# Patient Record
Sex: FEMALE | Race: WHITE | Employment: FULL TIME | URBAN - NONMETROPOLITAN AREA
[De-identification: names, ages, dates, MRNs, and addresses within clinical notes are randomized per-mention and may not be internally consistent; named-entity substitution may affect disease eponyms.]

---
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
## 2021-01-08 ENCOUNTER — OFFICE VISIT (OUTPATIENT)
Dept: SURGERY | Age: 21
End: 2021-01-08

## 2021-01-08 VITALS
HEART RATE: 67 BPM | TEMPERATURE: 97.2 F | DIASTOLIC BLOOD PRESSURE: 79 MMHG | SYSTOLIC BLOOD PRESSURE: 122 MMHG | RESPIRATION RATE: 14 BRPM | HEIGHT: 64 IN | BODY MASS INDEX: 39.27 KG/M2 | WEIGHT: 230 LBS | OXYGEN SATURATION: 98 %

## 2021-01-08 DIAGNOSIS — T81.31XD SURGICAL WOUND DEHISCENCE, SUBSEQUENT ENCOUNTER: ICD-10-CM

## 2021-01-08 DIAGNOSIS — Z98.890 S/P SURGICAL REMOVAL OF PILONIDAL CYST: Primary | ICD-10-CM

## 2021-01-08 PROCEDURE — 99024 POSTOP FOLLOW-UP VISIT: CPT | Performed by: NURSE PRACTITIONER

## 2021-01-08 ASSESSMENT — ENCOUNTER SYMPTOMS
CHEST TIGHTNESS: 0
ABDOMINAL PAIN: 0
DIARRHEA: 0
SINUS PRESSURE: 0
NAUSEA: 0
VOICE CHANGE: 0
VOMITING: 0
ABDOMINAL DISTENTION: 0
SORE THROAT: 0
FACIAL SWELLING: 0
STRIDOR: 0
APNEA: 0
PHOTOPHOBIA: 0
COLOR CHANGE: 0
RECTAL PAIN: 0
ANAL BLEEDING: 0
BLOOD IN STOOL: 0
CHOKING: 0
EYE DISCHARGE: 0
EYE REDNESS: 0
CONSTIPATION: 0
RHINORRHEA: 0
BACK PAIN: 0
SHORTNESS OF BREATH: 0
TROUBLE SWALLOWING: 0
COUGH: 0
EYE ITCHING: 0
WHEEZING: 0
EYE PAIN: 0

## 2021-01-08 NOTE — PROGRESS NOTES
[address] [place] [address] Dr Rangel E [facility] [address]  Dept: [phone]  Dept Fax: [phone]  Loc: [phone]    Visit Date: 1/8/2021    Luz Zaidi is a 24 y.o. female who presents today for:  Chief Complaint   Patient presents with    Post-Op Check     s/p- PILONIDAL CYSTECTOMY-11/24/2020 Last seen in the office on 12/18/2020       HPI:     RYAN Rasmussen is a 19-year old female patient who presents today for follow-up status post pilonidal cystectomy on 11/24/20 with Dr. Yadi Keller. Patient did have some wound dehiscence postoperatively once all sutures were removed. Area is still open area but slowly making improvements. Wound is beefy red and viable. No purulent drainage. Minimal bleeding. There are no signs of infection. Area with minimal pain or discomfort. Tolerating regular diet without any nausea or vomiting.  Denies any issues with constipation or diarrhea. No fevers or chills.  Urinating without difficulties.  Denies any shortness of breath or chest pain. Patient just came from [facility]. States she is 60+ days late and they are working her up for PCOS. She also states she has had pitting edema for the last couple of month. Today are exam she does not have any significant edema, possibly trace to lower extremities. She states it is worse in the evenings. She does sit for 8 hours at school Monday-Friday and does not get up and move much. She does not care for her PCP and would like to know if we have any recommendations within [facility].      Past Medical History:   Diagnosis Date    Anxiety     Multiple allergies     Pilonidal cyst       Past Surgical History:   Procedure Laterality Date    CYST INCISION AND DRAINAGE  10/10/2020    pilonidal cyst-- [facility]    MYRINGOTOMY AND TYMPANOSTOMY TUBE PLACEMENT      20mos old- bilateral    PILONIDAL CYST EXCISION N/A 11/24/2020 Right Ear: Hearing and external ear normal.      Left Ear: Hearing and external ear normal.      Nose: Nose normal.      Mouth/Throat:      Mouth: Mucous membranes are not pale, not dry and not cyanotic. Eyes:      General: Lids are normal.   Neck:      Musculoskeletal: Normal range of motion and neck supple. Trachea: Trachea and phonation normal.   Cardiovascular:      Rate and Rhythm: Normal rate and regular rhythm. Pulses: Normal pulses. Heart sounds: S1 normal and S2 normal.   Pulmonary:      Effort: Pulmonary effort is normal. No tachypnea, bradypnea, accessory muscle usage or respiratory distress. Breath sounds: Normal breath sounds. No decreased breath sounds, wheezing or rales. Chest:      Chest wall: No tenderness. Abdominal:      General: Bowel sounds are normal. There is no distension. Palpations: Abdomen is soft. There is no mass. Tenderness: There is no abdominal tenderness. Musculoskeletal: Normal range of motion. General: No tenderness. Right lower leg: Edema (trace) present. Left lower leg: Edema (trace) present. Skin:     General: Skin is warm and dry. Findings: No abrasion, bruising, burn, ecchymosis, erythema, laceration, lesion or rash. Neurological:      Mental Status: She is alert and oriented to person, place, and time. Motor: No tremor, atrophy or abnormal muscle tone. Coordination: Coordination normal.      Gait: Gait normal.      Deep Tendon Reflexes: Reflexes are normal and symmetric. Psychiatric:         Speech: Speech normal.         Behavior: Behavior normal.         Thought Content:  Thought content normal.        Patient Active Problem List   Diagnosis    Infective otitis externa    Cervical adenopathy    Nasal obstruction    Hypertrophy of nasal turbinates    Deviated nasal septum    Enlarged tonsils and adenoids    Tonsillitis, chronic    Status post tonsillectomy and adenoidectomy  Chronic rhinitis    Streptococcal pharyngitis    Chronic pharyngitis    Acute pharyngitis    Otalgia of right ear    Tension headache    Migraine    GERD (gastroesophageal reflux disease)    Pharyngitis    Sore throat, chronic    Vomiting    Pilonidal cyst     Assessment:     1. Status post pilonidal cystectomy   2. Morbid obesity  3. Irregular periods  4. Lower extremity swelling     Plan:     1. Wound without any signs of infection. Continue wound care as directed. Silver nitrate every other day for the next 2 weeks. 2. Tylenol as needed for discomfort   3. Activity as tolerated  4. Follow up with GYN. Being worked up for PCOS. 5. Follow up in 2 weeks. Signs and symptoms reviewed with patient that would be concerning and need her to return to office for re-evaluation. Patient states she will call if she has questions or concerns. 6. Referral to Dr. Esperanza Zeng for establishing care and concerns for LE edema.  Appointment next Friday.      Electronically signed by LASHAWN Webb CNP on 1/8/2021 at 1:10 PM

## 2021-01-15 ENCOUNTER — OFFICE VISIT (OUTPATIENT)
Dept: FAMILY MEDICINE CLINIC | Age: 21
End: 2021-01-15
Payer: MEDICARE

## 2021-01-15 VITALS
TEMPERATURE: 97.6 F | WEIGHT: 237.2 LBS | HEART RATE: 98 BPM | DIASTOLIC BLOOD PRESSURE: 82 MMHG | RESPIRATION RATE: 17 BRPM | OXYGEN SATURATION: 99 % | HEIGHT: 64 IN | SYSTOLIC BLOOD PRESSURE: 134 MMHG | BODY MASS INDEX: 40.49 KG/M2

## 2021-01-15 DIAGNOSIS — R60.9 DEPENDENT EDEMA: ICD-10-CM

## 2021-01-15 DIAGNOSIS — E28.2 PCOS (POLYCYSTIC OVARIAN SYNDROME): Primary | ICD-10-CM

## 2021-01-15 DIAGNOSIS — E88.81 INSULIN RESISTANCE: ICD-10-CM

## 2021-01-15 DIAGNOSIS — Z76.89 ENCOUNTER TO ESTABLISH CARE WITH NEW DOCTOR: ICD-10-CM

## 2021-01-15 PROCEDURE — G8417 CALC BMI ABV UP PARAM F/U: HCPCS | Performed by: STUDENT IN AN ORGANIZED HEALTH CARE EDUCATION/TRAINING PROGRAM

## 2021-01-15 PROCEDURE — G8427 DOCREV CUR MEDS BY ELIG CLIN: HCPCS | Performed by: STUDENT IN AN ORGANIZED HEALTH CARE EDUCATION/TRAINING PROGRAM

## 2021-01-15 PROCEDURE — 1036F TOBACCO NON-USER: CPT | Performed by: STUDENT IN AN ORGANIZED HEALTH CARE EDUCATION/TRAINING PROGRAM

## 2021-01-15 PROCEDURE — 99213 OFFICE O/P EST LOW 20 MIN: CPT | Performed by: STUDENT IN AN ORGANIZED HEALTH CARE EDUCATION/TRAINING PROGRAM

## 2021-01-15 PROCEDURE — G8482 FLU IMMUNIZE ORDER/ADMIN: HCPCS | Performed by: STUDENT IN AN ORGANIZED HEALTH CARE EDUCATION/TRAINING PROGRAM

## 2021-01-15 RX ORDER — MEDROXYPROGESTERONE ACETATE 10 MG/1
10 TABLET ORAL DAILY
COMMUNITY
End: 2021-01-22 | Stop reason: ALTCHOICE

## 2021-01-15 ASSESSMENT — ENCOUNTER SYMPTOMS
VOMITING: 0
RECTAL PAIN: 1
COLOR CHANGE: 0
COUGH: 0
BACK PAIN: 0
SHORTNESS OF BREATH: 0
NAUSEA: 0

## 2021-01-15 ASSESSMENT — PATIENT HEALTH QUESTIONNAIRE - PHQ9
2. FEELING DOWN, DEPRESSED OR HOPELESS: 0
SUM OF ALL RESPONSES TO PHQ QUESTIONS 1-9: 0
SUM OF ALL RESPONSES TO PHQ9 QUESTIONS 1 & 2: 0
SUM OF ALL RESPONSES TO PHQ QUESTIONS 1-9: 0
1. LITTLE INTEREST OR PLEASURE IN DOING THINGS: 0
SUM OF ALL RESPONSES TO PHQ QUESTIONS 1-9: 0

## 2021-01-15 NOTE — PROGRESS NOTES
S: 24 y.o. 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 O: VS:   Vitals:    01/15/21 0904   BP: 134/82   Site: Right Upper Arm   Position: Sitting   Cuff Size: Large Adult   Pulse: 98   Resp: 17   Temp: 97.6 °F (36.4 °C)   TempSrc: Skin   SpO2: 99%   Weight: 237 lb 3.2 oz (107.6 kg)   Height: 5' 4\" (1.626 m)     Body mass index is 40.72 kg/m². Lab Results   Component Value Date    WBC 10.0 01/13/2020    HGB 13.6 01/13/2020    HCT 41.7 01/13/2020     01/13/2020    AST 22 01/13/2020     01/13/2020    K 4.0 01/13/2020     01/13/2020    CREATININE 0.6 01/13/2020    BUN 8 01/13/2020    CO2 24 01/13/2020    TSH 1.780 01/13/2020    CALCIUM 9.3 01/13/2020       No results found. Diagnosis Orders   1. Encounter to establish care with new doctor     2. PCOS (polycystic ovarian syndrome)     3. Dependent edema     4. Insulin resistance         Plan  Comp socks    Keep legs elevated    Cont metformin    Trying to get pregnant this year - on prenatals    Will see you in        Return in about 3 months (around 4/15/2021) for PCOS.     Orders Placed:

## 2021-01-15 NOTE — PROGRESS NOTES
Health Maintenance Due   Topic Date Due    Hepatitis C screen  2001    HIV screen  04/25/2016    Chlamydia screen  04/25/2017

## 2021-01-15 NOTE — PROGRESS NOTES
[address] [facility] [address] [facility] [place] [address]  Dept: [phone]  Dept Fax: [phone]  Loc: [phone]    Cara Torres is a 24 y.o. female who presents today for:  Chief Complaint   Patient presents with    New Patient    Other     pitting edema bilateral legs after surgery pilonidal cyst removed (11/22/2020)       HPI:   New patient, here to establish care. She was referred to us by General Surgery. She has been following with them for pilonidal cyst surgery. She is an LPN school full-time. Following with Ob/Gyn - possible PCOS, started on Metformin. She has been on Provera x week. Menstrual cycle changed, has not had a period in last 60 days. Denies any pain with intercourse. Would like to have a baby this year. On prenatal vitamins. Weight loss has been difficult for her, especially with her school schedule. Reports she has some edema in her legs - approx a week after surgery. She does sit for 8 hours a day at school never noticed piror to to surgery. She reports some tightness. No erythema. No SOB, cough. No other issues or concerns today. He did have blood work done for OB/GYN last week.   Past Medical History:   Diagnosis Date    Anxiety     Multiple allergies     PCOS (polycystic ovarian syndrome)     Pilonidal cyst       Past Surgical History:   Procedure Laterality Date    CYST INCISION AND DRAINAGE  10/10/2020    pilonidal cyst-- [facility]    MYRINGOTOMY AND TYMPANOSTOMY TUBE PLACEMENT      20mos old- bilateral    PILONIDAL CYST EXCISION N/A 11/24/2020    PILONIDAL CYSTECTOMY performed by Brock Ordonez MD at [address] [place] [address] SEPTOPLASTY  10/23/2017    SEPTOPLASTY N/A 10/23/2017    SEPTOPLASTY, POSSIBLE SUBMUCOUS RESECT TURBINATES PARTIAL LEFT performed by Grace Campbell MD at [facility]  11/20/12    Dr eSrena Royal     Family History Problem Relation Age of Onset    Asthma Mother     Diabetes Maternal Grandfather     Heart Disease Maternal Grandfather     High Blood Pressure Maternal Grandfather     Kidney Disease Maternal Grandfather     Cancer Maternal Grandfather     Arthritis Maternal Grandfather     Stroke Maternal Grandfather     Heart Disease Paternal Grandmother     High Blood Pressure Paternal Grandmother     High Cholesterol Paternal Grandmother     Early Death Paternal Grandmother      Social History     Tobacco Use    Smoking status: Never Smoker    Smokeless tobacco: Never Used   Substance Use Topics    Alcohol use: No      Current Outpatient Medications   Medication Sig Dispense Refill    METFORMIN HCL PO Take by mouth      medroxyPROGESTERone (PROVERA) 10 MG tablet Take 10 mg by mouth daily      ondansetron (ZOFRAN ODT) 4 MG disintegrating tablet Take 1 tablet by mouth every 8 hours as needed for Nausea 20 tablet 0    ibuprofen (ADVIL;MOTRIN) 600 MG tablet Take 1 tablet by mouth 3 times daily as needed for Pain or Fever (Take with food 3 times daily for pain or fever) 20 tablet 0     No current facility-administered medications for this visit. Allergies   Allergen Reactions    Dog Epithelium     Dust Mite Extract     Molds & Smuts     Seasonal     Tree Extract        Health Maintenance   Topic Date Due    Hepatitis C screen  2001    HIV screen  04/25/2016    Chlamydia screen  04/25/2017    DTaP/Tdap/Td vaccine (7 - Td) 08/07/2023    Hepatitis B vaccine  Completed    Hib vaccine  Completed    HPV vaccine  Completed    Varicella vaccine  Completed    Meningococcal (ACWY) vaccine  Completed    Flu vaccine  Completed    Hepatitis A vaccine  Aged Out    Pneumococcal 0-64 years Vaccine  Aged Out       Subjective:      Review of Systems   Constitutional: Negative for fatigue and fever. Respiratory: Negative for cough and shortness of breath. Cardiovascular: Negative for chest pain.

## 2021-01-18 ENCOUNTER — TELEPHONE (OUTPATIENT)
Dept: FAMILY MEDICINE CLINIC | Age: 21
End: 2021-01-18

## 2021-01-18 NOTE — RESULT ENCOUNTER NOTE
Chaparro Jasso,     I reviewed your blood work that the Ob/Gyn office sent over - no concerns for diabetes or thyroid issues at this time! Continue with Metformin for PCOS and we will see you back in a few months.      Dr. Sage Pineda

## 2021-01-18 NOTE — TELEPHONE ENCOUNTER
----- Message from Ricci Baker MD sent at 1/18/2021 11:38 AM EST -----  Sylvie Benitez,     I reviewed your blood work that the Ob/Gyn office sent over - no concerns for diabetes or thyroid issues at this time! Continue with Metformin for PCOS and we will see you back in a few months.      Dr. Polanco Sor

## 2021-01-22 ENCOUNTER — OFFICE VISIT (OUTPATIENT)
Dept: SURGERY | Age: 21
End: 2021-01-22

## 2021-01-22 VITALS
WEIGHT: 234 LBS | DIASTOLIC BLOOD PRESSURE: 74 MMHG | TEMPERATURE: 98.2 F | HEIGHT: 64 IN | RESPIRATION RATE: 14 BRPM | BODY MASS INDEX: 39.95 KG/M2 | OXYGEN SATURATION: 99 % | HEART RATE: 119 BPM | SYSTOLIC BLOOD PRESSURE: 118 MMHG

## 2021-01-22 DIAGNOSIS — Z98.890 S/P SURGICAL REMOVAL OF PILONIDAL CYST: Primary | ICD-10-CM

## 2021-01-22 PROCEDURE — 99024 POSTOP FOLLOW-UP VISIT: CPT | Performed by: NURSE PRACTITIONER

## 2021-01-22 ASSESSMENT — ENCOUNTER SYMPTOMS
ABDOMINAL DISTENTION: 0
NAUSEA: 0
SORE THROAT: 0
DIARRHEA: 0
FACIAL SWELLING: 0
BLOOD IN STOOL: 0
EYE REDNESS: 0
ANAL BLEEDING: 0
APNEA: 0
CHOKING: 0
CONSTIPATION: 0
WHEEZING: 0
COUGH: 0
SINUS PRESSURE: 0
RECTAL PAIN: 0
SHORTNESS OF BREATH: 0
RHINORRHEA: 0
EYE ITCHING: 0
PHOTOPHOBIA: 0
EYE DISCHARGE: 0
VOMITING: 0
TROUBLE SWALLOWING: 0
EYE PAIN: 0
STRIDOR: 0
CHEST TIGHTNESS: 0
ABDOMINAL PAIN: 0
VOICE CHANGE: 0
BACK PAIN: 0
COLOR CHANGE: 0

## 2021-01-22 NOTE — PROGRESS NOTES
[facility] [address] [facility] [address] [facility] [address]  Dept: [phone]  Dept Fax: [phone]  Loc: [phone]    Visit Date: 1/22/2021    Antonette Bates is a 24 y.o. female who presents today for:  Chief Complaint   Patient presents with    Post-Op Check      s/p- PILONIDAL CYSTECTOMY-11/24/2020 Last seen in the office on 1/8/2021-saw Dr Bebe Sofia 1/15/2021       HPI:     RYAN Rasmussen is a 19-year old female patient who presents today for follow-up status post pilonidal cystectomy on 11/24/20 with Dr. Daniel Johnson. Patient did have some wound dehiscence postoperatively once all sutures were removed. Area is still open area but slowly making improvements. Wound is beefy red and viable. No purulent drainage. Minimal bleeding. There are no signs of infection. Area with minimal pain or discomfort. She is using silver nitrate every other day. Tolerating regular diet without any nausea or vomiting.  Denies any issues with constipation or diarrhea. No fevers or chills.  Urinating without difficulties.  Denies any shortness of breath or chest pain. Being treated for PCOS with metformin. Saw her PCP last week for pitting edema. No further workup at this time. Encouraged to wear compression stockings and keep feet elevated in evenings.     Past Medical History:   Diagnosis Date    Anxiety     Multiple allergies     PCOS (polycystic ovarian syndrome)     Pilonidal cyst       Past Surgical History:   Procedure Laterality Date    CYST INCISION AND DRAINAGE  10/10/2020    [facility]    MYRINGOTOMY AND TYMPANOSTOMY TUBE PLACEMENT      20mos old- bilateral    PILONIDAL CYST EXCISION N/A 11/24/2020    PILONIDAL CYSTECTOMY performed by Tawnya Caldwell MD at [address] [place] [address]  10/23/2017    SEPTOPLASTY N/A 10/23/2017 SEPTOPLASTY, POSSIBLE SUBMUCOUS RESECT TURBINATES PARTIAL LEFT performed by Johanne Vidales [facility] at [facility]  11/20/12    Dr Claudette Linn       Family History   Problem Relation Age of Onset    Asthma Mother     Diabetes Maternal Grandfather     Heart Disease Maternal Grandfather     High Blood Pressure Maternal Grandfather     Kidney Disease Maternal Grandfather     Cancer Maternal Grandfather     Arthritis Maternal Grandfather     Stroke Maternal Grandfather     Heart Disease Paternal Grandmother     High Blood Pressure Paternal Grandmother     High Cholesterol Paternal Grandmother     Early Death Paternal Grandmother        Social History     Tobacco Use    Smoking status: Never Smoker    Smokeless tobacco: Never Used   Substance Use Topics    Alcohol use: No      Current Outpatient Medications   Medication Sig Dispense Refill    METFORMIN HCL PO Take 500 mg by mouth       ondansetron (ZOFRAN ODT) 4 MG disintegrating tablet Take 1 tablet by mouth every 8 hours as needed for Nausea 20 tablet 0    ibuprofen (ADVIL;MOTRIN) 600 MG tablet Take 1 tablet by mouth 3 times daily as needed for Pain or Fever (Take with food 3 times daily for pain or fever) 20 tablet 0     No current facility-administered medications for this visit. Allergies   Allergen Reactions    Dog Epithelium     Dust Mite Extract     Molds & Smuts     Seasonal     Tree Extract        Subjective:     Review of Systems   Constitutional: Negative for activity change, appetite change, chills, diaphoresis, fatigue, fever and unexpected weight change. HENT: Negative for congestion, dental problem, drooling, ear discharge, ear pain, facial swelling, hearing loss, mouth sores, nosebleeds, postnasal drip, rhinorrhea, sinus pressure, sneezing, sore throat, tinnitus, trouble swallowing and voice change. Eyes: Negative for photophobia, pain, discharge, redness, itching and visual disturbance. Respiratory: Negative for apnea, cough, choking, chest tightness, shortness of breath, wheezing and stridor. Cardiovascular: Negative for chest pain, palpitations and leg swelling. Gastrointestinal: Negative for abdominal distention, abdominal pain, anal bleeding, blood in stool, constipation, diarrhea, nausea, rectal pain and vomiting. Genitourinary: Negative for decreased urine volume, difficulty urinating, dyspareunia, dysuria, enuresis, flank pain, frequency, genital sores, hematuria, menstrual problem, pelvic pain, urgency, vaginal bleeding, vaginal discharge and vaginal pain. Musculoskeletal: Negative for arthralgias, back pain, gait problem, joint swelling, myalgias, neck pain and neck stiffness. Skin: Positive for wound. Negative for color change, pallor and rash. Neurological: Negative for dizziness, tremors, seizures, syncope, facial asymmetry, speech difficulty, weakness, light-headedness, numbness and headaches. Hematological: Negative for adenopathy. Does not bruise/bleed easily. Psychiatric/Behavioral: Negative for agitation, behavioral problems, confusion, decreased concentration, dysphoric mood, hallucinations, self-injury, sleep disturbance and suicidal ideas. The patient is not nervous/anxious and is not hyperactive. Objective:   /74 (Site: Left Upper Arm, Position: Sitting, Cuff Size: Medium Adult)   Pulse (!) 119   Temp 98.2 °F (36.8 °C) (Temporal)   Resp 14   Ht 5' 4\" (1.626 m)   Wt 234 lb (106.1 kg)   SpO2 99%   BMI 40.17 kg/m²     Physical Exam  Vitals signs reviewed. Constitutional:       General: She is not in acute distress. Appearance: Normal appearance. She is well-developed. She is not ill-appearing or toxic-appearing. HENT:      Head: Normocephalic and atraumatic.       Right Ear: Hearing and external ear normal. Left Ear: Hearing and external ear normal.      Nose: Nose normal.      Mouth/Throat:      Mouth: Mucous membranes are not pale, not dry and not cyanotic. Eyes:      General: Lids are normal.   Neck:      Musculoskeletal: Normal range of motion and neck supple. Trachea: Trachea and phonation normal.   Cardiovascular:      Rate and Rhythm: Normal rate and regular rhythm. Pulses: Normal pulses. Heart sounds: S1 normal and S2 normal.   Pulmonary:      Effort: Pulmonary effort is normal. No tachypnea, bradypnea, accessory muscle usage or respiratory distress. Breath sounds: Normal breath sounds. No decreased breath sounds, wheezing or rales. Chest:      Chest wall: No tenderness. Abdominal:      General: Bowel sounds are normal. There is no distension. Palpations: Abdomen is soft. There is no mass. Tenderness: There is no abdominal tenderness. Musculoskeletal: Normal range of motion. General: No tenderness. Right lower leg: No edema. Left lower leg: No edema. Skin:     General: Skin is warm and dry. Findings: No abrasion, bruising, burn, ecchymosis, erythema, laceration, lesion or rash. Neurological:      Mental Status: She is alert and oriented to person, place, and time. Motor: No tremor, atrophy or abnormal muscle tone. Coordination: Coordination normal.      Gait: Gait normal.      Deep Tendon Reflexes: Reflexes are normal and symmetric. Psychiatric:         Speech: Speech normal.         Behavior: Behavior normal.         Thought Content:  Thought content normal.        Patient Active Problem List   Diagnosis    Infective otitis externa    Cervical adenopathy    Nasal obstruction    Hypertrophy of nasal turbinates    Deviated nasal septum    Enlarged tonsils and adenoids    Tonsillitis, chronic    Status post tonsillectomy and adenoidectomy    Chronic rhinitis    Streptococcal pharyngitis    Chronic pharyngitis  Acute pharyngitis    Otalgia of right ear    Tension headache    Migraine    GERD (gastroesophageal reflux disease)    Pharyngitis    Sore throat, chronic    Vomiting    Pilonidal cyst     Assessment:     1. Status post pilonidal cystectomy   2. Open non-healing wound    Plan:     1. Wound without any signs of infection. Continue wound care as directed. Silver nitrate twice a week. 2. Tylenol as needed for discomfort   3. Activity as tolerated  4. Follow up in 3 weeks. Signs and symptoms reviewed with patient that would be concerning and need her to return to office for re-evaluation. Pebbles Torres states she will call if she has questions or concerns.     Electronically signed by LASHAWN Goldberg CNP on 1/22/2021 at 3:16 PM

## 2021-01-26 ENCOUNTER — VIRTUAL VISIT (OUTPATIENT)
Dept: FAMILY MEDICINE CLINIC | Age: 21
End: 2021-01-26
Payer: MEDICARE

## 2021-01-26 DIAGNOSIS — F32.A ANXIETY AND DEPRESSION: ICD-10-CM

## 2021-01-26 DIAGNOSIS — R50.9 FEVER, UNSPECIFIED FEVER CAUSE: Primary | ICD-10-CM

## 2021-01-26 DIAGNOSIS — F41.9 ANXIETY AND DEPRESSION: ICD-10-CM

## 2021-01-26 PROCEDURE — 99213 OFFICE O/P EST LOW 20 MIN: CPT | Performed by: STUDENT IN AN ORGANIZED HEALTH CARE EDUCATION/TRAINING PROGRAM

## 2021-01-26 PROCEDURE — G8427 DOCREV CUR MEDS BY ELIG CLIN: HCPCS | Performed by: STUDENT IN AN ORGANIZED HEALTH CARE EDUCATION/TRAINING PROGRAM

## 2021-01-26 ASSESSMENT — ENCOUNTER SYMPTOMS
VOMITING: 0
NAUSEA: 0
RHINORRHEA: 0
COUGH: 0
SHORTNESS OF BREATH: 0

## 2021-01-26 NOTE — PROGRESS NOTES
2021    TELEHEALTH EVALUATION -- Audio/Visual (During BXDFP-66 public health emergency)    HPI:    Neftali Jay (:  2000) has requested an audio/video evaluation for the following concern(s):    Patient reports she had a fever yesterday afternoon. States that overnight she felt like she was sweating. She woke up to check her temperature and it was 100.7 F. States she took Tylenol about an hour later and her fever broke. She has not had a fever since yesterday afternoon. She denies any other symptoms at this time. States she feels well overall. School requiring a letter to return to in-person classes tomorrow. Anxiety and depression: Feels most anxiety related to school and she will find it difficult to cope at times. Does not want to be started on medication. Would like to try counseling at this time. Denies any SI/HI. Review of Systems   Constitutional: Negative for appetite change, chills, fatigue and fever. HENT: Negative for congestion and rhinorrhea. Respiratory: Negative for cough and shortness of breath. Cardiovascular: Negative for chest pain. Gastrointestinal: Negative for nausea and vomiting. Musculoskeletal: Negative for arthralgias and myalgias. Neurological: Negative for dizziness and headaches. Prior to Visit Medications    Medication Sig Taking?  Authorizing Provider   METFORMIN HCL PO Take 500 mg by mouth   Historical Provider, MD   ondansetron (ZOFRAN ODT) 4 MG disintegrating tablet Take 1 tablet by mouth every 8 hours as needed for Nausea  Kimberley Reyes PA-C   ibuprofen (ADVIL;MOTRIN) 600 MG tablet Take 1 tablet by mouth 3 times daily as needed for Pain or Fever (Take with food 3 times daily for pain or fever)  Ran Ocasio MD       Social History     Tobacco Use    Smoking status: Never Smoker    Smokeless tobacco: Never Used   Substance Use Topics    Alcohol use: No    Drug use: No        PHYSICAL EXAMINATION: Constitutional: [x] Appears well-developed and well-nourished [] No apparent distress      [] Abnormal-   Mental status  [x] Alert and awake  [] Oriented to person/place/time []Able to follow commands      Eyes:  EOM    [x]  Normal  [] Abnormal-  Sclera  [x]  Normal  [] Abnormal -         Discharge [x]  None visible  [] Abnormal -    HENT:   [x] Normocephalic, atraumatic. [] Abnormal   [x] Mouth/Throat: Mucous membranes are moist.     External Ears [x] Normal  [] Abnormal-     Neck: [x] No visualized mass     Pulmonary/Chest: [x] Respiratory effort normal.  [] No visualized signs of difficulty breathing or respiratory distress        [] Abnormal-      Musculoskeletal:   [] Normal gait with no signs of ataxia         [x] Normal range of motion of neck        [] Abnormal-       Neurological:        [x] No Facial Asymmetry (Cranial nerve 7 motor function) (limited exam to video visit)          [] No gaze palsy        [] Abnormal-         Skin:        [x] No significant exanthematous lesions or discoloration noted on facial skin         [] Abnormal-            Psychiatric:       [x] Normal Affect [] No Hallucinations        [] Abnormal-     Other pertinent observable physical exam findings-     ASSESSMENT/PLAN:  1. Fever, unspecified fever cause  - Patient is no longer febrile. She is not exhibiting any other symptoms concerning for COVID-19 at this time. She is cleared to return to school as long as she remains fever free without medication. Advised to call us if anything changes. 2. Anxiety and depression  - Will refer to Counseling at this time. - [address] [facility] [address], [facility]      Return in about 3 months (around 4/26/2021). Hakeem Chavez is a 24 y.o. female being evaluated by a Virtual Visit (video visit) encounter to address concerns as mentioned above. A caregiver was present when appropriate. Due to this being a TeleHealth encounter (During YYGTX-12 public health emergency), evaluation of the following organ systems was limited: Vitals/Constitutional/EENT/Resp/CV/GI//MS/Neuro/Skin/Heme-Lymph-Imm. Pursuant to the emergency declaration under the [address] [place] [address] and the [employer] Act, this Virtual Visit was conducted with patient's (and/or legal guardian's) consent, to reduce the patient's risk of exposure to COVID-19 and provide necessary medical care. The patient (and/or legal guardian) has also been advised to contact this office for worsening conditions or problems, and seek emergency medical treatment and/or call 911 if deemed necessary. Patient identification was verified at the start of the visit: Yes    Total time spent on this encounter: Not billed by time    Services were provided through a video synchronous discussion virtually to substitute for in-person clinic visit. Patient and provider were located at their individual homes. --Jaswant Núñez MD on 1/26/2021 at 8:50 AM    An electronic signature was used to authenticate this note.

## 2021-01-26 NOTE — PROGRESS NOTES
 Status post tonsillectomy and adenoidectomy    Chronic rhinitis    Streptococcal pharyngitis    Chronic pharyngitis    Acute pharyngitis    Otalgia of right ear    Tension headache    Migraine    GERD (gastroesophageal reflux disease)    Pharyngitis    Sore throat, chronic    Vomiting    Pilonidal cyst       Current Outpatient Medications   Medication Sig Dispense Refill    METFORMIN HCL PO Take 500 mg by mouth       ondansetron (ZOFRAN ODT) 4 MG disintegrating tablet Take 1 tablet by mouth every 8 hours as needed for Nausea 20 tablet 0    ibuprofen (ADVIL;MOTRIN) 600 MG tablet Take 1 tablet by mouth 3 times daily as needed for Pain or Fever (Take with food 3 times daily for pain or fever) 20 tablet 0     No current facility-administered medications for this visit. Review of Systems per Dr. Deborah Brennan    OBJECTIVE     There were no vitals taken for this visit. BP Readings from Last 3 Encounters:   01/22/21 118/74   01/15/21 134/82   01/08/21 122/79       Wt Readings from Last 3 Encounters:   01/22/21 234 lb (106.1 kg) (>99 %, Z= 2.36)*   01/15/21 237 lb 3.2 oz (107.6 kg) (>99 %, Z= 2.39)*   01/08/21 230 lb (104.3 kg) (99 %, Z= 2.32)*     * Growth percentiles are based on CDC (Girls, 2-20 Years) data. There is no height or weight on file to calculate BMI.     Physical Exam per Dr. Deborah Brennan      Immunization History   Administered Date(s) Administered    DTaP vaccine 2001, 2001, 2001, 08/16/2002, 07/17/2006    HPV 9-valent Brookea Mur[place]) 08/07/2013, 08/23/2018    Hepatitis B Ped/Adol (Engerix-B, Recombivax HB) 2001, 2001, 2001, 08/16/2002, 07/17/2006    Hib (HbOC) 2001, 2001, 2001, 06/14/2002    Influenza A (Y0A1-24) Vaccine Nasal 12/15/2009    Influenza, MDCK Quadv, IM, PF (Flucelvax 4 yrs and older) 10/30/2020    MMR 06/14/2002, 07/17/2006    Meningococcal B, OMV (Bexsero) 08/23/2018    Meningococcal MCV4O (Menveo) 08/23/2018  Meningococcal MCV4P (Menactra) 08/07/2013    Polio IPV (IPOL) 2001, 2001, 08/16/2002, 07/17/2006    Tdap (Boostrix, Adacel) 08/07/2013    Varicella (Varivax) 09/18/2020, 12/23/2020       Health Maintenance   Topic Date Due    Hepatitis C screen  2001    HIV screen  04/25/2016    Chlamydia screen  04/25/2017    DTaP/Tdap/Td vaccine (7 - Td) 08/07/2023    Hepatitis B vaccine  Completed    Hib vaccine  Completed    HPV vaccine  Completed    Varicella vaccine  Completed    Meningococcal (ACWY) vaccine  Completed    Flu vaccine  Completed    Hepatitis A vaccine  Aged Out    Pneumococcal 0-64 years Vaccine  Aged Out       Future Appointments   Date Time Provider Xiao Johnson   2/12/2021  9:00 AM Joel Liang, APRN - [address] [place] [address]   4/16/2021 10:00 AM Leann Human, DO [facility] - [place]       ASSESSMENT       Diagnosis Orders   1. Fever, unspecified fever cause     2. Anxiety and depression  [facility] [place], [place] [address], [facility], [facility]      After discussion with , we agreed on plan as follows: Will hold on COVID testing at this time as Tmax 100.7 and pt otherwise asymptomatic. Refer to Erlinda Man for anxiety counseling. Continue current meds otherwise. Follow up as scheduled 4/16/2021        Attending Physician Statement  I have discussed the case, including pertinent history and exam findings with the resident. I agree with the documented assessment and plan as documented by the resident.   GE modifier added  to this encounter     Electronically signed by Trace Gonzales MD on 1/26/2021 at 9:46 AM

## 2021-01-26 NOTE — LETTER
[address] [address],[address] [facility] [address]  Phone: [phone]  Fax: [phone]    Jovita Costa MD        January 26, 2021     Patient: Marta Hunt   YOB: 2000   Date of Visit: 1/26/2021       Dear Ms. Janessa Beltre:    Faith Espinoza was seen in my clinic on 1/26/2021. She may return to school on 1/27/2021. She is not exhibiting symptoms concerning for COVID-19 or any other contagious infection. She will be fever free for 24 hours without the use of medication. .    If you have any questions or concerns, please don't hesitate to call.     Sincerely,       Jovita Costa MD

## 2021-02-12 ENCOUNTER — OFFICE VISIT (OUTPATIENT)
Dept: SURGERY | Age: 21
End: 2021-02-12

## 2021-02-12 VITALS
WEIGHT: 232 LBS | HEART RATE: 88 BPM | OXYGEN SATURATION: 98 % | BODY MASS INDEX: 39.61 KG/M2 | TEMPERATURE: 97.2 F | DIASTOLIC BLOOD PRESSURE: 74 MMHG | HEIGHT: 64 IN | RESPIRATION RATE: 14 BRPM | SYSTOLIC BLOOD PRESSURE: 122 MMHG

## 2021-02-12 DIAGNOSIS — Z98.890 S/P SURGICAL REMOVAL OF PILONIDAL CYST: Primary | ICD-10-CM

## 2021-02-12 DIAGNOSIS — T81.31XD SURGICAL WOUND DEHISCENCE, SUBSEQUENT ENCOUNTER: ICD-10-CM

## 2021-02-12 PROCEDURE — 99024 POSTOP FOLLOW-UP VISIT: CPT | Performed by: NURSE PRACTITIONER

## 2021-02-12 ASSESSMENT — ENCOUNTER SYMPTOMS
COUGH: 0
SINUS PRESSURE: 0
TROUBLE SWALLOWING: 0
ABDOMINAL DISTENTION: 0
BLOOD IN STOOL: 0
CHOKING: 0
CHEST TIGHTNESS: 0
COLOR CHANGE: 0
DIARRHEA: 0
VOMITING: 0
NAUSEA: 0
APNEA: 0
WHEEZING: 0
FACIAL SWELLING: 0
EYE PAIN: 0
BACK PAIN: 0
SORE THROAT: 0
CONSTIPATION: 0
EYE REDNESS: 0
RECTAL PAIN: 0
EYE DISCHARGE: 0
PHOTOPHOBIA: 0
VOICE CHANGE: 0
ANAL BLEEDING: 0
STRIDOR: 0
SHORTNESS OF BREATH: 0
RHINORRHEA: 0
EYE ITCHING: 0
ABDOMINAL PAIN: 0

## 2021-02-12 NOTE — PROGRESS NOTES
[address] [facility] [address]  Dept: [phone]  Dept Fax: [phone]  Loc: [phone]    Visit Date: 2/12/2021    Neftali Jay is a 24 y.o. female who presents today for:  Chief Complaint   Patient presents with    Post-Op Check     s/p- PILONIDAL CYSTECTOMY-11/24/2020 Last seen in the office on 1/22/2021       HPI:     RYAN Rasmussen is a 19-year old female patient who presents today for follow-up status post pilonidal cystectomy on 11/24/20 with Dr. Coleman Man healing progress. She has been silver nitrating the area 2-3 times a week as tolerated. States she thought the area was closed for a few days and the last week it opened back up. Drainage is bloody but small amount. Wound is beefy red and viable. No purulent drainage. There are no signs of infection. Area without pain or discomfort. Tolerating regular diet without any nausea or vomiting.  Denies any issues with constipation or diarrhea. No fevers or chills.  Urinating without difficulties.  Denies any shortness of breath or chest pain. 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      Past Medical History:   Diagnosis Date    Anxiety     Multiple allergies     PCOS (polycystic ovarian syndrome)     Pilonidal cyst       Past Surgical History:   Procedure Laterality Date    CYST INCISION AND DRAINAGE  10/10/2020    pilonidal cyst-- H    MYRINGOTOMY AND TYMPANOSTOMY TUBE PLACEMENT      20mos old- bilateral    PILONIDAL CYST EXCISION N/A 11/24/2020    PILONIDAL CYSTECTOMY performed by Orlando Daigle MD at [facility] SEPTOPLASTY  10/23/2017    SEPTOPLASTY N/A 10/23/2017    SEPTOPLASTY, POSSIBLE SUBMUCOUS RESECT TURBINATES PARTIAL LEFT performed by Francois Brower MD at [facility]  11/20/12    Dr Estrellita Cedeño       Family History Problem Relation Age of Onset    Asthma Mother     Diabetes Maternal Grandfather     Heart Disease Maternal Grandfather     High Blood Pressure Maternal Grandfather     Kidney Disease Maternal Grandfather     Cancer Maternal Grandfather     Arthritis Maternal Grandfather     Stroke Maternal Grandfather     Heart Disease Paternal Grandmother     High Blood Pressure Paternal Grandmother     High Cholesterol Paternal Grandmother     Early Death Paternal Grandmother        Social History     Tobacco Use    Smoking status: Never Smoker    Smokeless tobacco: Never Used   Substance Use Topics    Alcohol use: No      Current Outpatient Medications   Medication Sig Dispense Refill    METFORMIN HCL PO Take 500 mg by mouth       ondansetron (ZOFRAN ODT) 4 MG disintegrating tablet Take 1 tablet by mouth every 8 hours as needed for Nausea 20 tablet 0    ibuprofen (ADVIL;MOTRIN) 600 MG tablet Take 1 tablet by mouth 3 times daily as needed for Pain or Fever (Take with food 3 times daily for pain or fever) 20 tablet 0     No current facility-administered medications for this visit. Allergies   Allergen Reactions    Dog Epithelium     Dust Mite Extract     Molds & Smuts     Seasonal     Tree Extract        Subjective:     Review of Systems   Constitutional: Negative for activity change, appetite change, chills, diaphoresis, fatigue, fever and unexpected weight change. HENT: Negative for congestion, dental problem, drooling, ear discharge, ear pain, facial swelling, hearing loss, mouth sores, nosebleeds, postnasal drip, rhinorrhea, sinus pressure, sneezing, sore throat, tinnitus, trouble swallowing and voice change. Eyes: Negative for photophobia, pain, discharge, redness, itching and visual disturbance. Respiratory: Negative for apnea, cough, choking, chest tightness, shortness of breath, wheezing and stridor. Cardiovascular: Negative for chest pain, palpitations and leg swelling. Gastrointestinal: Negative for abdominal distention, abdominal pain, anal bleeding, blood in stool, constipation, diarrhea, nausea, rectal pain and vomiting. Genitourinary: Negative for decreased urine volume, difficulty urinating, dyspareunia, dysuria, enuresis, flank pain, frequency, genital sores, hematuria, menstrual problem, pelvic pain, urgency, vaginal bleeding, vaginal discharge and vaginal pain. Musculoskeletal: Negative for arthralgias, back pain, gait problem, joint swelling, myalgias, neck pain and neck stiffness. Skin: Positive for wound. Negative for color change, pallor and rash. Neurological: Negative for dizziness, tremors, seizures, syncope, facial asymmetry, speech difficulty, weakness, light-headedness, numbness and headaches. Hematological: Negative for adenopathy. Does not bruise/bleed easily. Psychiatric/Behavioral: Negative for agitation, behavioral problems, confusion, decreased concentration, dysphoric mood, hallucinations, self-injury, sleep disturbance and suicidal ideas. The patient is not nervous/anxious and is not hyperactive. Objective:   /74 (Site: Left Upper Arm, Position: Sitting, Cuff Size: Medium Adult)   Pulse 88   Temp 97.2 °F (36.2 °C) (Temporal)   Resp 14   Ht 5' 4\" (1.626 m)   Wt 232 lb (105.2 kg)   SpO2 98%   BMI 39.82 kg/m²     Physical Exam  Vitals signs reviewed. Constitutional:       General: She is not in acute distress. Appearance: Normal appearance. She is well-developed. She is not ill-appearing or toxic-appearing. HENT:      Head: Normocephalic and atraumatic. Right Ear: Hearing and external ear normal.      Left Ear: Hearing and external ear normal.      Nose: Nose normal.      Mouth/Throat:      Mouth: Mucous membranes are not pale, not dry and not cyanotic. Eyes:      General: Lids are normal.   Neck:      Musculoskeletal: Normal range of motion and neck supple.       Trachea: Trachea and phonation normal. Cardiovascular:      Rate and Rhythm: Normal rate and regular rhythm. Pulses: Normal pulses. Heart sounds: S1 normal and S2 normal.   Pulmonary:      Effort: Pulmonary effort is normal. No tachypnea, bradypnea, accessory muscle usage or respiratory distress. Breath sounds: Normal breath sounds. No decreased breath sounds, wheezing or rales. Chest:      Chest wall: No tenderness. Abdominal:      General: Bowel sounds are normal. There is no distension. Palpations: Abdomen is soft. There is no mass. Tenderness: There is no abdominal tenderness. Musculoskeletal: Normal range of motion. General: No tenderness. Right lower leg: No edema. Left lower leg: No edema. Skin:     General: Skin is warm and dry. Findings: No abrasion, bruising, burn, ecchymosis, erythema, laceration, lesion or rash. Neurological:      Mental Status: She is alert and oriented to person, place, and time. Motor: No tremor, atrophy or abnormal muscle tone. Coordination: Coordination normal.      Gait: Gait normal.      Deep Tendon Reflexes: Reflexes are normal and symmetric. Psychiatric:         Speech: Speech normal.         Behavior: Behavior normal.         Thought Content: Thought content normal.            Patient Active Problem List   Diagnosis    Infective otitis externa    Cervical adenopathy    Nasal obstruction    Hypertrophy of nasal turbinates    Deviated nasal septum    Enlarged tonsils and adenoids    Tonsillitis, chronic    Status post tonsillectomy and adenoidectomy    Chronic rhinitis    Streptococcal pharyngitis    Chronic pharyngitis    Acute pharyngitis    Otalgia of right ear    Tension headache    Migraine    GERD (gastroesophageal reflux disease)    Pharyngitis    Sore throat, chronic    Vomiting    Pilonidal cyst     Assessment:     1. Status post pilonidal cystectomy   2.  Open non-healing wound    Plan: 1. Wound without any signs of infection. Continue wound care as directed. Silver nitrate 2-3 times a week. 2. Tylenol as needed for discomfort   3. Activity as tolerated  4. Follow up in 3 weeks with Dr. Vladimir Ly. Signs and symptoms reviewed with patient that would be concerning and need her to return to office for re-evaluation. Nicko  states she will call if she has questions or concerns.     Electronically signed by ALSHAWN Jimenez CNP on 2/13/2021 at 1:08 PM

## 2021-02-26 ENCOUNTER — OFFICE VISIT (OUTPATIENT)
Dept: BEHAVIORAL/MENTAL HEALTH CLINIC | Age: 21
End: 2021-02-26
Payer: MEDICARE

## 2021-02-26 DIAGNOSIS — F33.2 SEVERE EPISODE OF RECURRENT MAJOR DEPRESSIVE DISORDER, WITHOUT PSYCHOTIC FEATURES (HCC): ICD-10-CM

## 2021-02-26 DIAGNOSIS — F41.1 GENERALIZED ANXIETY DISORDER: Primary | ICD-10-CM

## 2021-02-26 PROCEDURE — 1036F TOBACCO NON-USER: CPT | Performed by: SOCIAL WORKER

## 2021-02-26 PROCEDURE — 90791 PSYCH DIAGNOSTIC EVALUATION: CPT | Performed by: SOCIAL WORKER

## 2021-02-26 ASSESSMENT — ANXIETY QUESTIONNAIRES
1. FEELING NERVOUS, ANXIOUS, OR ON EDGE: 3-NEARLY EVERY DAY
4. TROUBLE RELAXING: 3-NEARLY EVERY DAY
GAD7 TOTAL SCORE: 20
3. WORRYING TOO MUCH ABOUT DIFFERENT THINGS: 3-NEARLY EVERY DAY
5. BEING SO RESTLESS THAT IT IS HARD TO SIT STILL: 3-NEARLY EVERY DAY
6. BECOMING EASILY ANNOYED OR IRRITABLE: 2-OVER HALF THE DAYS

## 2021-02-26 ASSESSMENT — PATIENT HEALTH QUESTIONNAIRE - PHQ9
3. TROUBLE FALLING OR STAYING ASLEEP: 2
5. POOR APPETITE OR OVEREATING: 3
9. THOUGHTS THAT YOU WOULD BE BETTER OFF DEAD, OR OF HURTING YOURSELF: 0
SUM OF ALL RESPONSES TO PHQ9 QUESTIONS 1 & 2: 3
6. FEELING BAD ABOUT YOURSELF - OR THAT YOU ARE A FAILURE OR HAVE LET YOURSELF OR YOUR FAMILY DOWN: 3
1. LITTLE INTEREST OR PLEASURE IN DOING THINGS: 2
SUM OF ALL RESPONSES TO PHQ QUESTIONS 1-9: 20
SUM OF ALL RESPONSES TO PHQ QUESTIONS 1-9: 20
8. MOVING OR SPEAKING SO SLOWLY THAT OTHER PEOPLE COULD HAVE NOTICED. OR THE OPPOSITE, BEING SO FIGETY OR RESTLESS THAT YOU HAVE BEEN MOVING AROUND A LOT MORE THAN USUAL: 3

## 2021-02-26 NOTE — PROGRESS NOTES
Behavioral Health Consultation  VANIA Rdz  2/26/2021  11:05 AM EST  This note will not be viewable in Gather Appt for the following reason(s). This is a Psychotherapy Note. Time spent with Patient:40 minutes  This is patient's first  [facility] appointment. Reason for Consult:    Chief Complaint   Patient presents with    Anxiety    Depression     Referring Provider: Sean Springer MD  [address]  [place],  [address]    Pt provided informed consent for the behavioral health program. Discussed with patient model of service to include the limits of confidentiality (i.e. abuse reporting, suicide intervention, etc.) and short-term intervention focused approach. Pt indicated understanding. Feedback given to PCP. S:  Pt identified the presenting problem as anxiety and depression and indicated this as her  primary needs to address through behavioral health services. The history of the problem was explored with pt in establishing her father's alcohol dependency and current anxieties about her relationships have significantly impacted her symptoms. The current situation was processed with pt in examining thoughts, feelings, and impairment on daily functioning. Pt indicated symptoms of poor sleep, difficulties with concentration, depressed mood,  feeling anxious/edgy, and ruminating on negative/worrisome thoughts. Pt was guided in exploring areas of behavioral change, development of effective coping strategies, and assessing the management of environmental factors influencing the problem. The PHQ-9 was administered and pt scored 20, indicating  Severe major depression. The ANGELICA 7 scored at 20 indicating Generalized anxiety to be in the severe range. Pt denied thoughts that she would be better off dead, or of hurting herself in some way. Pt was advised of indications of depressive symptoms and instructed to monitor if symptoms worsen or interfere with daily functioning, to consider following-up with either Provided handout on  depression and anxiety, Trained in relaxation strategies and Discussed self-care (sleep, nutrition, rewarding activities, social support, exercise)    Pt Behavioral Change Plan:   1. 1. Pt will read handouts regarding depression, anxiety,  relaxation techniques, and self care. 2. Pt will practice self calming skills 2-3x's daily for 3-5 minutes. 3. Pt will promote effective self care habits daily/weekly-rest, relaxation, stress management, supportive relationships, increased physical outlets, engagement in enjoyable activities.   4. Pt will follow up with VANIA Roldan

## 2021-02-26 NOTE — PATIENT INSTRUCTIONS
1. Depression: Facts, Myths & Tips for Feeling Better    Facts    Depression is very common  Nearly 20% of the [place] population experiences a significant depression during their lifetime. Depression is treatable  Because depression affects so many, and can have such a powerful and negative impact on life, there has been an enormous amount of research conducted on how to reduce symptoms and improve functioning. As a result, we now know there are behaviors YOU can engage in to make yourself feel better. Depression is not a weakness  People suffer from depression for a variety of reasons, biological, environmental and behavioral.  Research indicates that [employer] is NOT one of the reasons people become depressed. Depression is not something you are powerless against  Evidence suggests that you can directly impact the intensity and duration of depression by what you do and by altering the way you think about certain things. The Downward Spiral    Depression often begins as a drop in mood due to an environmental or biological trigger that makes people feel less like being active. Being less active, in turn, often causes people to experience an even lower mood and feel even less like being active, and so the cycle begins. How can I start feeling better? The first and best way to reverse the downward cycle is to get active! Your body produces its own anti-depressants every time you exercise or do something pleasurable. Regular exercise is one of the very best ways to improve your mood. In fact some studies have shown that a solid exercise program is as effective as psychotherapy or anti-depressant medication for some people. *See physical activity section of handout    Force yourself to do something you found pleasurable before depression. This may be different for everyone and it doesnt matter if its gardening, playing bridge, walking, reading a novel, or simply talking to a close friend. What matters is that YOU find the activity pleasurable! Even if you dont feel like doing something pleasurable for yourself, DO IT ANYWAY. We call this the fake it until you make it principle. Educate yourself! Often people feel powerless against medical conditions because they do not understand what is happening in their body. Just by reading this handout you know more than most people about depression. Knowledge is power when you can apply it, and make yourself feel better. *See recommended reading list at the bottom of this handout\"    Begin to notice unhealthy and unhelpful thoughts! In addition to how we behave, how we think influences our mood directly. Notice recurrent or alarming thoughts that have an impact on your mood. Ask yourself is this type of thinking helping me or hurting me?  if your answer is it's hurting me here are some things you can do: *see disputation of negative thoughts section of handout  - Challenge the negative thought. Is it truly accurate? Wheres the proof? Become your own  and collect the facts.  - Reframe the negative thought. How can I think differently about this problem, situation, or view of myself? Allow yourself to view a situation from more than one angle, how might my spouse, friend, or someone I admire view this same problem?  - Use the best friend scenario. How would you help your best friend if he or she was having these same thoughts? Would you criticize him or her as harshly as you criticize yourself? Remember, YOU know YOU better than anyone else. You likely know what kinds of activities, thoughts and reinforcement you respond to. Doing whats easiest and most doable is the key. Pick 1 or 2 things that are easy and get started feeling better TODAY!     * Use the following handout sections to guide you through behavioral and thinking exercises to help you manage your depressive symptoms, improve your functioning and to begin living your life well again. Recommended readings on managing depression    - Feeling Good by Dr. Maynor Bailon. Hernandez     - Mind over Allison[place]Matthias by Holly Baker and [facility] by Dr. Graciela Jimenez by Dr. Rashard Vela. Sapolsky      Disputation:  Challenging Upsetting Thinking    Examine your thoughts for key words:  1. must, need, got to, have to, should (unrealistic standards)  2. never, always, completely, totally, all everything, everyone (predictions /  labeling)  3. awful, terrible, horrible, unbearable, disaster, worst ever (labeling / predictions)  4. jerk, slob, creep, hypocrite, bully, stupid (labels)  Dispute or question the accuracy of the questionable thoughts. 1. Am I upsetting myself unnecessarily? How can I see this another way? 2. Is my thinking working for or against me? How could I view this in a less upsetting way? 3. What am I demanding must happen? What do I want or prefer, rather than need? 4. Am I making something too terrible? Is it really that awful? What would be so terrible about that? 5. Am I labeling a person? What is the action that I dont like? 6. Whats untrue about my thoughts? How can I stick to the facts? Whats the proof for what I am thinking or believing about this? 7. Am I using extreme, black-and-white language? What less extreme words might be more accurate? 8. Am I fortune telling or mind reading in a way that gets me upset or unhappy? What are the odds (percent chance -- e.g., there is a 5% chance. ..) that it will really turn out the way Im thinking or imagining? 9. What are my options in this situation? How would I like to respond? 10. Create more moderate, helpful, or realistic statements to replace the upsetting ones. 11. Have I had any experiences that show that this thought might not be totally true? 12. If my best friend or someone I loved had this thought, what would I tell them?   13. If my best friend or someone I loved knew I was thinking this thought, what would they say to me? What evidence would they point out to me that would suggest that my thought is not completely true? 14. Are there strengths in me or positives in the situation that I am ignoring? Am I underestimating my ability to cope with unfortunate circumstances? 15. When I am not feeling this way, do I think about this situation any differently? How?  16. Have I been in this type of situation before? What happened? What have I learned from prior experiences that could help me now? 17. Five years from now, if I look back on this situation, will I look at it any differently? Will I focus on any different part of my experience? 25. Am I blaming myself for something over which I do not have complete control? 2. The Stress Response and How It Can Affect You   The stress response, or fight or flight response is the emergency reaction system of the body. It is there to keep you safe in emergencies. The stress response includes physical and thought responses to your perception of various situations. When the stress response is turned on, your body may release substances like adrenaline and cortisol. Your organs are programmed to respond in certain ways to situations that are viewed as challenging or threatening. The stress response can work against you. You can turn it on when you dont really need it and, as a result, perceive something as an emergency when its really not. It can turn on when you are just thinking about past or future events. Harmless, chronic conditions can be intensified by the stress response activating too often, with too much intensity, or for too long. Stress responses can be different for different individuals. Below is a list of some common stress related responses people have.  (Cable the responses you have had in the last 2 weeks.) Physical Responses   Muscle aches   Insomnia   ? Heart rate   Headache   Weight gain   Nausea   Constipation   Dry mouth   Muscle twitching  Weight loss   Low energy   Weakness   Tight chest   Diarrhea   Dizziness   Trembling   Stomach cramps  Chills    Hot flashes   Sweating   Pounding heart  Choking feeling  Chest pain   Leg cramps   Numb hands/feet Dry throat   Appetite change  Face flushing   ? Blood pressure  Light-headedness  Feeling faint        Trouble swallowing   Rash ? Urination   Neck pain             Tingling hands/feet                                                                                             Emotional and Thought Responses   Restlessness   Agitation   Insecurity             Worthlessness   Anxiety   Stress    Depression             Hopelessness   Guilt    Defensiveness  Anger            Racing thoughts   Nightmares   Intense thinking  Sensitivity            Expecting the worst   Numbness   Lack of motivation  Mood swings             Forgetfulness   ? Concentration  Rigidity              Preoccupation  Intolerance                                                Behavioral Responses   Avoidance   Withdrawal   Neglect   ? Alcohol use    Smoking   ? Eating   Arguing        Poor appearance   ? Spending   Poor hygiene   ? Eating   Seeking reassurance   Nail biting   Skin picking   ? Talking         ? Body checking   Sexual problems  Foot tapping   Fidgeting Rapid walking    ? Exercise   Teeth clenching           Multitasking   Aggressive speaking       ? Fun activities  ? Sleeping      ? Relaxing activities     Seeking information     The parasympathetic nervous system in your body is designed to turn on your bodys relaxation response. Your behaviors and thinking can keep your bodys natural relaxation response from operating at its best.     Getting your body to relax on a daily basis for at least brief periods can help decrease unpleasant stress responses. Learning to relax your body, through specific breathing and relaxation exercises as well as by minimizing stressful thinking, can help your bodys natural relaxation system be more effective. 3.   Deep Breathing Exercises    [employer]  Exercise 1:  The Stimulating Breath (also called the Ronnie Breath)   Its aim is to raise energy level and increase alertness. - Inhale and exhale rapidly through your nose, keeping your mouth closed but relaxed. Your breaths in and out should be equal in duration, but as short as possible. This is a noisy breathing exercise. - Try for three in-and-out breath cycles per second. This produces a quick movement of the diaphragm, suggesting a ronnie. Breathe normally after each cycle. - Do not do for more than 15 seconds on your first try. Each time you practice the Stimulating Breath, you can increase your time by five seconds or so, until you reach a full minute. If done properly, you may feel invigorated, comparable to the heightened awareness you feel after a good workout. You should feel the effort at the back of the neck, the diaphragm, the chest and the abdomen. Try this breathing exercise the next time you need an energy boost and feel yourself reaching for a cup of coffee. Exercise 2:  The 4-7-8 (or Relaxing Breath) Exercise  This exercise is utterly simple, takes almost no time, requires no equipment and can be done anywhere. Although you can do the exercise in any position, sit with your back straight while learning the exercise. Place the tip of your tongue against the ridge of tissue just behind your upper front teeth, and keep it there through the entire exercise. You will be exhaling through your mouth around your tongue; try pursing your lips slightly if this seems awkward.  Exhale completely through your mouth, making a whoosh sound.  Close your mouth and inhale quietly through your nose to a mental count of four.    Hold your breath for a count of seven.  Exhale completely through your mouth, making a whoosh sound to a count of eight.  This is one breath. Now inhale again and repeat the cycle three more times for a total of four breaths. Note that you always inhale quietly through your nose and exhale audibly through your mouth. The tip of your tongue stays in position the whole time. Exhalation takes twice as long as inhalation. The absolute time you spend on each phase is not important; the ratio of 4:7:8 is important. If you have trouble holding your breath, speed the exercise up but keep to the ratio of 4:7:8 for the three phases. With practice you can slow it all down and get used to inhaling and exhaling more and more deeply. This exercise is a natural tranquilizer for the nervous system. Unlike tranquilizing drugs, which are often effective when you first take them but then lose their power over time, this exercise is subtle when you first try it but gains in power with repetition and practice. Do it at least twice a day. You cannot do it too frequently. Do NOT do more than 4 breaths at one time for the first month of practice. Later, if you wish, you can extend it to eight breaths. If you feel a little lightheaded when you first breathe this way, do not be concerned; it will pass. Once you develop this technique by practicing it every day, it will be a very useful tool that you will always have with you. Use it whenever anything upsetting happens - before you react. Use it whenever you are aware of internal tension. Use it to help you fall asleep. This exercise cannot be recommended too highly. Everyone can benefit from it. Exercise 3: Meditation exercise  Breath Counting  If you want to get a feel for this challenging work, try your hand at breath counting, a deceptively simple technique. Sit in a comfortable position with the spine straight and head inclined slightly forward.  Gently close your eyes and take a few deep breaths. Then let the breath come naturally without trying to influence it. Ideally it will be quiet and slow, but depth and rhythm may vary.  To begin the exercise, count \"one\" to yourself as you exhale.  The next time you exhale, count \"two,\" and so on up to [place]. \"   Then begin a new cycle, counting \"one\" on the next exhalation. Never count higher than \"five,\" and count only when you exhale. You will know your attention has wandered when you find yourself up to \"eight,\" \"12,\" even \"19. \"  Try to do 10 minutes of this form of meditation. 4. Pt was advised of indications of depressive symptoms and instructed to monitor if symptoms worsen or interfere with daily functioning, to consider following-up with either your primary care team or a behavioral health provider for possible counseling or medication management. If suicidal thoughts are experienced, call 911. An additional 24/7 resource is the [facility] at 5-520-732-GBTU ([phone]). 5. Pt will prioritize effective self care daily/weekly- sleep hygiene, increased physical outlet, establishing boundaries, relaxation, enjoyable activities.   6. Pt will follow up with VANIA Fuentes

## 2021-03-12 ENCOUNTER — OFFICE VISIT (OUTPATIENT)
Dept: BEHAVIORAL/MENTAL HEALTH CLINIC | Age: 21
End: 2021-03-12
Payer: MEDICARE

## 2021-03-12 DIAGNOSIS — F33.2 SEVERE EPISODE OF RECURRENT MAJOR DEPRESSIVE DISORDER, WITHOUT PSYCHOTIC FEATURES (HCC): ICD-10-CM

## 2021-03-12 DIAGNOSIS — F41.1 GENERALIZED ANXIETY DISORDER: Primary | ICD-10-CM

## 2021-03-12 PROCEDURE — 1036F TOBACCO NON-USER: CPT | Performed by: SOCIAL WORKER

## 2021-03-12 PROCEDURE — 90832 PSYTX W PT 30 MINUTES: CPT | Performed by: SOCIAL WORKER

## 2021-03-12 NOTE — PATIENT INSTRUCTIONS
1.   Constructive Worry Worksheet  Concerns Solutions     1. ________________________                        2.  ______________________                        3.  _______________________     1. __________________________        2. __________________________        3. __________________________        1.  _________________________        2. __________________________        3. __________________________        1. __________________________        2. __________________________        3.  __________________________           Constructive Worry Instructions  When we have challenges, we tend to use our problem-solving skills to make our lives better and to relieve ourselves of anxiety. It is not surprising that some of us may use our problem-solving skills at the wrong time and place, namely bedtime. We may think about a problem, trying to solve it, but unfortunately this will oftentimes keep us awake. Constructive worry is a method for managing the tendency to worry during that quiet time when sleep is supposed to be taking over. Do this exercise early in the evening (at least 2 hours before bed). It should take only about 15 minutes to complete. Here is how it is done:  1. Write down the problem(s) facing you that has the greatest chance of keeping you awake a bedtime, and list them in the Concerns column of the [address]. 2. Then, think of the next step that might help fix it. Write it down in the Solutions column. This need not be the final solution to the problem, since most problems have to be solved by taking steps anyhow, and you will be doing this again tomorrow night and the night after until you finally get to the best solution.  If you know how to fix the problem completely, then write that down.  If you decide that this is not really a big problem, and you will just deal with it when the time comes, then write that down.    If you decide that you simply do not know what to do about it, and need to ask someone to help you, write that down.  If you decide that it is a problem, but there seems to be no good solution at all, and that you will just have to live with it, write that down, with a note to yourself that maybe sometime soon you or someone you speak with will give you a clue that will lead you to a solution. 3. Repeat this for any other concerns you have. 4. Fold the Constructive Worry Worksheet in half and place it on the nightstand next to your bed and forget about it until bedtime. 5. At bedtime, if you begin to worry actually tell yourself that you have dealt with your problems already in the best way you know how, and when you were at your problem-solving best.  Remind yourself that you will be working on them again tomorrow evening and that nothing you can do while you are so tired can help you any more than what you have already done; more effort will only make the matters worst.    Deep Breathing Exercises    [employer]  Exercise 1:  The Stimulating Breath (also called the Ronnie Breath)   Its aim is to raise energy level and increase alertness. - Inhale and exhale rapidly through your nose, keeping your mouth closed but relaxed. Your breaths in and out should be equal in duration, but as short as possible. This is a noisy breathing exercise. - Try for three in-and-out breath cycles per second. This produces a quick movement of the diaphragm, suggesting a ronnie. Breathe normally after each cycle. - Do not do for more than 15 seconds on your first try. Each time you practice the Stimulating Breath, you can increase your time by five seconds or so, until you reach a full minute. If done properly, you may feel invigorated, comparable to the heightened awareness you feel after a good workout. You should feel the effort at the back of the neck, the diaphragm, the chest and the abdomen.  Try this breathing exercise the next time you need an energy boost and feel yourself reaching for a cup of coffee. Exercise 2:  The 4-7-8 (or Relaxing Breath) Exercise  This exercise is utterly simple, takes almost no time, requires no equipment and can be done anywhere. Although you can do the exercise in any position, sit with your back straight while learning the exercise. Place the tip of your tongue against the ridge of tissue just behind your upper front teeth, and keep it there through the entire exercise. You will be exhaling through your mouth around your tongue; try pursing your lips slightly if this seems awkward.  Exhale completely through your mouth, making a whoosh sound.  Close your mouth and inhale quietly through your nose to a mental count of four.  Hold your breath for a count of seven.  Exhale completely through your mouth, making a whoosh sound to a count of eight.  This is one breath. Now inhale again and repeat the cycle three more times for a total of four breaths. Note that you always inhale quietly through your nose and exhale audibly through your mouth. The tip of your tongue stays in position the whole time. Exhalation takes twice as long as inhalation. The absolute time you spend on each phase is not important; the ratio of 4:7:8 is important. If you have trouble holding your breath, speed the exercise up but keep to the ratio of 4:7:8 for the three phases. With practice you can slow it all down and get used to inhaling and exhaling more and more deeply. This exercise is a natural tranquilizer for the nervous system. Unlike tranquilizing drugs, which are often effective when you first take them but then lose their power over time, this exercise is subtle when you first try it but gains in power with repetition and practice. Do it at least twice a day. You cannot do it too frequently. Do NOT do more than 4 breaths at one time for the first month of practice. Later, if you wish, you can extend it to eight breaths.  If you feel a little lightheaded when you first breathe this way, do not be concerned; it will pass. Once you develop this technique by practicing it every day, it will be a very useful tool that you will always have with you. Use it whenever anything upsetting happens - before you react. Use it whenever you are aware of internal tension. Use it to help you fall asleep. This exercise cannot be recommended too highly. Everyone can benefit from it. Exercise 3: Meditation exercise  Breath Counting  If you want to get a feel for this challenging work, try your hand at breath counting, a deceptively simple technique. Sit in a comfortable position with the spine straight and head inclined slightly forward. Gently close your eyes and take a few deep breaths. Then let the breath come naturally without trying to influence it. Ideally it will be quiet and slow, but depth and rhythm may vary.  To begin the exercise, count \"one\" to yourself as you exhale.  The next time you exhale, count \"two,\" and so on up to [place]. \"   Then begin a new cycle, counting \"one\" on the next exhalation. Never count higher than \"five,\" and count only when you exhale. You will know your attention has wandered when you find yourself up to \"eight,\" \"12,\" even \"19. \"  Try to do 10 minutes of this form of meditation. 2. Pt was advised of indications of depressive symptoms and instructed to monitor if symptoms worsen or interfere with daily functioning, to consider following-up with either your primary care team or a behavioral health provider for possible counseling or medication management. If suicidal thoughts are experienced, call 911. An additional 24/7 resource is the [facility] at 3-528-715-OWUP ([phone]). 3. Pt will prioritize effective self care daily/weekly- sleep hygiene, increased physical outlet, establishing boundaries, relaxation, enjoyable activities.   4. Pt will follow up with VANIA Dennis

## 2021-03-12 NOTE — PROGRESS NOTES
Mood    euthymic   Affect    normal affect  Thought Content    intact  Thought Process    coherent  Associations    logical connections  Insight    Fair  Judgment    Intact  Orientation    oriented to person, place, time, and general circumstances  Memory    recent and remote memory intact  Attention/Concentration    intact  Morbid ideation No  Suicide Assessment    no suicidal ideation    History:    TOBACCO:   reports that she has never smoked. She has never used smokeless tobacco.  ETOH:   reports no history of alcohol use. Family History:   Family History   Problem Relation Age of Onset    Asthma Mother     Diabetes Maternal Grandfather     Heart Disease Maternal Grandfather     High Blood Pressure Maternal Grandfather     Kidney Disease Maternal Grandfather     Cancer Maternal Grandfather     Arthritis Maternal Grandfather     Stroke Maternal Grandfather     Heart Disease Paternal Grandmother     High Blood Pressure Paternal Grandmother     High Cholesterol Paternal Grandmother     Early Death Paternal Grandmother        A:      Diagnosis:    1. Generalized anxiety disorder    2. Severe episode of recurrent major depressive disorder, without psychotic features ([facility] [address]          Diagnosis Date    Anxiety     Multiple allergies     PCOS (polycystic ovarian syndrome)     Pilonidal cyst        Plan:Pt will attend a follow up appointment in two weeks to assess symptoms and evaluate effectiveness of coping skills. Pt interventions:Reviewed handout regarding Constructive worry    Pt Behavioral Change Plan:   1. Pt will read handouts regarding constructive worry. 2. Pt will practice self calming skills 2-3x's daily for 3-5 minutes. 3. Pt will promote effective self care habits daily/weekly-rest, relaxation, stress management, supportive relationships, increased physical outlets, engagement in enjoyable activities.   4. Pt will follow up with VANIA Hutson

## 2021-03-17 ENCOUNTER — OFFICE VISIT (OUTPATIENT)
Dept: SURGERY | Age: 21
End: 2021-03-17

## 2021-03-17 VITALS
RESPIRATION RATE: 18 BRPM | TEMPERATURE: 97.5 F | BODY MASS INDEX: 40.12 KG/M2 | DIASTOLIC BLOOD PRESSURE: 78 MMHG | HEART RATE: 98 BPM | HEIGHT: 64 IN | OXYGEN SATURATION: 98 % | WEIGHT: 235 LBS | SYSTOLIC BLOOD PRESSURE: 116 MMHG

## 2021-03-17 DIAGNOSIS — Z98.890 S/P SURGICAL REMOVAL OF PILONIDAL CYST: Primary | ICD-10-CM

## 2021-03-17 PROCEDURE — 99024 POSTOP FOLLOW-UP VISIT: CPT | Performed by: SURGERY

## 2021-03-18 ASSESSMENT — ENCOUNTER SYMPTOMS
CONSTIPATION: 0
SINUS PRESSURE: 0
SORE THROAT: 0
VOICE CHANGE: 0
EYE PAIN: 0
CHOKING: 0
COLOR CHANGE: 0
SHORTNESS OF BREATH: 0
WHEEZING: 0
APNEA: 0
EYE DISCHARGE: 0
PHOTOPHOBIA: 0
ANAL BLEEDING: 0
ALLERGIC/IMMUNOLOGIC NEGATIVE: 1
COUGH: 0
FACIAL SWELLING: 0
RECTAL PAIN: 0
CHEST TIGHTNESS: 0
RHINORRHEA: 0
EYE ITCHING: 0
BLOOD IN STOOL: 0
ABDOMINAL PAIN: 0
EYE REDNESS: 0
ABDOMINAL DISTENTION: 0
TROUBLE SWALLOWING: 0
STRIDOR: 0
DIARRHEA: 0
VOMITING: 0
NAUSEA: 0
BACK PAIN: 0

## 2021-03-18 NOTE — PROGRESS NOTES
Ash Alicea (:  2000)     ASSESSMENT:  1.  Status post pilonidal cystectomy (2020)  2. Delayed wound healing    PLAN:  1. Incision is now completely healed. Originally the middle section was open and slow to heal but it is now closed in its entirety. No drainage. No signs of infection. Nontender. No signs of recurrent disease. 2.  No restrictions from a surgical standpoint  3. Follow-up as needed  4. Signs and symptoms reviewed with patient that would be concerning and need her to return to office for re-evaluation. Patient states She will call if She has questions or concerns. SUBJECTIVE/OBJECTIVE:    Chief Complaint   Patient presents with    Post-Op Check     s/p- PILONIDAL CYSTECTOMY-2020 Last seen in the office on 2021     RYAN Coulter is a 28-year-old female who is status post pilonidal cystectomy in 2020. She was slow to heal in the middle of the incision. Required silver nitrate and further wound care. She states recently over the last week it has healed over. No drainage. No bleeding. No signs of infection. Normal bowel function. Tolerating regular diet. Back to her normal activities. No complaints. Review of Systems   Constitutional: Negative for activity change, appetite change, chills, diaphoresis, fatigue, fever and unexpected weight change. HENT: Negative for congestion, dental problem, drooling, ear discharge, ear pain, facial swelling, hearing loss, mouth sores, nosebleeds, postnasal drip, rhinorrhea, sinus pressure, sneezing, sore throat, tinnitus, trouble swallowing and voice change. Eyes: Negative for photophobia, pain, discharge, redness, itching and visual disturbance. Respiratory: Negative for apnea, cough, choking, chest tightness, shortness of breath, wheezing and stridor. Cardiovascular: Negative for chest pain, palpitations and leg swelling.    Gastrointestinal: Negative for abdominal distention, abdominal 20 tablet 1    METFORMIN HCL PO Take 1,000 mg by mouth 2 times daily       ibuprofen (ADVIL;MOTRIN) 600 MG tablet Take 1 tablet by mouth 3 times daily as needed for Pain or Fever (Take with food 3 times daily for pain or fever) 20 tablet 0     No current facility-administered medications for this visit.         Allergies   Allergen Reactions    Dog Epithelium     Dust Mite Extract     Molds & Smuts     Seasonal     Tree Extract        Family History   Problem Relation Age of Onset    Asthma Mother     Diabetes Maternal Grandfather     Heart Disease Maternal Grandfather     High Blood Pressure Maternal Grandfather     Kidney Disease Maternal Grandfather     Cancer Maternal Grandfather     Arthritis Maternal Grandfather     Stroke Maternal Grandfather     Heart Disease Paternal Grandmother     High Blood Pressure Paternal Grandmother     High Cholesterol Paternal Grandmother     Early Death Paternal Grandmother        Social History     Socioeconomic History    Marital status: Single     Spouse name: Not on file    Number of children: Not on file    Years of education: Not on file    Highest education level: Not on file   Occupational History    Not on file   Social Needs    Financial resource strain: Not on file    Food insecurity     Worry: Not on file     Inability: Not on file    Transportation needs     Medical: Not on file     Non-medical: Not on file   Tobacco Use    Smoking status: Never Smoker    Smokeless tobacco: Never Used   Substance and Sexual Activity    Alcohol use: No    Drug use: No    Sexual activity: Never   Lifestyle    Physical activity     Days per week: Not on file     Minutes per session: Not on file    Stress: Not on file   Relationships    Social connections     Talks on phone: Not on file     Gets together: Not on file     Attends Christianity service: Not on file     Active member of club or organization: Not on file     Attends meetings of clubs or organizations: Not on file     Relationship status: Not on file    Intimate partner violence     Fear of current or ex partner: Not on file     Emotionally abused: Not on file     Physically abused: Not on file     Forced sexual activity: Not on file   Other Topics Concern    Not on file   Social History Narrative    Not on file     Vitals:    03/17/21 1107   BP: 116/78   Site: Left Upper Arm   Position: Sitting   Cuff Size: Medium Adult   Pulse: 98   Resp: 18   Temp: 97.5 °F (36.4 °C)   TempSrc: Temporal   SpO2: 98%   Weight: 235 lb (106.6 kg)   Height: 5' 4\" (1.626 m)     Body mass index is 40.34 kg/m². Wt Readings from Last 3 Encounters:   03/17/21 235 lb (106.6 kg) (>99 %, Z= 2.38)*   02/12/21 232 lb (105.2 kg) (>99 %, Z= 2.34)*   01/22/21 234 lb (106.1 kg) (>99 %, Z= 2.36)*     * Growth percentiles are based on CDC (Girls, 2-20 Years) data. Physical Exam  Vitals signs reviewed. Constitutional:       General: She is not in acute distress. Appearance: She is well-developed. She is not diaphoretic. HENT:      Head: Normocephalic and atraumatic. Right Ear: External ear normal.      Left Ear: External ear normal.      Nose: Nose normal.   Eyes:      General: No scleral icterus. Right eye: No discharge. Left eye: No discharge. Conjunctiva/sclera: Conjunctivae normal.   Neck:      Musculoskeletal: Normal range of motion and neck supple. Cardiovascular:      Rate and Rhythm: Normal rate and regular rhythm. Heart sounds: Normal heart sounds. Pulmonary:      Effort: Pulmonary effort is normal. No respiratory distress. Breath sounds: Normal breath sounds. No wheezing or rales. Chest:      Chest wall: No tenderness. Abdominal:      General: Bowel sounds are normal. There is no distension. Palpations: Abdomen is soft. There is no mass. Tenderness: There is no abdominal tenderness. There is no guarding or rebound.    Musculoskeletal: Normal range of motion. General: No tenderness. Skin:     General: Skin is warm and dry. Coloration: Skin is not pale. Findings: No erythema or rash. Neurological:      Mental Status: She is alert and oriented to person, place, and time. Cranial Nerves: No cranial nerve deficit. Psychiatric:         Behavior: Behavior normal.         Thought Content: Thought content normal.         Judgment: Judgment normal.       Lab Results   Component Value Date     01/13/2020    K 4.0 01/13/2020     01/13/2020    CO2 24 01/13/2020     Lab Results   Component Value Date    CREATININE 0.6 01/13/2020     Lab Results   Component Value Date    WBC 10.0 01/13/2020    HGB 13.6 01/13/2020    HCT 41.7 01/13/2020    MCV 94.1 01/13/2020     01/13/2020     Imaging - none    Patient Active Problem List   Diagnosis    Infective otitis externa    Cervical adenopathy    Nasal obstruction    Hypertrophy of nasal turbinates    Deviated nasal septum    Enlarged tonsils and adenoids    Tonsillitis, chronic    Status post tonsillectomy and adenoidectomy    Chronic rhinitis    Streptococcal pharyngitis    Chronic pharyngitis    Acute pharyngitis    Otalgia of right ear    Tension headache    Migraine    GERD (gastroesophageal reflux disease)    Pharyngitis    Sore throat, chronic    Vomiting    Pilonidal cyst     An electronic signature was used to authenticate this note.     --Walker Bialey MD

## 2021-04-01 ENCOUNTER — PATIENT MESSAGE (OUTPATIENT)
Dept: FAMILY MEDICINE CLINIC | Age: 21
End: 2021-04-01

## 2021-04-01 NOTE — TELEPHONE ENCOUNTER
From: Yung Pardo  To:  Cesra Austin MD  Sent: 4/1/2021 6:23 AM EDT  Subject: Prescription Question    Can you send in a refill for my metformin

## 2021-04-09 ENCOUNTER — OFFICE VISIT (OUTPATIENT)
Dept: BEHAVIORAL/MENTAL HEALTH CLINIC | Age: 21
End: 2021-04-09
Payer: MEDICARE

## 2021-04-09 DIAGNOSIS — F33.2 SEVERE EPISODE OF RECURRENT MAJOR DEPRESSIVE DISORDER, WITHOUT PSYCHOTIC FEATURES (HCC): ICD-10-CM

## 2021-04-09 DIAGNOSIS — F41.1 GENERALIZED ANXIETY DISORDER: Primary | ICD-10-CM

## 2021-04-09 PROCEDURE — 1036F TOBACCO NON-USER: CPT | Performed by: SOCIAL WORKER

## 2021-04-09 PROCEDURE — 90834 PSYTX W PT 45 MINUTES: CPT | Performed by: SOCIAL WORKER

## 2021-04-09 NOTE — PATIENT INSTRUCTIONS
1.  Deep Breathing Exercises    BIJAN VIRTUAL HOPE BOX  Exercise 1:  The Stimulating Breath (also called the Ronnie Breath)   Its aim is to raise energy level and increase alertness. - Inhale and exhale rapidly through your nose, keeping your mouth closed but relaxed. Your breaths in and out should be equal in duration, but as short as possible. This is a noisy breathing exercise. - Try for three in-and-out breath cycles per second. This produces a quick movement of the diaphragm, suggesting a ronnie. Breathe normally after each cycle. - Do not do for more than 15 seconds on your first try. Each time you practice the Stimulating Breath, you can increase your time by five seconds or so, until you reach a full minute. If done properly, you may feel invigorated, comparable to the heightened awareness you feel after a good workout. You should feel the effort at the back of the neck, the diaphragm, the chest and the abdomen. Try this breathing exercise the next time you need an energy boost and feel yourself reaching for a cup of coffee. Exercise 2:  The 4-7-8 (or Relaxing Breath) Exercise  This exercise is utterly simple, takes almost no time, requires no equipment and can be done anywhere. Although you can do the exercise in any position, sit with your back straight while learning the exercise. Place the tip of your tongue against the ridge of tissue just behind your upper front teeth, and keep it there through the entire exercise. You will be exhaling through your mouth around your tongue; try pursing your lips slightly if this seems awkward.  Exhale completely through your mouth, making a whoosh sound.  Close your mouth and inhale quietly through your nose to a mental count of four.  Hold your breath for a count of seven.  Exhale completely through your mouth, making a whoosh sound to a count of eight.  This is one breath.  Now inhale again and repeat the cycle three more times for a total of four exhale.  The next time you exhale, count \"two,\" and so on up to Santana. \"   Then begin a new cycle, counting \"one\" on the next exhalation. Never count higher than \"five,\" and count only when you exhale. You will know your attention has wandered when you find yourself up to \"eight,\" \"12,\" even \"19. \"  Try to do 10 minutes of this form of meditation. 2. Pt was advised of indications of depressive symptoms and instructed to monitor if symptoms worsen or interfere with daily functioning, to consider following-up with either your primary care team or a behavioral health provider for possible counseling or medication management. If suicidal thoughts are experienced, call 911. An additional 24/7 resource is the [facility] at 9-674-658-VCNP ([phone]). 3. Pt will prioritize effective self care daily/weekly- sleep hygiene, increased physical outlet, establishing boundaries, relaxation, enjoyable activities.   4. Pt will follow up with VANIA Abdul

## 2021-05-07 DIAGNOSIS — E28.2 PCOS (POLYCYSTIC OVARIAN SYNDROME): Primary | ICD-10-CM

## 2021-05-07 NOTE — TELEPHONE ENCOUNTER
Patient's last appointment was : 1/26/2021  Patient's next appointment is : Visit date not found  Last refilled:  4/2/21

## 2021-05-07 NOTE — LETTER
[address] [place] [address],[address] [facility] [address]  Phone: [phone]  Fax: [address], [place]        May 12, 2021    [address] [place]      Dear Ludmila Crews: We have made several attempts to contact you by phone and have   been unsuccessful. Please call our office at your earliest convenience  At [phone] opt [phone]. Thank you.       Sincerely,        Nadege Issa MD

## 2021-08-11 ENCOUNTER — VIRTUAL VISIT (OUTPATIENT)
Dept: FAMILY MEDICINE CLINIC | Age: 21
End: 2021-08-11

## 2021-08-11 DIAGNOSIS — R09.81 NASAL CONGESTION: Primary | ICD-10-CM

## 2021-08-11 PROCEDURE — 99213 OFFICE O/P EST LOW 20 MIN: CPT | Performed by: STUDENT IN AN ORGANIZED HEALTH CARE EDUCATION/TRAINING PROGRAM

## 2021-08-11 ASSESSMENT — ENCOUNTER SYMPTOMS
VOMITING: 0
CONSTIPATION: 0
NAUSEA: 0
TROUBLE SWALLOWING: 0
BLOOD IN STOOL: 0
ABDOMINAL PAIN: 0
SHORTNESS OF BREATH: 0
EYE PAIN: 0
DIARRHEA: 0
COUGH: 0

## 2021-08-11 NOTE — PROGRESS NOTES
Neeru Chang is a 22 y.o. female being evaluated by a Virtual Visit (video visit) encounter to address concerns as mentioned above. A caregiver was present when appropriate. Due to this being a TeleHealth encounter (During EWXID-07 public health emergency), evaluation of the following organ systems was limited: Vitals/Constitutional/EENT/Resp/CV/GI//MS/Neuro/Skin/Heme-Lymph-Imm. Pursuant to the emergency declaration under the [address] [place] [address] and the [employer] Act, this Virtual Visit was conducted with patient's (and/or legal guardian's) consent, to reduce the patient's risk of exposure to COVID-19 and provide necessary medical care. The patient (and/or legal guardian) has also been advised to contact this office for worsening conditions or problems, and seek emergency medical treatment and/or call 911 if deemed necessary. Services were provided through a video synchronous discussion virtually to substitute for in-person clinic visit. Patient and provider were located at their individual homes. --Melquiades Mehta MD on 8/11/2021 at 10:39 PM    An electronic signature was used to authenticate this note. Jerry Marcos is a 22 y. o.female    Chief Complaint   Patient presents with    Congestion       Chief complaint, [place], and all pertinent details of the case reviewed with the resident. Please see resident's note for specific details discussed at today's visit.     Patient Active Problem List   Diagnosis    Infective otitis externa    Cervical adenopathy    Nasal obstruction    Hypertrophy of nasal turbinates    Deviated nasal septum    Enlarged tonsils and adenoids    Tonsillitis, chronic    Status post tonsillectomy and adenoidectomy    Chronic rhinitis    Streptococcal pharyngitis    Chronic pharyngitis    Acute pharyngitis    Otalgia of right ear  Tension headache    Migraine    GERD (gastroesophageal reflux disease)    Pharyngitis    Sore throat, chronic    Vomiting    Pilonidal cyst       Current Outpatient Medications   Medication Sig Dispense Refill    metFORMIN (GLUCOPHAGE) 1000 MG tablet Take 1 tablet by mouth 2 times daily (with meals) 180 tablet 0    ondansetron (ZOFRAN ODT) 4 MG disintegrating tablet Take 1 tablet by mouth every 8 hours as needed for Nausea 20 tablet 1    ibuprofen (ADVIL;MOTRIN) 600 MG tablet Take 1 tablet by mouth 3 times daily as needed for Pain or Fever (Take with food 3 times daily for pain or fever) 20 tablet 0     No current facility-administered medications for this visit. Review of Systems per Dr. Macho Katz     No flowsheet data found.      Physical Exam per Dr. Mynor Ibrahim    Immunization History   Administered Date(s) Administered    DTaP vaccine 2001, 2001, 2001, 08/16/2002, 07/17/2006    HPV 9-valent Deloris Pereira) 08/07/2013, 08/23/2018    Hepatitis B Ped/Adol (Engerix-B, Recombivax HB) 2001, 2001, 2001, 08/16/2002, 07/17/2006    Hib (HbOC) 2001, 2001, 2001, 06/14/2002    Influenza A (D1Q6-44) Vaccine Nasal 12/15/2009    Influenza, MDCK Quadv, IM, PF (Flucelvax 4 yrs and older) 10/30/2020    MMR 06/14/2002, 07/17/2006    Meningococcal B, OMV (Bexsero) 08/23/2018    Meningococcal MCV4O (Menveo) 08/23/2018    Meningococcal MCV4P (Menactra) 08/07/2013    Polio IPV (IPOL) 2001, 2001, 08/16/2002, 07/17/2006    Tdap (Boostrix, Adacel) 08/07/2013    Varicella (Varivax) 09/18/2020, 12/23/2020       Health Maintenance   Topic Date Due    Hepatitis C screen  Never done    COVID-19 Vaccine (1) Never done    HIV screen  Never done    Chlamydia screen  Never done    Flu vaccine (1) 09/01/2021    DTaP/Tdap/Td vaccine (7 - Td or Tdap) 08/07/2023    Hepatitis B vaccine  Completed    Hib vaccine  Completed    HPV vaccine Completed    Varicella vaccine  Completed    Meningococcal (ACWY) vaccine  Completed    Hepatitis A vaccine  Aged Out    Pneumococcal 0-64 years Vaccine  Aged Out           No future appointments. ASSESSMENT       Diagnosis Orders   1. Nasal congestion         PLAN      After discussion with Dr. Jane Rodgers, we agreed on plan as follows:    Okay to return to work as patient has no significant ill contacts, no known Covid contacts, and no symptoms consistent with Covid at this time. Encouraged over-the-counter supportive care only  Follow-up if no better. Attending Physician Statement  I have discussed the case, including pertinent history and exam findings with the resident. I agree with the documented assessment and plan as documented by the resident.   GE modifier added  to this encounter     Electronically signed by Konstantin Juan MD on 8/11/2021 at 10:39 PM

## 2021-08-11 NOTE — PROGRESS NOTES
2021    TELEHEALTH EVALUATION -- Audio/Visual (During RXTBZ-15 public health emergency)    HPI:    Debra Macario (:  2000) has requested an audio/video evaluation for the following concern(s):    URI  HPI:      Symptoms have been present for 3 day(s). Symptoms are better since they initially started. Fever? No  Runny nose or congestion? Yes   Cough? No  Sore throat? No  Headache, fatigue, joint pains, muscle aches? No  Shortness of breath/Wheezing? No  Nausea/Vomiting/Diarrhea? No  Double Sickening? No  Sick contacts? No    Patient has tried a humidifier with good improvement. No know COVID contacts. Works as LPN. Needs work note to return to work    Review of Systems   Constitutional: Negative for chills, fatigue and fever. HENT: Positive for congestion. Negative for ear pain, postnasal drip and trouble swallowing. Eyes: Negative for pain and visual disturbance. Respiratory: Negative for cough and shortness of breath. Cardiovascular: Negative for chest pain and palpitations. Gastrointestinal: Negative for abdominal pain, blood in stool, constipation, diarrhea, nausea and vomiting. Genitourinary: Negative for dysuria and urgency. Skin: Negative for rash and wound. Neurological: Negative for dizziness and headaches. Psychiatric/Behavioral: Negative for dysphoric mood. The patient is not nervous/anxious. Prior to Visit Medications    Medication Sig Taking?  Authorizing Provider   metFORMIN (GLUCOPHAGE) 1000 MG tablet Take 1 tablet by mouth 2 times daily (with meals)  Yasmani Del Cid MD   ondansetron (ZOFRAN ODT) 4 MG disintegrating tablet Take 1 tablet by mouth every 8 hours as needed for Nausea  Yasmani Del Cid MD   ibuprofen (ADVIL;MOTRIN) 600 MG tablet Take 1 tablet by mouth 3 times daily as needed for Pain or Fever (Take with food 3 times daily for pain or fever)  Leopoldo Avers, MD       Social History     Tobacco Use    Smoking status: Never Smoker    Smokeless tobacco: Never Used   Vaping Use    Vaping Use: Never used   Substance Use Topics    Alcohol use: No    Drug use: No            PHYSICAL EXAMINATION:      Constitutional: [x] Appears well-developed and well-nourished [x] No apparent distress      [] Abnormal-   Mental status  [x] Alert and awake  [x] Oriented to person/place/time [x]Able to follow commands      Eyes:  EOM    [x]  Normal  [] Abnormal-  Sclera  [x]  Normal  [] Abnormal -         Discharge [x]  None visible  [] Abnormal -    HENT:   [x] Normocephalic, atraumatic. [] Abnormal   [x] Mouth/Throat: Mucous membranes are moist.     External Ears [x] Normal  [] Abnormal-     Neck: [x] No visualized mass     Pulmonary/Chest: [x] Respiratory effort normal.  [x] No visualized signs of difficulty breathing or respiratory distress        [] Abnormal-      Musculoskeletal:   [x] Normal gait with no signs of ataxia         [x] Normal range of motion of neck        [] Abnormal-       Neurological:        [x] No Facial Asymmetry (Cranial nerve 7 motor function) (limited exam to video visit)          [x] No gaze palsy        [] Abnormal-         Skin:        [x] No significant exanthematous lesions or discoloration noted on facial skin         [] Abnormal-            Psychiatric:       [x] Normal Affect [x] No Hallucinations        [] Abnormal-         ASSESSMENT/PLAN:  1. Nasal congestion  Return to work note written. No testing necessary as patient only has congestion and no sick contacts. Follow up as needed or if symptoms worsenen. Return if symptoms worsen or fail to improve. Erica Luna, was evaluated through a synchronous (real-time) audio-video encounter. The patient (or guardian if applicable) is aware that this is a billable service. Verbal consent to proceed has been obtained within the past 12 months.  The visit was conducted pursuant to the emergency declaration under the [facility] [address] waiver authority and the [employer] Act. Patient identification was verified, and a caregiver was present when appropriate. The patient was located in a state where the provider was credentialed to provide care. Total time spent on this encounter: Not billed by time    --Anselmo Dale DO on 8/11/2021 at 4:56 PM    An electronic signature was used to authenticate this note.

## 2021-08-11 NOTE — LETTER
[address] [address],[address] [facility] [address]  Phone: [phone]  Fax: Raoul Metzger, DO        August 11, 2021     Patient: Mandeep Portillo   YOB: 2000   Date of Visit: 8/11/2021       To Whom it May Concern:    Jens Jones was seen in my clinic on 8/11/2021. She may return to work on 8/12/21. If you have any questions or concerns, please don't hesitate to call.     Sincerely,         Shanita Mcguire, DO

## 2021-09-02 ENCOUNTER — HOSPITAL ENCOUNTER (OUTPATIENT)
Age: 21
Discharge: HOME | End: 2021-09-02

## 2021-11-24 ENCOUNTER — APPOINTMENT (OUTPATIENT)
Dept: GENERAL RADIOLOGY | Age: 21
End: 2021-11-24
Payer: COMMERCIAL

## 2021-11-24 ENCOUNTER — HOSPITAL ENCOUNTER (EMERGENCY)
Age: 21
Discharge: HOME | End: 2021-11-24
Payer: COMMERCIAL

## 2021-11-24 VITALS
DIASTOLIC BLOOD PRESSURE: 86 MMHG | HEART RATE: 86 BPM | SYSTOLIC BLOOD PRESSURE: 134 MMHG | TEMPERATURE: 97.5 F | OXYGEN SATURATION: 97 % | RESPIRATION RATE: 16 BRPM

## 2021-11-24 DIAGNOSIS — S83.92XS SPRAIN OF LEFT KNEE, UNSPECIFIED LIGAMENT, SEQUELA: Primary | ICD-10-CM

## 2021-11-24 PROCEDURE — 99213 OFFICE O/P EST LOW 20 MIN: CPT

## 2021-11-24 PROCEDURE — 99213 OFFICE O/P EST LOW 20 MIN: CPT | Performed by: EMERGENCY MEDICINE

## 2021-11-24 PROCEDURE — 73564 X-RAY EXAM KNEE 4 OR MORE: CPT

## 2021-11-24 RX ORDER — NAPROXEN 500 MG/1
500 TABLET ORAL 2 TIMES DAILY WITH MEALS
Qty: 60 TABLET | Refills: 0 | Status: SHIPPED | OUTPATIENT
Start: 2021-11-24 | End: 2021-12-16 | Stop reason: ALTCHOICE

## 2021-11-24 RX ORDER — METFORMIN HYDROCHLORIDE 500 MG/1
500 TABLET, FILM COATED, EXTENDED RELEASE ORAL
COMMUNITY
End: 2021-12-16

## 2021-11-24 ASSESSMENT — PAIN DESCRIPTION - ORIENTATION: ORIENTATION: LEFT

## 2021-11-24 ASSESSMENT — PAIN SCALES - GENERAL: PAINLEVEL_OUTOF10: 7

## 2021-11-24 ASSESSMENT — ENCOUNTER SYMPTOMS
SHORTNESS OF BREATH: 0
COLOR CHANGE: 0
COUGH: 0

## 2021-11-24 ASSESSMENT — PAIN DESCRIPTION - LOCATION: LOCATION: KNEE

## 2021-11-24 NOTE — ED TRIAGE NOTES
Got up out of a chair 2 hours ago and twisted left knee wrong, has had problems with knee off and on since volleyball in highschool (2015)

## 2021-11-24 NOTE — ED PROVIDER NOTES
[facility]       CHIEF COMPLAINT       Chief Complaint   Patient presents with    Knee Problem       Nurses Notes reviewed and I agree except as noted in the HPI. HISTORY OF PRESENT ILLNESS   Josefa Hensley is a 22 y.o. female who presents for complaints of left knee pain. Patient states that she played volleyball in high school, 3 years ago. She states she had a knee injury. She had x-rays but never had any further studies performed. Since then she has been having intermittent left knee pain. Today she was at a sitting position and when she stood up she twisted her knee. She states she has medial knee pain since then. Patient rates her pain a 7 on a 10 scale and is here for evaluation. Patient did not fall. There is no swelling. No redness. HPI    REVIEW OF SYSTEMS     Review of Systems   Constitutional: Negative for fatigue and fever. Respiratory: Negative for cough and shortness of breath. Musculoskeletal: Positive for arthralgias (left knee). Skin: Negative for color change. Neurological: Negative for dizziness, light-headedness and headaches. Psychiatric/Behavioral: Negative for behavioral problems. PAST MEDICAL HISTORY         Diagnosis Date    Anxiety     Multiple allergies     PCOS (polycystic ovarian syndrome)     Pilonidal cyst        SURGICALHISTORY     Patient  has a past surgical history that includes Myringotomy Tympanostomy Tube Placement; Tonsillectomy and adenoidectomy (11/20/12); Septoplasty (10/23/2017); Septoplasty (N/A, 10/23/2017); cyst incision and drainage (10/10/2020); and Pilonidal cyst excision (N/A, 11/24/2020).     CURRENT MEDICATIONS       Discharge Medication List as of 11/24/2021  5:40 PM      CONTINUE these medications which have NOT CHANGED    Details   metFORMIN, MOD, (GLUMETZA) 500 MG extended release tablet Take 500 mg by mouth daily (with breakfast)Historical Med      ondansetron (ZOFRAN ODT) 4 MG disintegrating tablet Take 1 tablet by mouth every 8 hours as needed for Nausea, Disp-20 tablet, R-1Normal             ALLERGIES     Patient is is allergic to dog epithelium, dust mite extract, molds & smuts, seasonal, and tree extract. Patients   Immunization History   Administered Date(s) Administered    DTaP vaccine 2001, 2001, 2001, 08/16/2002, 07/17/2006    HPV 9-valent Bonnie Plaster) 08/07/2013, 08/23/2018    Hepatitis B Ped/Adol (Engerix-B, Recombivax HB) 2001, 2001, 2001, 08/16/2002, 07/17/2006    Hib (HbOC) 2001, 2001, 2001, 06/14/2002    Influenza A (U7K5-06) Vaccine Nasal 12/15/2009    Influenza, MDCK Quadv, IM, PF (Flucelvax 2 yrs and older) 10/30/2020    MMR 06/14/2002, 07/17/2006    Meningococcal B, OMV (Bexsero) 08/23/2018    Meningococcal MCV4O (Menveo) 08/23/2018    Meningococcal MCV4P (Menactra) 08/07/2013    Polio IPV (IPOL) 2001, 2001, 08/16/2002, 07/17/2006    Tdap (Boostrix, Adacel) 08/07/2013    Varicella (Varivax) 09/18/2020, 12/23/2020       FAMILY HISTORY     Patient's family history includes Arthritis in her maternal grandfather; Asthma in her mother; Cancer in her maternal grandfather; Diabetes in her maternal grandfather; Early Death in her paternal grandmother; Heart Disease in her maternal grandfather and paternal grandmother; High Blood Pressure in her maternal grandfather and paternal grandmother; High Cholesterol in her paternal grandmother; Kidney Disease in her maternal grandfather; Stroke in her maternal grandfather. SOCIAL HISTORY     Patient  reports that she has never smoked. She has never used smokeless tobacco. She reports that she does not drink alcohol and does not use drugs.     PHYSICAL EXAM     ED TRIAGE VITALS  BP: 134/86, Temp: 97.5 °F (36.4 °C), Pulse: 86, Resp: 16, SpO2: 97 %,Estimated body mass index is 40.34 kg/m² as calculated from the following:    Height as of 3/17/21: 5' 4\" (1.626 m). Weight as of 3/17/21: 235 lb (106.6 kg). ,Patient's last menstrual period was 10/29/2021. Physical Exam  Constitutional:       General: She is not in acute distress. Appearance: She is normal weight. She is not ill-appearing. HENT:      Head: Normocephalic. Mouth/Throat:      Mouth: Mucous membranes are moist.   Eyes:      Pupils: Pupils are equal, round, and reactive to light. Cardiovascular:      Rate and Rhythm: Normal rate. Pulmonary:      Effort: Pulmonary effort is normal.   Musculoskeletal:      Left knee: Tenderness present over the medial joint line. No lateral joint line tenderness. No LCL laxity, MCL laxity, ACL laxity or PCL laxity. Normal alignment. Skin:     General: Skin is warm and dry. Capillary Refill: Capillary refill takes less than 2 seconds. Neurological:      General: No focal deficit present. Mental Status: She is alert. Psychiatric:         Mood and Affect: Mood normal.         Behavior: Behavior normal.         DIAGNOSTIC RESULTS     Labs:No results found for this visit on 11/24/21. IMAGING:    XR KNEE LEFT (MIN 4 VIEWS)   Final Result   1. Unremarkable radiographs of the left knee            **This report has been created using voice recognition software. It may contain minor errors which are inherent in voice recognition technology. **      Final report electronically signed by Dr Salena Huang on 11/24/2021 5:28 PM            EKG:      URGENT CARE COURSE:     Vitals:    11/24/21 1657 11/24/21 1703   BP: (!) 131/95 134/86   Pulse: 86    Resp: 16    Temp: 97.5 °F (36.4 °C)    TempSrc: Infrared    SpO2: 97%        Medications - No data to display         PROCEDURES:  None    FINAL IMPRESSION      1. Sprain of left knee, unspecified ligament, sequela          DISPOSITION/ PLAN   Patient presents for was likely a sprain of the left knee. Patient be placed on naproxen. Ace wrap. Advised to RICE.   Follow-up with orthopedics next week if no improvement.         PATIENT REFERRED TO:  Maryam Barboza MD  [address] / [facility] [address]      DISCHARGE MEDICATIONS:  Discharge Medication List as of 11/24/2021  5:40 PM      START taking these medications    Details   naproxen (NAPROSYN) 500 MG tablet Take 1 tablet by mouth 2 times daily (with meals), Disp-60 tablet, R-0Normal             Discharge Medication List as of 11/24/2021  5:40 PM      STOP taking these medications       ibuprofen (ADVIL;MOTRIN) 600 MG tablet Comments:   Reason for Stopping:               Discharge Medication List as of 11/24/2021  5:40 PM          LASHAWN Rosado CNP    (Please note that portions of this note were completed with a voice recognition program. Efforts were made to edit the dictations but occasionally words are mis-transcribed.)          LASHAWN Rosado CNP  11/24/21 2971

## 2021-11-24 NOTE — PROGRESS NOTES
Educated patient on use of 6\" ace wrap to left knee. Discharge instructions reviewed. Patient verbalized understanding.

## 2021-12-07 ENCOUNTER — HOSPITAL ENCOUNTER (OUTPATIENT)
Dept: MRI IMAGING | Age: 21
Discharge: HOME | End: 2021-12-07
Payer: COMMERCIAL

## 2021-12-07 DIAGNOSIS — S83.8X2A SPRAIN OF PATELLA, LEFT, INITIAL ENCOUNTER: ICD-10-CM

## 2021-12-07 PROCEDURE — 73721 MRI JNT OF LWR EXTRE W/O DYE: CPT

## 2021-12-09 ENCOUNTER — HOSPITAL ENCOUNTER (OUTPATIENT)
Age: 21
Discharge: HOME | End: 2021-12-09
Payer: COMMERCIAL

## 2021-12-09 ENCOUNTER — CLINICAL DOCUMENTATION (OUTPATIENT)
Dept: PULMONOLOGY | Age: 21
End: 2021-12-09

## 2021-12-09 DIAGNOSIS — R53.83 FATIGUE, UNSPECIFIED TYPE: ICD-10-CM

## 2021-12-09 DIAGNOSIS — J02.9 SORE THROAT: ICD-10-CM

## 2021-12-09 DIAGNOSIS — J02.9 PHARYNGITIS, UNSPECIFIED ETIOLOGY: ICD-10-CM

## 2021-12-09 DIAGNOSIS — J02.9 SORE THROAT: Primary | ICD-10-CM

## 2021-12-09 LAB
GROUP A STREP CULTURE, REFLEX: NEGATIVE
REFLEX THROAT C + S: NORMAL

## 2021-12-09 PROCEDURE — 87880 STREP A ASSAY W/OPTIC: CPT

## 2021-12-09 PROCEDURE — 87070 CULTURE OTHR SPECIMN AEROBIC: CPT

## 2021-12-09 RX ORDER — AMOXICILLIN 500 MG/1
500 CAPSULE ORAL 2 TIMES DAILY
Qty: 20 CAPSULE | Refills: 0 | Status: SHIPPED | OUTPATIENT
Start: 2021-12-09 | End: 2021-12-19

## 2021-12-10 ENCOUNTER — HOSPITAL ENCOUNTER (OUTPATIENT)
Age: 21
Discharge: HOME | End: 2021-12-10
Payer: COMMERCIAL

## 2021-12-10 ENCOUNTER — PATIENT MESSAGE (OUTPATIENT)
Dept: FAMILY MEDICINE CLINIC | Age: 21
End: 2021-12-10

## 2021-12-10 DIAGNOSIS — R09.81 NASAL CONGESTION: ICD-10-CM

## 2021-12-10 DIAGNOSIS — R09.81 NASAL CONGESTION: Primary | ICD-10-CM

## 2021-12-10 PROCEDURE — 87636 SARSCOV2 & INF A&B AMP PRB: CPT

## 2021-12-10 NOTE — TELEPHONE ENCOUNTER
From: Carlos A Renee  To: Aureilano Collado MD  Sent: 12/10/2021 10:41 AM EST  Subject: COVID-19 and Flu Testing    Hi, I am currently home sick from work. I was wondering if I could have Elenita or anyone put in an order for a rapid covid test and a flu test? My symptoms are: nasal congestion, sore throat, fatigue, weakness, no fever, productive cough and headache. If you have any questions let me know. Thanks,  Edward Schaefer

## 2021-12-11 LAB
INFLUENZA A: NOT DETECTED
INFLUENZA B: NOT DETECTED
SARS-COV-2 RNA, RT PCR: NOT DETECTED
THROAT/NOSE CULTURE: NORMAL

## 2021-12-12 ENCOUNTER — PATIENT MESSAGE (OUTPATIENT)
Dept: FAMILY MEDICINE CLINIC | Age: 21
End: 2021-12-12

## 2021-12-13 NOTE — TELEPHONE ENCOUNTER
From: Samantha Bhardwaj  To: Nelly Maya MD  Sent: 12/12/2021 6:03 PM EST  Subject: PCOS weight help    Hi, I was diagnosed with PCOS in January this year and have been trying to lose weight through exercise, lower carb foods and metformin prescribed by my OBGYN. Nothing has helped so far and I was wondering if Ozempic would be a good option for me since I am pre-diabetic and do have PCOS. Feel free to message me back on [employer] or call my cell at [phone], if I do not answer feel free to leave a message with any questions or concerns you may have. Thanks, Edward Newsome

## 2021-12-16 ENCOUNTER — NURSE ONLY (OUTPATIENT)
Dept: LAB | Age: 21
End: 2021-12-16

## 2021-12-16 ENCOUNTER — OFFICE VISIT (OUTPATIENT)
Dept: FAMILY MEDICINE CLINIC | Age: 21
End: 2021-12-16
Payer: COMMERCIAL

## 2021-12-16 ENCOUNTER — TELEPHONE (OUTPATIENT)
Dept: FAMILY MEDICINE CLINIC | Age: 21
End: 2021-12-16

## 2021-12-16 VITALS
HEART RATE: 89 BPM | DIASTOLIC BLOOD PRESSURE: 76 MMHG | HEIGHT: 64 IN | RESPIRATION RATE: 16 BRPM | OXYGEN SATURATION: 98 % | BODY MASS INDEX: 41.62 KG/M2 | SYSTOLIC BLOOD PRESSURE: 126 MMHG | WEIGHT: 243.8 LBS | TEMPERATURE: 97 F

## 2021-12-16 DIAGNOSIS — E28.2 PCOS (POLYCYSTIC OVARIAN SYNDROME): Primary | ICD-10-CM

## 2021-12-16 DIAGNOSIS — E28.2 PCOS (POLYCYSTIC OVARIAN SYNDROME): ICD-10-CM

## 2021-12-16 DIAGNOSIS — J06.9 VIRAL URI: ICD-10-CM

## 2021-12-16 LAB
ALBUMIN SERPL-MCNC: 4.7 G/DL (ref 3.5–5.1)
ALP BLD-CCNC: 83 U/L (ref 38–126)
ALT SERPL-CCNC: 46 U/L (ref 11–66)
ANION GAP SERPL CALCULATED.3IONS-SCNC: 13 MEQ/L (ref 8–16)
AST SERPL-CCNC: 40 U/L (ref 5–40)
BILIRUB SERPL-MCNC: 0.4 MG/DL (ref 0.3–1.2)
BUN BLDV-MCNC: 10 MG/DL (ref 7–22)
CALCIUM SERPL-MCNC: 9.3 MG/DL (ref 8.5–10.5)
CHLORIDE BLD-SCNC: 105 MEQ/L (ref 98–111)
CO2: 23 MEQ/L (ref 23–33)
CREAT SERPL-MCNC: 0.7 MG/DL (ref 0.4–1.2)
ERYTHROCYTE [DISTWIDTH] IN BLOOD BY AUTOMATED COUNT: 11.4 % (ref 11.5–14.5)
ERYTHROCYTE [DISTWIDTH] IN BLOOD BY AUTOMATED COUNT: 39.4 FL (ref 35–45)
GFR SERPL CREATININE-BSD FRML MDRD: > 90 ML/MIN/1.73M2
GLUCOSE BLD-MCNC: 80 MG/DL (ref 70–108)
HBA1C MFR BLD: 4.8 % (ref 4.3–5.7)
HCT VFR BLD CALC: 43.8 % (ref 37–47)
HEMOGLOBIN: 14.3 GM/DL (ref 12–16)
MCH RBC QN AUTO: 31.2 PG (ref 26–33)
MCHC RBC AUTO-ENTMCNC: 32.6 GM/DL (ref 32.2–35.5)
MCV RBC AUTO: 95.4 FL (ref 81–99)
PLATELET # BLD: 264 THOU/MM3 (ref 130–400)
PMV BLD AUTO: 10.5 FL (ref 9.4–12.4)
POTASSIUM SERPL-SCNC: 4.4 MEQ/L (ref 3.5–5.2)
RBC # BLD: 4.59 MILL/MM3 (ref 4.2–5.4)
SODIUM BLD-SCNC: 141 MEQ/L (ref 135–145)
T4 FREE: 1.23 NG/DL (ref 0.93–1.76)
TOTAL PROTEIN: 7.3 G/DL (ref 6.1–8)
TSH SERPL DL<=0.05 MIU/L-ACNC: 1.23 UIU/ML (ref 0.4–4.2)
WBC # BLD: 7.6 THOU/MM3 (ref 4.8–10.8)

## 2021-12-16 PROCEDURE — G8417 CALC BMI ABV UP PARAM F/U: HCPCS | Performed by: STUDENT IN AN ORGANIZED HEALTH CARE EDUCATION/TRAINING PROGRAM

## 2021-12-16 PROCEDURE — 83036 HEMOGLOBIN GLYCOSYLATED A1C: CPT | Performed by: STUDENT IN AN ORGANIZED HEALTH CARE EDUCATION/TRAINING PROGRAM

## 2021-12-16 PROCEDURE — G8484 FLU IMMUNIZE NO ADMIN: HCPCS | Performed by: STUDENT IN AN ORGANIZED HEALTH CARE EDUCATION/TRAINING PROGRAM

## 2021-12-16 PROCEDURE — G8427 DOCREV CUR MEDS BY ELIG CLIN: HCPCS | Performed by: STUDENT IN AN ORGANIZED HEALTH CARE EDUCATION/TRAINING PROGRAM

## 2021-12-16 PROCEDURE — 1036F TOBACCO NON-USER: CPT | Performed by: STUDENT IN AN ORGANIZED HEALTH CARE EDUCATION/TRAINING PROGRAM

## 2021-12-16 PROCEDURE — 99213 OFFICE O/P EST LOW 20 MIN: CPT | Performed by: STUDENT IN AN ORGANIZED HEALTH CARE EDUCATION/TRAINING PROGRAM

## 2021-12-16 RX ORDER — METFORMIN HYDROCHLORIDE 500 MG/1
500 TABLET, EXTENDED RELEASE ORAL
COMMUNITY
Start: 2021-10-25 | End: 2022-03-15

## 2021-12-16 SDOH — ECONOMIC STABILITY: FOOD INSECURITY: WITHIN THE PAST 12 MONTHS, YOU WORRIED THAT YOUR FOOD WOULD RUN OUT BEFORE YOU GOT MONEY TO BUY MORE.: NEVER TRUE

## 2021-12-16 SDOH — ECONOMIC STABILITY: FOOD INSECURITY: WITHIN THE PAST 12 MONTHS, THE FOOD YOU BOUGHT JUST DIDN'T LAST AND YOU DIDN'T HAVE MONEY TO GET MORE.: NEVER TRUE

## 2021-12-16 ASSESSMENT — ENCOUNTER SYMPTOMS
COLOR CHANGE: 0
SHORTNESS OF BREATH: 0
ABDOMINAL PAIN: 0
VOMITING: 0
NAUSEA: 0
COUGH: 0
SINUS PRESSURE: 1

## 2021-12-16 ASSESSMENT — SOCIAL DETERMINANTS OF HEALTH (SDOH): HOW HARD IS IT FOR YOU TO PAY FOR THE VERY BASICS LIKE FOOD, HOUSING, MEDICAL CARE, AND HEATING?: NOT HARD AT ALL

## 2021-12-16 NOTE — PROGRESS NOTES
[address] [facility]. [address] [place] [address]  Dept: [phone]  Dept Fax: [phone]  Loc: [phone]    Kat Mackenzie is a 22 y.o. female who presents today for:  Chief Complaint   Patient presents with    Follow-up     would like to discuss starting a medication for PCOS        HPI:   Follow-up today:    PCOS: Follows with Ob/Gyn. Started on Metformin 2,000mg daily initially. U/S showed cysts. LMP 12/14 but they are irregular - every other month. Painful with cramping. Now on Metformin 500mg. Ob/Gyn won't start her on Clomiphene until she is trying for a month. Trying to lose weight with the Metformin x 2 months. Consistently exercising with weight training. Trying to do less carbs since September. Gained weight instead. Is not calorie restricting otherwise. Trying to get pregnant x 1 month. More consistent with intercourse and tracking with her ovulation calendar. No protection or birth control. Her partner will not get checked. Viral URI: Strep, COVID and flu negative that was ordered. Taking Amoxicillin. A lot of sinus pressure and drainage. No fevers. Trying to do Netti pot.      Past Medical History:   Diagnosis Date    Anxiety     Impingement syndrome involving patellar fat pad of left knee 2021    Multiple allergies     PCOS (polycystic ovarian syndrome)     Pilonidal cyst       Past Surgical History:   Procedure Laterality Date    CYST INCISION AND DRAINAGE  10/10/2020    pilonidal cyst-- H    MYRINGOTOMY AND TYMPANOSTOMY TUBE PLACEMENT      20mos old- bilateral    PILONIDAL CYST EXCISION N/A 11/24/2020    PILONIDAL CYSTECTOMY performed by Skye Stoner MD at [address] [place] [address]  10/23/2017    SEPTOPLASTY N/A 10/23/2017    SEPTOPLASTY, POSSIBLE SUBMUCOUS RESECT TURBINATES PARTIAL LEFT performed by Jazmin Cuello MD at [facility] 11/20/12    Dr Nina Cantrell     Family History   Problem Relation Age of Onset    Asthma Mother     Diabetes Maternal Grandfather     Heart Disease Maternal Grandfather     High Blood Pressure Maternal Grandfather     Kidney Disease Maternal Grandfather     Cancer Maternal Grandfather     Arthritis Maternal Grandfather     Stroke Maternal Grandfather     Heart Disease Paternal Grandmother     High Blood Pressure Paternal Grandmother     High Cholesterol Paternal Grandmother     Early Death Paternal Grandmother      Social History     Tobacco Use    Smoking status: Never Smoker    Smokeless tobacco: Never Used   Substance Use Topics    Alcohol use: No      Current Outpatient Medications   Medication Sig Dispense Refill    metFORMIN (GLUCOPHAGE-XR) 500 MG extended release tablet       amoxicillin (AMOXIL) 500 MG capsule Take 1 capsule by mouth 2 times daily for 10 days 20 capsule 0    ondansetron (ZOFRAN ODT) 4 MG disintegrating tablet Take 1 tablet by mouth every 8 hours as needed for Nausea 20 tablet 1     No current facility-administered medications for this visit. Allergies   Allergen Reactions    Dog Epithelium     Dust Mite Extract     Molds & Smuts     Other      Weeds Other reaction(s): Unknown    Seasonal     Tree Extract        Health Maintenance   Topic Date Due    Hepatitis C screen  Never done    HIV screen  Never done    Chlamydia screen  Never done    COVID-19 Vaccine (2 - Moderna 3-dose booster series) 01/01/2022    DTaP/Tdap/Td vaccine (7 - Td or Tdap) 08/07/2023    Hepatitis B vaccine  Completed    Hib vaccine  Completed    HPV vaccine  Completed    Varicella vaccine  Completed    Meningococcal (ACWY) vaccine  Completed    Flu vaccine  Completed    Hepatitis A vaccine  Aged Out    Pneumococcal 0-64 years Vaccine  Aged Out       Subjective:      Review of Systems   Constitutional: Negative for chills and fever. HENT: Positive for postnasal drip and sinus pressure. Respiratory: Negative for cough and shortness of breath. Gastrointestinal: Negative for abdominal pain, nausea and vomiting. Skin: Negative for color change and rash. Neurological: Negative for light-headedness and headaches. Psychiatric/Behavioral: Negative for decreased concentration. The patient is not nervous/anxious. Objective:     Vitals:    12/16/21 0924   BP: 126/76   Site: Right Upper Arm   Position: Sitting   Cuff Size: Large Adult   Pulse: 89   Resp: 16   Temp: 97 °F (36.1 °C)   TempSrc: Temporal   SpO2: 98%   Weight: 243 lb 12.8 oz (110.6 kg)   Height: 5' 4\" (1.626 m)       Body mass index is 41.85 kg/m². Wt Readings from Last 3 Encounters:   12/16/21 243 lb 12.8 oz (110.6 kg)   03/17/21 235 lb (106.6 kg) (>99 %, Z= 2.38)*   02/12/21 232 lb (105.2 kg) (>99 %, Z= 2.34)*     * Growth percentiles are based on CDC (Girls, 2-20 Years) data. BP Readings from Last 3 Encounters:   12/16/21 126/76   11/24/21 134/86   03/17/21 116/78       Physical Exam  Vitals reviewed. Constitutional:       Appearance: She is obese. HENT:      Head: Normocephalic and atraumatic. Right Ear: External ear normal.      Left Ear: External ear normal.      Mouth/Throat:      Pharynx: Posterior oropharyngeal erythema present. Eyes:      Conjunctiva/sclera: Conjunctivae normal.   Cardiovascular:      Pulses: Normal pulses. Heart sounds: Normal heart sounds. Pulmonary:      Effort: Pulmonary effort is normal.      Breath sounds: Normal breath sounds. Skin:     General: Skin is warm. Neurological:      Mental Status: She is alert and oriented to person, place, and time.    Psychiatric:         Mood and Affect: Mood normal.         Behavior: Behavior normal.         Lab Results   Component Value Date    WBC 10.0 01/13/2020    HGB 13.6 01/13/2020    HCT 41.7 01/13/2020     01/13/2020    AST 22 01/13/2020     01/13/2020    K 4.0 01/13/2020     01/13/2020    CREATININE 0.6 01/13/2020    BUN 8 01/13/2020    CO2 24 01/13/2020    TSH 1.780 01/13/2020    LABA1C 4.8 12/16/2021    CALCIUM 9.3 01/13/2020         Assessment / Plan:     Edward was seen today for follow-up. Diagnoses and all orders for this visit:    PCOS (polycystic ovarian syndrome):  - A1c 4.8% in office today, no evidence of prediabetes or diabetes. - Discussed medication regimen to help with weight loss. Given patient is not diabetic, insurance would not cover cost of Ozempic or other such medications. - Patient actively trying to get pregnant and cannot be placed on Phentermine or Topamax for these reasons. Patient would need to be on birth control.   - Discussed lifestyle modifications in length with diet and exercise. Will trial IF and calorie restriction. Increase cardio exercise. - Will recheck basic labs  -     POCT glycosylated hemoglobin (Hb A1C)  -     TSH; Future  -     T4, Free; Future  -     Comprehensive Metabolic Panel; Future  -     CBC; Future    Viral URI:  - Supportive care recommended. Prescribed Amoxicillin by another provider. Complete course - if no improvement, will let us know. Return in about 2 months (around 2/16/2022). Medications Prescribed:  No orders of the defined types were placed in this encounter. Future Appointments   Date Time Provider Xiao Johnson   2/24/2022  3:40 PM [facility], [facility]       Patient given educational materials - see patient instructions. Discussed use, benefit, and sideeffects of prescribed medications. All patient questions answered. Pt voiced understanding. Reviewed health maintenance. Instructed to continue current medications, diet and exercise. Patient agreed with treatment plan. Follow up as directed.      Electronically signed by Rolene Koyanagi, MD on 12/16/2021 at 11:05 AM

## 2021-12-16 NOTE — PATIENT INSTRUCTIONS
Thank you   1. Thank you for trusting us with your healthcare needs. You may receive a survey regarding today's visit. It would help us out if you would take a few moments to provide your feedback. We value your input. 2. Please bring in ALL medications BOTTLES, including inhalers, herbal supplements, over the counter, prescribed & non-prescribed medicine. The office would like actual medication bottles and a list.   3. Please note our OFFICE POLICIES:   a. Prior to getting your labs drawn, please check with your insurance company for benefits and eligibility of lab services. Often, insurance companies cover certain tests for preventative visits only. It is patient's responsibility to see what is covered. b. We are unable to change a diagnosis after the test has been performed. c. Lab orders will not be re-printed. Please hold onto your original lab orders and take them to your lab to be completed. d. If you no show your scheduled appointment three times, you will be dismissed from this practice. e. Marcial Rasp must be completed 24 hours prior to your schedule appointment. 4. If the list below has been completed, PLEASE FAX RECORDS TO OUR OFFICE @ [phone]. Once the records have been received we will update your records at our office:  Health Maintenance Due   Topic Date Due    Hepatitis C screen  Never done    HIV screen  Never done    Chlamydia screen  Never done           Patient Education        7 Tips for Eating Healthy When [facility] All the Time    Make quick meals on busy nights. Try recipes that are simple to make and easy to clean up, like pasta, soups, or casseroles. These dishes can often be made ahead of time and are easy to reheat when you're short on time. Buy foods that last.  Choose fresh foods, such as onions, garlic, and potatoes. You can also reach for frozen, canned, and dried foods. Foods like these last and can help you pull a healthy meal together quickly.      Uli Andino something new. Try checking out cookbooks from [employer]. Or search for new recipes online. You can also change the ingredients in an old favorite recipe. Or switch the seasonings to create something new. Try theme nights. Try \"Taco Tuesday. \" Do \"Meatless Monday\" for a vegetarian meal each week. And save \"Leftovers Night\" for days when you don't want to cook. Let your favorite dishes guide you. Then make them again every few weeks. Uli Andino with a friend. Maybe you know someone who's feeling the same way about healthy eating. Pick a recipe and schedule a night to make it \"together. \" You can also video call while you cook or eat. Share food with other people. If you like cooking and baking, you can share what you've made. Split up what you've made and keep only what you want to eat. You can wrap up the rest to share with a friend, neighbor, or family member. Aim for balance, variety, and moderation. On most days, eat from each food groupgrains, protein foods, vegetables, fruits, and dairy. And choose different foods from each group. For example, if you often eat apples, try reaching for a banana instead. All foods, if you eat them in moderation, can be part of healthy eating. Current as of: December 17, 2020               Content Version: 13.0  © 2006-2021 [employer]wise[employer] Incorporated. Care instructions adapted under license by [facility] ([facility]). If you have questions about a medical condition or this instruction, always ask your healthcare professional. [place] [address] any warranty or liability for your use of this information.

## 2021-12-16 NOTE — PROGRESS NOTES
S: 22 y.o. female with   Chief Complaint   Patient presents with    Follow-up     would like to discuss starting a medication for PCOS        HPI: please see resident note for HPI and ROS. BP Readings from Last 3 Encounters:   12/16/21 126/76   11/24/21 134/86   03/17/21 116/78     Wt Readings from Last 3 Encounters:   12/16/21 243 lb 12.8 oz (110.6 kg)   03/17/21 235 lb (106.6 kg) (>99 %, Z= 2.38)*   02/12/21 232 lb (105.2 kg) (>99 %, Z= 2.34)*     * Growth percentiles are based on CDC (Girls, 2-20 Years) data. O: VS:  height is 5' 4\" (1.626 m) and weight is 243 lb 12.8 oz (110.6 kg). Her temporal temperature is 97 °F (36.1 °C). Her blood pressure is 126/76 and her pulse is 89. Her respiration is 16 and oxygen saturation is 98%. Body mass index is 41.85 kg/m². Diagnosis Orders   1. PCOS (polycystic ovarian syndrome)         Plan:  Encouraged to speak with  about semen analysis. Diet and exercise recommended with a focus on diet. No medications are safe in pregnancy except wellbutrin and metformin. Health Maintenance Due   Topic Date Due    Hepatitis C screen  Never done    HIV screen  Never done    Chlamydia screen  Never done       Attending Physician Statement  I have discussed the case, including pertinent history and exam findings with the resident. I agree with the documented assessment and plan as documented by the resident.         Mary Brown,  12/16/2021 9:53 AM

## 2021-12-16 NOTE — PROGRESS NOTES
Health Maintenance Due   Topic Date Due    Hepatitis C screen  Never done Declined    HIV screen  Never done Declined    Chlamydia screen  Never done Declined

## 2021-12-16 NOTE — TELEPHONE ENCOUNTER
----- Message from Kamilah Greenwood MD sent at 12/13/2021  8:11 AM EST -----  COVID and Flu negative.

## 2021-12-18 ENCOUNTER — TELEPHONE (OUTPATIENT)
Dept: FAMILY MEDICINE CLINIC | Age: 21
End: 2021-12-18

## 2021-12-18 NOTE — TELEPHONE ENCOUNTER
----- Message from Aureliano Collado MD sent at 12/17/2021  8:02 AM EST -----  Hi Mikeia,     Your lab work all looks great!     Dr. Macon Baumgarten
Written by Kinga Pal MD on 12/17/2021  8:02 AM EST View Full Comments  Seen by patient Jo-Ann Arthur on 12/17/2021  8:03 AM
major surgery lasting 2-3 hrs/present cancer or chemotherapy

## 2022-01-25 ENCOUNTER — HOSPITAL ENCOUNTER (EMERGENCY)
Age: 22
Discharge: HOME | End: 2022-01-25
Payer: COMMERCIAL

## 2022-01-25 VITALS
BODY MASS INDEX: 41.02 KG/M2 | RESPIRATION RATE: 16 BRPM | SYSTOLIC BLOOD PRESSURE: 144 MMHG | TEMPERATURE: 97.8 F | OXYGEN SATURATION: 99 % | DIASTOLIC BLOOD PRESSURE: 90 MMHG | WEIGHT: 239 LBS | HEART RATE: 87 BPM

## 2022-01-25 DIAGNOSIS — M54.42 ACUTE BILATERAL LOW BACK PAIN WITH LEFT-SIDED SCIATICA: Primary | ICD-10-CM

## 2022-01-25 PROCEDURE — 99213 OFFICE O/P EST LOW 20 MIN: CPT

## 2022-01-25 PROCEDURE — 99213 OFFICE O/P EST LOW 20 MIN: CPT | Performed by: NURSE PRACTITIONER

## 2022-01-25 RX ORDER — CYCLOBENZAPRINE HCL 10 MG
10 TABLET ORAL 3 TIMES DAILY PRN
Qty: 15 TABLET | Refills: 0 | Status: SHIPPED | OUTPATIENT
Start: 2022-01-25 | End: 2022-02-04

## 2022-01-25 RX ORDER — PREDNISONE 10 MG/1
TABLET ORAL
Qty: 18 TABLET | Refills: 0 | Status: SHIPPED | OUTPATIENT
Start: 2022-01-25 | End: 2022-02-01

## 2022-01-25 ASSESSMENT — ENCOUNTER SYMPTOMS
ABDOMINAL DISTENTION: 0
ABDOMINAL PAIN: 0
BACK PAIN: 1
SHORTNESS OF BREATH: 0

## 2022-01-25 ASSESSMENT — PAIN SCALES - GENERAL: PAINLEVEL_OUTOF10: 7

## 2022-01-25 NOTE — Clinical Note
Geno Davis was seen and treated in our emergency department on 1/25/2022. She may return to work on 01/27/2022. If you have any questions or concerns, please don't hesitate to call.       Didier Razo, APRN - CNP

## 2022-01-25 NOTE — ED PROVIDER NOTES
[id number]       Chief Complaint   Patient presents with    Back Pain     constant - shoot downs back of left leg       Nurses Notes reviewed and I agree except as noted in the HPI. HISTORY OF PRESENT ILLNESS   Joce Donovan is a 22 y.o. female who presents for evaluation of low back pain. Onset of symptoms between 5 and 7 days ago, worsening. No known injury. Pain is aching, continuous. Rates it 7/10. Pain radiates down left leg. No weakness or numbness/tingling. Patient states that she had went to the chiropractic office yesterday, however, they only adjusted her neck and did a TENS unit on her neck. No treatment to her lower back/hip regions. No trouble controlling bowel or bladder. No improvement with current treatment. REVIEW OF SYSTEMS     Review of Systems   Constitutional: Negative for fever. Respiratory: Negative for shortness of breath. Cardiovascular: Negative for chest pain. Gastrointestinal: Negative for abdominal distention and abdominal pain. Genitourinary: Negative for difficulty urinating. Musculoskeletal: Positive for arthralgias and back pain. Negative for joint swelling, neck pain and neck stiffness. Skin: Negative for rash. Neurological: Negative for weakness and numbness. Hematological: Does not bruise/bleed easily. Psychiatric/Behavioral: Positive for sleep disturbance. PAST MEDICAL HISTORY         Diagnosis Date    Anxiety     Impingement syndrome involving patellar fat pad of left knee 2021    Multiple allergies     PCOS (polycystic ovarian syndrome)     Pilonidal cyst        SURGICAL HISTORY     Patient  has a past surgical history that includes Myringotomy Tympanostomy Tube Placement; Tonsillectomy and adenoidectomy (11/20/12); Septoplasty (10/23/2017);  Septoplasty (N/A, 10/23/2017); cyst incision and drainage (10/10/2020); and Pilonidal cyst excision (N/A, 11/24/2020). CURRENT MEDICATIONS       Discharge Medication List as of 1/25/2022  5:13 PM      CONTINUE these medications which have NOT CHANGED    Details   metFORMIN (GLUCOPHAGE-XR) 500 MG extended release tablet Historical Med      ondansetron (ZOFRAN ODT) 4 MG disintegrating tablet Take 1 tablet by mouth every 8 hours as needed for Nausea, Disp-20 tablet, R-1Normal             ALLERGIES     Patient is is allergic to dog epithelium, dust mite extract, molds & smuts, other, seasonal, and tree extract. FAMILY HISTORY     Patient'sfamily history includes Arthritis in her maternal grandfather; Asthma in her mother; Cancer in her maternal grandfather; Diabetes in her maternal grandfather; Early Death in her paternal grandmother; Heart Disease in her maternal grandfather and paternal grandmother; High Blood Pressure in her maternal grandfather and paternal grandmother; High Cholesterol in her paternal grandmother; Kidney Disease in her maternal grandfather; Stroke in her maternal grandfather. SOCIAL HISTORY     Patient  reports that she has never smoked. She has never used smokeless tobacco. She reports that she does not drink alcohol and does not use drugs. PHYSICAL EXAM     ED TRIAGE VITALS  BP: (!) 144/90, Temp: 97.8 °F (36.6 °C), Pulse: 87, Resp: 16, SpO2: 99 %  Physical Exam  Vitals and nursing note reviewed. Constitutional:       General: She is not in acute distress. Appearance: Normal appearance. HENT:      Head: Normocephalic and atraumatic. Right Ear: External ear normal.      Left Ear: External ear normal.      Nose: No congestion. Eyes:      General: No scleral icterus. Conjunctiva/sclera: Conjunctivae normal.   Pulmonary:      Effort: Pulmonary effort is normal. No respiratory distress. Musculoskeletal:      Cervical back: Normal range of motion. Lumbar back: Tenderness (bilateral paraspinal, L > R) present. No swelling, deformity or bony tenderness.  Decreased range of motion. Right hip: Normal.      Left hip: Tenderness (left SI joint pain) present. No deformity or bony tenderness. Normal range of motion. Skin:     General: Skin is warm and dry. Capillary Refill: Capillary refill takes less than 2 seconds. Coloration: Skin is not jaundiced. Findings: No rash. Neurological:      Mental Status: She is alert and oriented to person, place, and time. Sensory: Sensation is intact. Psychiatric:         Mood and Affect: Mood normal.         Behavior: Behavior normal. Behavior is cooperative. DIAGNOSTIC RESULTS   Labs: No results found for this visit on 01/25/22. IMAGING:  No orders to display     URGENT CARE COURSE:     Vitals:    01/25/22 1656   BP: (!) 144/90   Pulse: 87   Resp: 16   Temp: 97.8 °F (36.6 °C)   SpO2: 99%   Weight: 239 lb (108.4 kg)       Medications - No data to display  PROCEDURES:  None  FINALIMPRESSION      1. Acute bilateral low back pain with left-sided sciatica        DISPOSITION/PLAN   DISPOSITION Decision To Discharge 01/25/2022 05:11:04 PM  Nontoxic, no distress. No cauda equina. Exam consistent with low back pain with sciatica. Medications as prescribed. Continue TENS unit. Consider visit to chiropractic office for evaluation. If any distress go to ER. PATIENT REFERRED TO:  Kay Mccartney MD  [address] [place] [address]  [phone]      Follow-up with PCP as needed. Medications as prescribed. No driving up to 8 hours after Flexeril. Continue heat, and ice. Continue TENS unit. Gentle stretching 3 times daily. Possible chiropractic visit. If symptoms worsen go to ER.     DISCHARGE MEDICATIONS:  Discharge Medication List as of 1/25/2022  5:13 PM      START taking these medications    Details   cyclobenzaprine (FLEXERIL) 10 MG tablet Take 1 tablet by mouth 3 times daily as needed for Muscle spasms, Disp-15 tablet, R-0Normal      predniSONE (DELTASONE) 10 MG tablet Take 4 tablets by mouth daily for 2 days, THEN 3 tablets daily for 2 days, THEN 1 tablet daily for 2 days, THEN 2 tablets daily for 1 day., Disp-18 tablet, R-0Normal           Discharge Medication List as of 1/25/2022  5:13 PM          LASHAWN Ashley CNP, APRN - CNP  01/25/22 1804

## 2022-01-27 ENCOUNTER — OFFICE VISIT (OUTPATIENT)
Dept: FAMILY MEDICINE CLINIC | Age: 22
End: 2022-01-27
Payer: COMMERCIAL

## 2022-01-27 VITALS
OXYGEN SATURATION: 98 % | SYSTOLIC BLOOD PRESSURE: 132 MMHG | DIASTOLIC BLOOD PRESSURE: 80 MMHG | TEMPERATURE: 97.1 F | WEIGHT: 246.6 LBS | RESPIRATION RATE: 16 BRPM | HEART RATE: 96 BPM | HEIGHT: 64 IN | BODY MASS INDEX: 42.1 KG/M2

## 2022-01-27 DIAGNOSIS — M53.3 SACROILIAC JOINT PAIN: Primary | ICD-10-CM

## 2022-01-27 DIAGNOSIS — M99.05 SOMATIC DYSFUNCTION OF PELVIS REGION: ICD-10-CM

## 2022-01-27 PROCEDURE — G8484 FLU IMMUNIZE NO ADMIN: HCPCS | Performed by: STUDENT IN AN ORGANIZED HEALTH CARE EDUCATION/TRAINING PROGRAM

## 2022-01-27 PROCEDURE — 98925 OSTEOPATH MANJ 1-2 REGIONS: CPT | Performed by: STUDENT IN AN ORGANIZED HEALTH CARE EDUCATION/TRAINING PROGRAM

## 2022-01-27 PROCEDURE — 99213 OFFICE O/P EST LOW 20 MIN: CPT | Performed by: STUDENT IN AN ORGANIZED HEALTH CARE EDUCATION/TRAINING PROGRAM

## 2022-01-27 PROCEDURE — G8417 CALC BMI ABV UP PARAM F/U: HCPCS | Performed by: STUDENT IN AN ORGANIZED HEALTH CARE EDUCATION/TRAINING PROGRAM

## 2022-01-27 PROCEDURE — G8427 DOCREV CUR MEDS BY ELIG CLIN: HCPCS | Performed by: STUDENT IN AN ORGANIZED HEALTH CARE EDUCATION/TRAINING PROGRAM

## 2022-01-27 PROCEDURE — 1036F TOBACCO NON-USER: CPT | Performed by: STUDENT IN AN ORGANIZED HEALTH CARE EDUCATION/TRAINING PROGRAM

## 2022-01-27 ASSESSMENT — ENCOUNTER SYMPTOMS
CONSTIPATION: 0
ABDOMINAL PAIN: 0
COUGH: 0
SHORTNESS OF BREATH: 0
EYE PAIN: 0
TROUBLE SWALLOWING: 0
BLOOD IN STOOL: 0
DIARRHEA: 0
BACK PAIN: 1

## 2022-01-27 NOTE — PROGRESS NOTES
S: 22 y.o. female with   Chief Complaint   Patient presents with   [place] [facility] Follow-up     [facility] 01/25/2022 Follow-up Lower back pain Flexeril is not helping       HPI: please see resident note for HPI and ROS. BP Readings from Last 3 Encounters:   01/27/22 132/80   01/25/22 (!) 144/90   12/16/21 126/76     Wt Readings from Last 3 Encounters:   01/27/22 246 lb 9.6 oz (111.9 kg)   01/25/22 239 lb (108.4 kg)   12/16/21 243 lb 12.8 oz (110.6 kg)       O: VS:  height is 5' 4\" (1.626 m) and weight is 246 lb 9.6 oz (111.9 kg). Her temporal temperature is 97.1 °F (36.2 °C). Her blood pressure is 132/80 and her pulse is 96. Her respiration is 16 and oxygen saturation is 98%. Diagnosis Orders   1. Sacroiliac joint pain         Plan:  Cont steroids and flexeril. rtc if not imporving in 6 wks. omt today,. Health Maintenance Due   Topic Date Due    Hepatitis C screen  Never done    HIV screen  Never done    Chlamydia screen  Never done       Attending Physician Statement  I have discussed the case, including pertinent history and exam findings with the resident. I agree with the documented assessment and plan as documented by the resident.         Kay Arteaga DO 1/27/2022 11:55 AM

## 2022-01-27 NOTE — PATIENT INSTRUCTIONS
Thank you   1. Thank you for trusting us with your healthcare needs. You may receive a survey regarding today's visit. It would help us out if you would take a few moments to provide your feedback. We value your input. 2. Please bring in ALL medications BOTTLES, including inhalers, herbal supplements, over the counter, prescribed & non-prescribed medicine. The office would like actual medication bottles and a list.   3. Please note our OFFICE POLICIES:   a. Prior to getting your labs drawn, please check with your insurance company for benefits and eligibility of lab services. Often, insurance companies cover certain tests for preventative visits only. It is patient's responsibility to see what is covered. b. We are unable to change a diagnosis after the test has been performed. c. Lab orders will not be re-printed. Please hold onto your original lab orders and take them to your lab to be completed. d. If you no show your scheduled appointment three times, you will be dismissed from this practice. e. Deidre Miles must be completed 24 hours prior to your schedule appointment. 4. If the list below has been completed, PLEASE FAX RECORDS TO OUR OFFICE @ [phone].  Once the records have been received we will update your records at our office:  Health Maintenance Due   Topic Date Due    Hepatitis C screen  Never done    HIV screen  Never done    Chlamydia screen  Never done

## 2022-01-27 NOTE — PROGRESS NOTES
found for: Suelma Garcia    No results found for: LABMICR, [id number]    Review of Systems   Constitutional: Negative for chills, fatigue and fever. HENT: Negative for congestion, ear pain, postnasal drip and trouble swallowing. Eyes: Negative for pain and visual disturbance. Respiratory: Negative for cough and shortness of breath. Cardiovascular: Negative for chest pain and palpitations. Gastrointestinal: Negative for abdominal pain, blood in stool, constipation and diarrhea. Genitourinary: Negative for dysuria and hematuria. Musculoskeletal: Positive for back pain and gait problem. Negative for myalgias. Skin: Negative for rash and wound. Neurological: Negative for dizziness and headaches. Psychiatric/Behavioral: The patient is not nervous/anxious. Physical Exam  Vitals and nursing note reviewed. Constitutional:       General: She is not in acute distress. Appearance: She is well-developed. She is obese. She is not diaphoretic. HENT:      Head: Normocephalic and atraumatic. Right Ear: External ear normal.      Left Ear: External ear normal.      Nose: Nose normal.   Eyes:      General: No scleral icterus. Right eye: No discharge. Left eye: No discharge. Conjunctiva/sclera: Conjunctivae normal.   Cardiovascular:      Rate and Rhythm: Normal rate and regular rhythm. Heart sounds: Normal heart sounds. No murmur heard. Pulmonary:      Effort: Pulmonary effort is normal.      Breath sounds: Normal breath sounds. Abdominal:      Palpations: Abdomen is soft. Tenderness: There is no abdominal tenderness. Musculoskeletal:      Cervical back: Normal range of motion. Comments: Right anterior innominate and Left on right sacral dysfunction   Skin:     General: Skin is warm and dry. Findings: No erythema or rash. Neurological:      Mental Status: She is alert and oriented to person, place, and time.    Psychiatric: Behavior: Behavior normal.         Thought Content: Thought content normal.         Judgment: Judgment normal.       Procedure: OMT   Risks, benefits, and alternatives explained to the patient. Please see exam above. The patient was positioned in the supine position. ME to right innominate was applied. The patient tolerated the manipulation well. Education with stretches, rest, magnesium, and fluids given to patient. Immunization History   Administered Date(s) Administered    COVID-19, Moderna, Primary or Immunocompromised, PF, 100mcg/0.5mL 12/04/2021, 01/03/2022    DTaP vaccine 2001, 2001, 2001, 08/16/2002, 07/17/2006    HPV 9-valent Alvera Lusher) 08/07/2013, 08/23/2018    Hepatitis B Ped/Adol (Engerix-B, Recombivax HB) 2001, 2001, 2001, 08/16/2002, 07/17/2006    Hib (HbOC) 2001, 2001, 2001, 06/14/2002    Influenza A (D7U2-91) Vaccine Nasal 12/15/2009    Influenza Virus Vaccine 11/12/2021    Influenza, MDCK Quadv, IM, PF (Flucelvax 2 yrs and older) 10/30/2020    MMR 06/14/2002, 07/17/2006    Meningococcal B, OMV (Bexsero) 08/23/2018    Meningococcal MCV4O (Menveo) 08/23/2018    Meningococcal MCV4P (Menactra) 08/07/2013    Polio IPV (IPOL) 2001, 2001, 08/16/2002, 07/17/2006    Tdap (Boostrix, Adacel) 08/07/2013    Varicella (Varivax) 09/18/2020, 12/23/2020       Health Maintenance Due   Topic Date Due    Hepatitis C screen  Never done    HIV screen  Never done    Chlamydia screen  Never done          Food Insecurity: No Food Insecurity    Worried About Running Out of Food in the Last Year: Never true    Ran Out of Food in the Last Year: Never true       Assessment / Plan:      Diagnosis Orders   1. Sacroiliac joint pain  AK OSTEOPATHIC MANIP,1-2 BODY REGN   2. Somatic dysfunction of pelvis region  AK OSTEOPATHIC MANIP,1-2 BODY REGN     OMT today - well tolerated but minimal improvement in symptoms. Counseled that it can take 24-48 for full benefits. Encouraged fluids, magnesium, or epsom salt bath. Continue prednisone and flexeril from UC  Follow up in 3 weeks with PCP. If continued back pain, consider imaging at that point. Return if symptoms worsen or fail to improve. Medications Prescribed:  No orders of the defined types were placed in this encounter. Future Appointments   Date Time Provider Xiao Johnson   2/24/2022  3:40 PM Ray Camejo MD [facility]       Patient given educational materials - see patient instructions. Discussed use, benefit, and sideeffects of prescribed medications. All patient questions answered. Pt voiced understanding. Reviewed health maintenance. Instructed to continue current medications, diet and exercise. Patient agreed with treatment plan. Follow up as directed.      Electronically signed by Leatha Murillo DO on 1/27/2022 at 12:41 PM

## 2022-01-27 NOTE — PROGRESS NOTES
Health Maintenance Due   Topic Date Due    Hepatitis C screen  Never done Declined    HIV screen  Never done Declined    Chlamydia screen  Never done

## 2022-03-07 ENCOUNTER — INITIAL CONSULT (OUTPATIENT)
Dept: PULMONOLOGY | Age: 22
End: 2022-03-07
Payer: COMMERCIAL

## 2022-03-07 VITALS
TEMPERATURE: 98 F | SYSTOLIC BLOOD PRESSURE: 124 MMHG | DIASTOLIC BLOOD PRESSURE: 80 MMHG | HEART RATE: 84 BPM | WEIGHT: 239.6 LBS | BODY MASS INDEX: 40.9 KG/M2 | OXYGEN SATURATION: 98 % | HEIGHT: 64 IN

## 2022-03-07 DIAGNOSIS — G47.10 HYPERSOMNIA: Primary | ICD-10-CM

## 2022-03-07 DIAGNOSIS — R60.0 LEG EDEMA: ICD-10-CM

## 2022-03-07 DIAGNOSIS — R06.89 SLEEP RELATED CHOKING SENSATION: ICD-10-CM

## 2022-03-07 DIAGNOSIS — R40.0 EXCESSIVE SLEEPINESS WHILE DRIVING: ICD-10-CM

## 2022-03-07 PROCEDURE — G8417 CALC BMI ABV UP PARAM F/U: HCPCS | Performed by: INTERNAL MEDICINE

## 2022-03-07 PROCEDURE — G8484 FLU IMMUNIZE NO ADMIN: HCPCS | Performed by: INTERNAL MEDICINE

## 2022-03-07 PROCEDURE — 1036F TOBACCO NON-USER: CPT | Performed by: INTERNAL MEDICINE

## 2022-03-07 PROCEDURE — 99203 OFFICE O/P NEW LOW 30 MIN: CPT | Performed by: INTERNAL MEDICINE

## 2022-03-07 PROCEDURE — G8427 DOCREV CUR MEDS BY ELIG CLIN: HCPCS | Performed by: INTERNAL MEDICINE

## 2022-03-07 NOTE — PROGRESS NOTES
Yes    Weight:  Any change in weight over the past year? No   How about past 5 years? Yes  How much? 20    Other Compliants :Edward complains of decreased memory, decreased concentration as well. Past Medical History:   Diagnosis Date    Anxiety     Impingement syndrome involving patellar fat pad of left knee 2021    Multiple allergies     PCOS (polycystic ovarian syndrome)     Pilonidal cyst        Past Surgical History:   Procedure Laterality Date    CYST INCISION AND DRAINAGE  10/10/2020    pilonidal cyst-- [facility]    MYRINGOTOMY AND TYMPANOSTOMY TUBE PLACEMENT      20mos old- bilateral    PILONIDAL CYST EXCISION N/A 11/24/2020    PILONIDAL CYSTECTOMY performed by Asha Bright MD at [address] [place] [address]  10/23/2017    SEPTOPLASTY N/A 10/23/2017    SEPTOPLASTY, POSSIBLE SUBMUCOUS RESECT TURBINATES PARTIAL LEFT performed by Paris Salazar MD at [facility]  11/20/12    Dr Charlie Salmon       Social History     Tobacco Use    Smoking status: Never Smoker    Smokeless tobacco: Never Used   Vaping Use    Vaping Use: Never used   Substance Use Topics    Alcohol use: No    Drug use: No       Allergies   Allergen Reactions    Dog Epithelium     Dust Mite Extract     Molds & Smuts     Other      Weeds Other reaction(s): Unknown    Seasonal     Tree Extract        Current Outpatient Medications   Medication Sig Dispense Refill    metFORMIN (GLUCOPHAGE-XR) 500 MG extended release tablet Take 500 mg by mouth daily (with breakfast)       ondansetron (ZOFRAN ODT) 4 MG disintegrating tablet Take 1 tablet by mouth every 8 hours as needed for Nausea 20 tablet 1     No current facility-administered medications for this visit.        Family History   Problem Relation Age of Onset    Asthma Mother     Diabetes Maternal Grandfather     Heart Disease Maternal Grandfather     High Blood Pressure Maternal Grandfather     Kidney Disease Maternal Grandfather     Cancer Maternal Grandfather     Arthritis Maternal Grandfather     Stroke Maternal Grandfather     Heart Disease Paternal Grandmother     High Blood Pressure Paternal Grandmother     High Cholesterol Paternal Grandmother     Early Death Paternal Grandmother         Any family history of any sleep problems or any one in your family on CPAP? No    Social History     Tobacco Use    Smoking status: Never Smoker    Smokeless tobacco: Never Used   Vaping Use    Vaping Use: Never used   Substance Use Topics    Alcohol use: No    Drug use: No     Caffeine Intake: How much soda (pop), coffee, tea, power drinks do you ingest per day?0 per day. 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 Dayshift, no second job      Review of Systems:   General/Constitutional: No recent loss of weight or appetite changes. No fever or chills. HENT: Negative. Eyes: Negative. Upper respiratory tract: No nasal stuffiness or post nasal drip. Lower respiratory tract/ lungs: No cough or sputum production. No hemoptysis. Cardiovascular: No palpitations or chest pain. Gastrointestinal: No nausea or vomiting. Neurological: No focal neurologiacal weakness. Extremities: No edema. Musculoskeletal: No complaints. Genitourinary: No complaints. Hematological: Negative. Psychiatric/Behavioral: Negative. Skin: No itching. Physical Exam:    HEIGHTHeight: 5' 4\" (162.6 cm) WEIGHTWeight: 239 lb 9.6 oz (108.7 kg)    BMI:  There is no height or weight on file to calculate BMI.   Neck Size:  16     ESS: 14  SAQLI: 39  Vitals:   Vitals:    03/07/22 1101   BP: 124/80   Site: Left Upper Arm   Position: Sitting   Cuff Size: Medium Adult   Pulse: 84   Temp: 98 °F (36.7 °C)   TempSrc: Temporal   SpO2: 98%  Comment: on RA   Weight: 239 lb 9.6 oz (108.7 kg)   Height: 5' 4\" (1.626 m)          Mallampati Score: 1    Physical Exam :  Constitutional:BMI 42  HENT:   Head: Normocephalic and atraumatic. Mouth/Throat: Oropharynx is clear and moist. No oral thrush. Mallampati 2, redundant pharyngeal tissues. Eyes: Conjunctivae are normal. PERRLA. No scleral icterus. Neck: Neck supple. No JVD present. No tracheal deviation present. 16 inches  Cardiovascular: Normal rate, regular rhythm, normal heart sounds. No murmur heard. Pulmonary/Chest: Effort normal and breath sounds normal. No stridor. No respiratory distress. No wheezes. No rales. Abdominal: Soft. No distension. No tenderness. Musculoskeletal: Normal range of motion. Lymphadenopathy:  No cervical adenopathy. Neurological: Alert and oriented to person, place, and time. No focal deficits. Skin: Skin is warm and dry. Patient is not diaphoretic. Psychiatric: Normal behavior with normal mood and affect. Diagnostic Data:    Assessment      Diagnosis Orders   1. Hypersomnia  Home Sleep Study   2. Sleep related choking sensation  Home Sleep Study   3. Leg edema  Home Sleep Study   4. Excessive sleepiness while driving     5. BMI 40.0-44.9, adult [facility])         Plan   Orders Placed This Encounter   Procedures    Home Sleep Study     Standing Status:   Future     Standing Expiration Date:   3/7/2023     Order Specific Question:   Location For Sleep Study     Answer:   [facility]     Order Specific Question:   Select Sleep Lab Location     Answer:   [address]        Mask Desensitization and Pre study teaching? No  Weight Loss Information Given? Yes  Sleep Hygiene Discussed? Yes    -She was advised to call [employer] regarding supplies if needed.  -She call my office for earlier appointment if needed for worsening of sleep symptoms. -Reynaldo Tabares educated about my impression and plan. Patient verbalizes understanding.

## 2022-03-07 NOTE — PATIENT INSTRUCTIONS
Patient Education        Sleep Apnea: Care Instructions  Overview     Sleep apnea means that you frequently stop breathing for 10 seconds or longer during sleep. It can be mild to severe, based on the number of times an hour that you stop breathing. Blocked or narrowed airways in your nose, mouth, or throat can cause sleep apnea. Your airway can become blocked when your throat muscles and tongue relax during sleep. You can help treat sleep apnea at home by making lifestyle changes. You also can use a CPAP breathing machine that keeps tissues in the throat from blocking your airway. Or your doctor may suggest that you use a breathing device while you sleep. It helps keep your airway open. This could be a device that you put in your mouth. In some cases, surgery may be needed to remove enlarged tissues in the throat. Follow-up care is a key part of your treatment and safety. Be sure to make and go to all appointments, and call your doctor if you are having problems. It's also a good idea to know your test results and keep a list of the medicines you take. How can you care for yourself at home? · Lose weight, if needed. · Sleep on your side. It may help mild apnea. · Avoid alcohol and medicines such as sleeping pills, opioids, or sedatives before bed. · Don't smoke. If you need help quitting, talk to your doctor. · Prop up the head of your bed. · Treat breathing problems, such as a stuffy nose, that are caused by a cold or allergies. · Try a continuous positive airway pressure (CPAP) breathing machine if your doctor recommends it. · If CPAP doesn't work for you, ask your doctor if you can try other masks, settings, or breathing machines. · Try oral breathing devices or other nasal devices. · Talk to your doctor if your nose feels dry or bleeds, or if it gets runny or stuffy when you use a breathing machine. · Tell your doctor if you're sleepy during the day and it affects your daily life.  Don't drive or operate machinery when you're drowsy. When should you call for help? Watch closely for changes in your health, and be sure to contact your doctor if:    · You still have sleep apnea even though you have made lifestyle changes.     · You are thinking of trying a device such as CPAP.     · You are having problems using a CPAP or similar machine.     · You are still sleepy during the day, and it affects your daily life. Where can you learn more? Go to https://BigTwist.SonoPlot. org and sign in to your [employer] account. Enter F830 in the Bacterioscan box to learn more about \"Sleep Apnea: Care Instructions. \"     If you do not have an account, please click on the \"Sign Up Now\" link. Current as of: July 6, 2021               Content Version: 13.1  © 6125-8602 [employer]wise, Incorporated. Care instructions adapted under license by [facility] ([facility]). If you have questions about a medical condition or this instruction, always ask your healthcare professional. [place] [address] any warranty or liability for your use of this information.

## 2022-03-10 ENCOUNTER — HOSPITAL ENCOUNTER (OUTPATIENT)
Dept: SLEEP CENTER | Age: 22
Discharge: HOME | End: 2022-03-12
Payer: COMMERCIAL

## 2022-03-10 DIAGNOSIS — G47.10 HYPERSOMNIA: ICD-10-CM

## 2022-03-10 DIAGNOSIS — R06.89 SLEEP RELATED CHOKING SENSATION: ICD-10-CM

## 2022-03-10 DIAGNOSIS — R60.0 LEG EDEMA: ICD-10-CM

## 2022-03-10 PROCEDURE — 95806 SLEEP STUDY UNATT&RESP EFFT: CPT

## 2022-03-10 NOTE — PROGRESS NOTES
Edward presents today for a HST instruction and demonstration on unit # [id number] [phone] [id number]. Questions were asked and answers given. She was able to return demonstration and verbalized understanding. The sleep center control room phone number was provided incase questions arise during her study. Informed patient to call 911 in case of an emergency. She states she will return the unit tomorrow. Covid screen was negative, temperature WNL.

## 2022-03-13 NOTE — PROGRESS NOTES
[address] [place] [address]                               SLEEP STUDY REPORT    PATIENT NAME: Lilian Chairez                   :        2000  MED REC NO:   [id number]                           ROOM:  ACCOUNT NO:   [de-identified]                           ADMIT DATE: 03/10/2022  PROVIDER:     Santa Escobedo M.D.    Evelin Mulberry:  03/10/2022    HOME SLEEP STUDY REPORT    REFERRING PROVIDER:  The patient herself. HISTORY OF PRESENT ILLNESS:  The patient is a 50-year-old female with  complaints of nonrestorative, fragmented sleep; difficulty waking up;  daytime somnolence. Her weight is 239 pounds, height 64 inches, BMI 41. METHODS: The patient underwent Type III Portable Monitoring Sleep Study  including the simultaneous recording of oral-nasal airflow, rib and  abdominal respiratory effort, pulse rate, oxygen saturation, and body  position. Scoring criteria is consistent with the current published  AASM standards for scoring of apneas and hypopneas 1B. DETAILS OF THE STUDY:  The patient came to the sleep lab and picked up  her HST unit #8391. She was given instructions how to set it up. The  patient returned the unit the next morning. The patient reports getting  up to the rest room twice during the night. Total recording time 525  minutes. Lights off time 09:32 p.m., lights on time 06:18 a.m. RESPIRATORY SUMMARY:  1 central apnea, 2 obstructive hypopneas for a  total MARAL of 0.3. The total number of events was 1 during supine  position and 2 during nonsupine position. PULSE OXIMETRY SUMMARY:  Mean oxygen saturation 97.2%, lowest oxygen  saturation 91%. BODY POSITION SUMMARY:  205 minutes of supine position, 320 minutes of  nonsupine position, 2.7 minutes of upright time. ECG SUMMARY:  Normal sinus rhythm. IMPRESSION:  Negative type 3 polysomnogram for sleep-disordered  breathing.   This study was scored with AASM rules including 3% and 4%  desaturation to score hypopneas. With both methods, the MARAL came below  1 event per hour. RECOMMENDATIONS:  The patient will be followed up in the sleep clinic to  review results and decide how to proceed next. I would recommend an  attended polysomnogram followed by MSLT if the patient continues  complaining of severe daytime somnolence.         Valery Mora M.D.    D: 03/12/2022 15:50:38       T: 03/13/2022 16:03:21     [email or web address]  Job#: [id number]     Doc#: [id number]    CC:

## 2022-03-14 ENCOUNTER — TELEPHONE (OUTPATIENT)
Dept: PULMONOLOGY | Age: 22
End: 2022-03-14

## 2022-03-14 LAB — STATUS: NORMAL

## 2022-03-14 NOTE — TELEPHONE ENCOUNTER
Pt called in wanting to know if home sleep study results were in, I told her they are but she will have to wait it  will be discussed at her appt with clare gonzalez on Thursday 3/17/22

## 2022-03-15 ENCOUNTER — OFFICE VISIT (OUTPATIENT)
Dept: PULMONOLOGY | Age: 22
End: 2022-03-15
Payer: COMMERCIAL

## 2022-03-15 VITALS
HEIGHT: 64 IN | WEIGHT: 239 LBS | SYSTOLIC BLOOD PRESSURE: 122 MMHG | TEMPERATURE: 98.2 F | BODY MASS INDEX: 40.8 KG/M2 | HEART RATE: 91 BPM | OXYGEN SATURATION: 98 % | DIASTOLIC BLOOD PRESSURE: 78 MMHG

## 2022-03-15 DIAGNOSIS — R40.0 EXCESSIVE SLEEPINESS WHILE DRIVING: ICD-10-CM

## 2022-03-15 DIAGNOSIS — R06.89 SLEEP RELATED CHOKING SENSATION: ICD-10-CM

## 2022-03-15 DIAGNOSIS — G47.10 HYPERSOMNIA: Primary | ICD-10-CM

## 2022-03-15 PROCEDURE — 1036F TOBACCO NON-USER: CPT | Performed by: NURSE PRACTITIONER

## 2022-03-15 PROCEDURE — G8484 FLU IMMUNIZE NO ADMIN: HCPCS | Performed by: NURSE PRACTITIONER

## 2022-03-15 PROCEDURE — G8417 CALC BMI ABV UP PARAM F/U: HCPCS | Performed by: NURSE PRACTITIONER

## 2022-03-15 PROCEDURE — 99213 OFFICE O/P EST LOW 20 MIN: CPT | Performed by: NURSE PRACTITIONER

## 2022-03-15 PROCEDURE — G8427 DOCREV CUR MEDS BY ELIG CLIN: HCPCS | Performed by: NURSE PRACTITIONER

## 2022-03-15 RX ORDER — ZOLPIDEM TARTRATE 5 MG/1
5 TABLET ORAL ONCE
Qty: 1 TABLET | Refills: 0 | Status: SHIPPED | OUTPATIENT
Start: 2022-03-15 | End: 2022-03-15

## 2022-03-15 ASSESSMENT — ENCOUNTER SYMPTOMS
WHEEZING: 0
RESPIRATORY NEGATIVE: 1
GASTROINTESTINAL NEGATIVE: 1
COUGH: 0
EYES NEGATIVE: 1
ALLERGIC/IMMUNOLOGIC NEGATIVE: 1
SHORTNESS OF BREATH: 0

## 2022-03-15 NOTE — PROGRESS NOTES
[facility]    Reynaldo Tabares, 22 y.o.  [id number]      Pt of  [facility]   Nurses Notes   [email or web address] is here for a HST follow up   Study Results  Initial Study Date -  03/10/2022  AHI -  0.3    TotalEvents - 3  (Apneas  1  Hypopneas 2  Central  0)  (Total Sleep Time - 525.8 min)  Time with Sats below 88% - 0 min  Neck: 16 inches   Mallampati: 1  ESS: 14  SAQLI: 46    Interval History       Reynaldo Tabares is a 22 y.o. old female who comes in to review the results of her recent sleep study, to answer questions and to explore options for treatment. she continues to have symptoms of excessive daytime sleepiness  Past hx of anxiety   Feels disorganized at times during the day   Alert today   Short term memory issues   Issues falling asleep sometimes take up to 1 hour     Allergies  Allergies   Allergen Reactions    Dog Epithelium     Dust Mite Extract     Molds & Smuts     Other      Weeds Other reaction(s): Unknown    Seasonal     Tree Extract      Meds  Current Outpatient Medications   Medication Sig Dispense Refill    zolpidem (AMBIEN) 5 MG tablet Take 1 tablet by mouth once for 1 dose. 1 tablet 0    ondansetron (ZOFRAN ODT) 4 MG disintegrating tablet Take 1 tablet by mouth every 8 hours as needed for Nausea 20 tablet 1     No current facility-administered medications for this visit. ROS  Review of Systems   Constitutional: Negative. Negative for chills and fever. HENT: Negative. Negative for congestion. Eyes: Negative. Respiratory: Negative. Negative for cough, shortness of breath and wheezing. Cardiovascular: Negative. Negative for chest pain and leg swelling. Gastrointestinal: Negative. Endocrine: Negative. Genitourinary: Negative. Musculoskeletal: Negative. Allergic/Immunologic: Negative. Neurological: Negative. Hematological: Negative. Psychiatric/Behavioral: Positive for sleep disturbance.      Exam  Vitals -  /78 Pulse 91   Temp 98.2 °F (36.8 °C)   Ht 5' 4\" (1.626 m)   Wt 239 lb (108.4 kg)   SpO2 98% Comment: r/a  BMI 41.02 kg/m²    Body mass index is 41.02 kg/m². Oxygen level - Room Air  Physical Exam  Vitals and nursing note reviewed. Constitutional:       Appearance: She is well-developed. She is obese. HENT:      Head: Normocephalic and atraumatic. Eyes:      Conjunctiva/sclera: Conjunctivae normal.      Pupils: Pupils are equal, round, and reactive to light. Neck:      Vascular: No JVD. Cardiovascular:      Rate and Rhythm: Normal rate and regular rhythm. Heart sounds: Normal heart sounds. No murmur heard. No friction rub. No gallop. Pulmonary:      Effort: Pulmonary effort is normal. No respiratory distress. Breath sounds: Normal breath sounds. No wheezing or rales. Abdominal:      General: Bowel sounds are normal.      Palpations: Abdomen is soft. Musculoskeletal:         General: Normal range of motion. Cervical back: Normal range of motion and neck supple. Skin:     General: Skin is warm and dry. Capillary Refill: Capillary refill takes less than 2 seconds. Neurological:      Mental Status: She is alert and oriented to person, place, and time. Psychiatric:         Behavior: Behavior normal.         Thought Content: Thought content normal.         Judgment: Judgment normal.        Assessment   Diagnosis Orders   1. Hypersomnia  Baseline Diagnostic Sleep Study    zolpidem (AMBIEN) 5 MG tablet   2. Sleep related choking sensation  Baseline Diagnostic Sleep Study   3. Excessive sleepiness while driving  Baseline Diagnostic Sleep Study    Assessment of Daytime Sleepiness    Urine Drug Screen   4.  BMI 40.0-44.9, adult [facility])  Baseline Diagnostic Sleep Study      Recommendations    -HST reviewed with patient recommended in lab PSG due to excessive sleepiness  -PSG in lab with MSLT to follow  -Urine drug screen   -Weight loss encouraged   -Ambien 5mg PO one time dose for night of PSG study   -RTC 1 week after testing completed     Electronically signed by Darryl Favre, APRN - CNP on 3/15/2022 at 3:13 PM

## 2022-03-16 ENCOUNTER — TELEPHONE (OUTPATIENT)
Dept: SLEEP CENTER | Age: 22
End: 2022-03-16

## 2022-03-16 NOTE — TELEPHONE ENCOUNTER
Paula Claudio, I was reviewing Runge's chart and noticed you ordered Ambien the night of her PSG/MSLT study, a sleep aid is contraindicated for the MSLT portion of the study and may alter the results. Per AASM guidelines, a sleep history 2 weeks prior to testing is also recommended and should include 6-7 hours of sleep each night with good sleep hygiene. Please advise. Thank you!   Dar Cardenas.

## 2022-03-17 ENCOUNTER — TELEPHONE (OUTPATIENT)
Dept: SLEEP CENTER | Age: 22
End: 2022-03-17

## 2022-03-17 NOTE — TELEPHONE ENCOUNTER
No problem Brianda Sinha! If you have any questions, please do not hesitate to ask. I appreciate it.   Dipika Bustillos.

## 2022-04-10 ENCOUNTER — HOSPITAL ENCOUNTER (OUTPATIENT)
Dept: SLEEP CENTER | Age: 22
Discharge: HOME | End: 2022-04-12
Payer: COMMERCIAL

## 2022-04-10 DIAGNOSIS — R06.89 SLEEP RELATED CHOKING SENSATION: ICD-10-CM

## 2022-04-10 DIAGNOSIS — R40.0 EXCESSIVE SLEEPINESS WHILE DRIVING: ICD-10-CM

## 2022-04-10 DIAGNOSIS — G47.10 HYPERSOMNIA: ICD-10-CM

## 2022-04-10 PROCEDURE — 95810 POLYSOM 6/> YRS 4/> PARAM: CPT

## 2022-04-11 LAB — STATUS: NORMAL

## 2022-04-18 ENCOUNTER — OFFICE VISIT (OUTPATIENT)
Dept: PULMONOLOGY | Age: 22
End: 2022-04-18
Payer: COMMERCIAL

## 2022-04-18 VITALS
SYSTOLIC BLOOD PRESSURE: 124 MMHG | HEART RATE: 93 BPM | OXYGEN SATURATION: 98 % | DIASTOLIC BLOOD PRESSURE: 84 MMHG | HEIGHT: 64 IN | BODY MASS INDEX: 40.63 KG/M2 | WEIGHT: 238 LBS | TEMPERATURE: 98.1 F

## 2022-04-18 DIAGNOSIS — G47.09 INITIAL INSOMNIA: Primary | ICD-10-CM

## 2022-04-18 PROCEDURE — 1036F TOBACCO NON-USER: CPT | Performed by: INTERNAL MEDICINE

## 2022-04-18 PROCEDURE — 99213 OFFICE O/P EST LOW 20 MIN: CPT | Performed by: INTERNAL MEDICINE

## 2022-04-18 PROCEDURE — G8427 DOCREV CUR MEDS BY ELIG CLIN: HCPCS | Performed by: INTERNAL MEDICINE

## 2022-04-18 PROCEDURE — G8417 CALC BMI ABV UP PARAM F/U: HCPCS | Performed by: INTERNAL MEDICINE

## 2022-04-18 RX ORDER — ZOLPIDEM TARTRATE 5 MG/1
5 TABLET ORAL NIGHTLY PRN
Qty: 30 TABLET | Refills: 1 | Status: SHIPPED | OUTPATIENT
Start: 2022-04-18 | End: 2022-05-02

## 2022-04-18 NOTE — PROGRESS NOTES
Sleep Medicine    Thomas Roche, 22 y.o.  [id number]    Nurses Notes   This pt is here for PSG f/u   Study Results    Initial Study Date -  4/10/22  AHI -  0.4    Total Events - 2  (Apneas  0  Hypopneas 2  Central  0)  LM w/Arousals - 0  Sleep Efficiency - 68.9 % (Total Sleep Time - 340.6 min)  Time with Sats below 88% - 0.0 min  Oxygen level - Room Air  96.3  SAQLI: 29  ESS: 16  N: 16  MP: 1    INTERVAL HISTORY         Thomas Roche is a 22 y.o. old female who comes in to review the results of her recent sleep study, to answer questions and to explore options for treatment. Good quality study revealing fragmented sleep due to spontaneous arousals, 127 min of WASO, sleep latency 26 min. Goes to bed at 9:30 pm it takes [place] a long time to fall sleep., boyfriend wakes up with alarm clock at 2:30 snoozing several times and disrupting her sleep.     PMH  Past Medical History:   Diagnosis Date    Anxiety     Impingement syndrome involving patellar fat pad of left knee 2021    Multiple allergies     PCOS (polycystic ovarian syndrome)     Pilonidal cyst      Past Surgical History:   Procedure Laterality Date    CYST INCISION AND DRAINAGE  10/10/2020    pilonidal cyst-- [facility]    MYRINGOTOMY AND TYMPANOSTOMY TUBE [de-identified]      20mos old- bilateral    PILONIDAL CYST EXCISION N/A 11/24/2020    PILONIDAL CYSTECTOMY performed by Trisha Acosta MD at [address] [place] [address] SEPTOPLASTY  10/23/2017    SEPTOPLASTY N/A 10/23/2017    SEPTOPLASTY, POSSIBLE SUBMUCOUS RESECT TURBINATES PARTIAL LEFT performed by Shabana Oliver MD at [facility]  11/20/12    Dr Goldy Bull     Social History     Tobacco Use    Smoking status: Never Smoker    Smokeless tobacco: Never Used   Vaping Use    Vaping Use: Never used   Substance Use Topics    Alcohol use: No    Drug use: No     Family History   Problem Relation Age of Onset    Asthma Mother     Diabetes Maternal Grandfather     Heart Disease Maternal Grandfather     High Blood Pressure Maternal Grandfather     Kidney Disease Maternal Grandfather     Cancer Maternal Grandfather     Arthritis Maternal Grandfather     Stroke Maternal Grandfather     Heart Disease Paternal Grandmother     High Blood Pressure Paternal Grandmother     High Cholesterol Paternal Grandmother     Early Death Paternal Grandmother        ALLERGIES  Allergies   Allergen Reactions    Dog Epithelium     Dust Mite Extract     Molds & Smuts     Other      Weeds Other reaction(s): Unknown    Seasonal     Tree Extract        MEDS  Current Outpatient Medications   Medication Sig Dispense Refill    ondansetron (ZOFRAN ODT) 4 MG disintegrating tablet Take 1 tablet by mouth every 8 hours as needed for Nausea 20 tablet 1     No current facility-administered medications for this visit. EXAM  Vitals -  /84 (Site: Right Upper Arm, Position: Sitting, Cuff Size: Medium Adult)   Pulse 93   Temp 98.1 °F (36.7 °C) (Temporal)   Ht 5' 4\" (1.626 m)   Wt 238 lb (108 kg)   SpO2 98% Comment: ON RA  BMI 40.85 kg/m²    Body mass index is 40.85 kg/m². Constitutional - No distress. Patient is oriented to person, place, and time. Mouth/Throat - Oropharynx is clear and moist.   Neck - Neck supple. No JVD present. No tracheal deviation present. Psychiatric - Patient  has a normal mood and affect. ASSESSMENT    Initial insomnia, sleep environment not conductive to adequate sleep due to disruptions. No evidence of SDB by HST or PSG  Did not meet minimum amount of sleep (360 min) the night before the MSLT therefore MSLT not completed  [address] mother had narcolepsy    RECOMMENDATIONS    Sleep hygiene reviewed     Orders Placed This Encounter   Medications    zolpidem (AMBIEN) 5 MG tablet     Sig: Take 1 tablet by mouth nightly as needed for Sleep for up to 30 days.      Dispense:  30 tablet     Refill:  1      Will add short acting hypnotic to help with sleep onset, boyfriend will change sleeping habits  Precautions to take when using Zolpidem discussed      FOLLOW UP   RTC 3 mos

## 2022-04-18 NOTE — PROGRESS NOTES
Mikeia is undecided on HME provider.   Roya
wake after sleep onset 127 minutes, latency to  minutes. SLEEP STAGIN.6 minutes or 18.1% of total sleep time in N1 sleep,  198 minutes or 58.3% in N2 sleep, 48.5 minutes or 14.2% in N3 sleep, 32  minutes or 9.4% in REM sleep. RESPIRATORY SUMMARY:  2 obstructive hypopneas for an AHI of 0.4. The  RDI was 0.4. The REM AHI was 3.8, and the supine AHI was 1. BODY POSITION SUMMARY:  119.6 minutes supine sleep, 115.5 minutes prone  sleep, 105.5 minutes left lateral sleep. SLEEP DISTURBANCE SUMMARY:  There were 51 arousals for an arousal index  of 9. All of them were spontaneous in nature and not related to  respiratory events or abnormal limb movements. PERIODIC LIMB MOVEMENT SUMMARY:  There was none. PULSE OXIMETRY SUMMARY:  Mean oxygen saturation during wakefulness  96.8%, during sleep 96.3%, lowest oxygen saturation 91%. ECG SUMMARY:  Normal sinus rhythm. ASSESSMENT:  Negative study for sleep disordered breathing. Frequent  and spontaneous arousals with a total number of 51 for an arousal index  of 9. These arousals were spontaneous and not caused by respiratory  events or periodic limb movements. Multiple sleep latency test not  completed as the total sleep time was 340.6 minutes which is less than  the minimum required of 360 minutes of sleep the night before an MSLT  can be completed according to guidelines. Mildly increased sleep  latency and decreased sleep efficiency of 68.9%. Soft nonobstructive  snoring recorded. RECOMMENDATIONS:  The patient will be followed up in the sleep clinic to  review results.         Casi Muir M.D.    D: 2022 13:55:10       T: 2022 12:08:32     [email or web address]  Job#: [id number]     Doc#: [id number]    CC:

## 2022-04-29 ENCOUNTER — TELEPHONE (OUTPATIENT)
Dept: PULMONOLOGY | Age: 22
End: 2022-04-29

## 2022-04-29 NOTE — TELEPHONE ENCOUNTER
Patient is stating that Ambien is helping her stay asleep but she is still having a hard time falling asleep. How do you feel about Ambien CR for this Patient? Please advise.

## 2022-05-02 DIAGNOSIS — G47.09 OTHER INSOMNIA: Primary | ICD-10-CM

## 2022-05-02 RX ORDER — ZOLPIDEM TARTRATE 6.25 MG/1
6.25 TABLET, FILM COATED, EXTENDED RELEASE ORAL NIGHTLY PRN
Qty: 30 TABLET | Refills: 2 | Status: SHIPPED | OUTPATIENT
Start: 2022-05-02 | End: 2022-06-01

## 2022-05-02 NOTE — TELEPHONE ENCOUNTER
Can you put this order in please. Patient tried to  on Friday and they didint have an rx on file for her. Please advise.

## 2022-05-05 ENCOUNTER — TELEPHONE (OUTPATIENT)
Dept: PULMONOLOGY | Age: 22
End: 2022-05-05

## 2022-05-05 NOTE — TELEPHONE ENCOUNTER
Received denial for Zolpidem ER. Clinical Info sent back to [place], with documentation that patient has tried Zolpidem >14 days and failed.

## 2022-05-06 NOTE — TELEPHONE ENCOUNTER
PA Denied, phoned and notified patient. Patient reported that she can get medication for $19 with [employer] card.

## 2022-06-16 ENCOUNTER — OFFICE VISIT (OUTPATIENT)
Dept: PULMONOLOGY | Age: 22
End: 2022-06-16
Payer: COMMERCIAL

## 2022-06-16 VITALS
TEMPERATURE: 97 F | HEIGHT: 64 IN | BODY MASS INDEX: 40.63 KG/M2 | DIASTOLIC BLOOD PRESSURE: 68 MMHG | OXYGEN SATURATION: 98 % | SYSTOLIC BLOOD PRESSURE: 128 MMHG | WEIGHT: 238 LBS | HEART RATE: 91 BPM

## 2022-06-16 DIAGNOSIS — F41.1 GENERALIZED ANXIETY DISORDER: ICD-10-CM

## 2022-06-16 DIAGNOSIS — G47.10 HYPERSOMNIA: ICD-10-CM

## 2022-06-16 DIAGNOSIS — G47.09 OTHER INSOMNIA: Primary | ICD-10-CM

## 2022-06-16 DIAGNOSIS — R53.83 FATIGUE, UNSPECIFIED TYPE: ICD-10-CM

## 2022-06-16 DIAGNOSIS — R40.0 EXCESSIVE SLEEPINESS WHILE DRIVING: ICD-10-CM

## 2022-06-16 PROCEDURE — 99214 OFFICE O/P EST MOD 30 MIN: CPT | Performed by: NURSE PRACTITIONER

## 2022-06-16 PROCEDURE — G8427 DOCREV CUR MEDS BY ELIG CLIN: HCPCS | Performed by: NURSE PRACTITIONER

## 2022-06-16 PROCEDURE — 1036F TOBACCO NON-USER: CPT | Performed by: NURSE PRACTITIONER

## 2022-06-16 PROCEDURE — G8417 CALC BMI ABV UP PARAM F/U: HCPCS | Performed by: NURSE PRACTITIONER

## 2022-06-16 RX ORDER — BUSPIRONE HYDROCHLORIDE 5 MG/1
5 TABLET ORAL 3 TIMES DAILY PRN
Qty: 60 TABLET | Refills: 0 | Status: SHIPPED | OUTPATIENT
Start: 2022-06-16 | End: 2022-07-16

## 2022-06-16 RX ORDER — DOXEPIN HYDROCHLORIDE 25 MG/1
25 CAPSULE ORAL NIGHTLY
Qty: 30 CAPSULE | Refills: 2 | Status: SHIPPED | OUTPATIENT
Start: 2022-06-16 | End: 2022-09-29

## 2022-06-16 RX ORDER — ZOLPIDEM TARTRATE 5 MG/1
5 TABLET ORAL NIGHTLY PRN
COMMUNITY
End: 2022-06-16

## 2022-06-16 NOTE — PROGRESS NOTES
[facility]    Bud Gale, 25 y.o.  [id number]      Progress Note  Eleonora Friend comes in for follow up regarding her    Diagnosis Orders   1. Other insomnia  Digital Therapy (SOMRYST) MISC    doxepin (SINEQUAN) 25 MG capsule   2. Hypersomnia  Digital Therapy (SOMRYST) MISC   3. Excessive sleepiness while driving  Digital Therapy (SOMRYST) MISC   4. Generalized anxiety disorder     5. Fatigue, unspecified type     She is taking her medications with good compliance. She is also practicing good sleep hygiene. Goes to sleep exact same time ( 9:30 pm) every night. Dark room, no TV, uses humidier for comfort and light noise. Very restless while trying to go to sleep. Gets \" energy joints\" right when trying to go to sleep. Takes at least 1 hour to fall asleep   Waking up average 3x per night for unknown reasons,  Trouble falling back asleep 30 -1 hr.   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 Her alarm is set for 7 am.  She has no problem getting up to her alarm  Is excessively tired all day, has sleepiness while driving  Rarely drinks caffeine. When she does drink caffeine \" it does nothing\"   Underwent HST, showed AHI 0.0 . Then went for in lab PSG , poor sleep efficacy . Neg for DIANE  Has been taking Ambien 5 mg PO nightly- has not seen any benefit with this. ER tablets declined by her insurance     Reports being a very anxious person.  Has tried Prozac with no benefit in past   Has terrible anxiety on Sunday's thinking about the next week, sleeps worse on Sundays and gets \"the shakes\"     Past Medical History:   Diagnosis Date    Anxiety     Impingement syndrome involving patellar fat pad of left knee 2021    Multiple allergies     PCOS (polycystic ovarian syndrome)     Pilonidal cyst      Past Surgical History:   Procedure Laterality Date    CYST INCISION AND DRAINAGE  10/10/2020    [facility]    MYRINGOTOMY AND TYMPANOSTOMY TUBE PLACEMENT      20mos old- bilateral    PILONIDAL CYST EXCISION N/A 11/24/2020    PILONIDAL CYSTECTOMY performed by Laura Ball MD at [address] [place] [address]  10/23/2017    SEPTOPLASTY N/A 10/23/2017    SEPTOPLASTY, POSSIBLE SUBMUCOUS RESECT TURBINATES PARTIAL LEFT performed by Geovany Callaway MD at [facility]  11/20/12    Dr Ladi Dong     Social History     Tobacco Use    Smoking status: Never Smoker    Smokeless tobacco: Never Used   Vaping Use    Vaping Use: Never used   Substance Use Topics    Alcohol use: No    Drug use: No     Allergies   Allergen Reactions    Dog Epithelium     Dust Mite Extract     Molds & Smuts     Other      Weeds Other reaction(s): Unknown    Seasonal     Tree Extract      Current Outpatient Medications   Medication Sig Dispense Refill    Digital Therapy (SOMRYST) [facility] Please enroll patient in digital [facility] CBT for chronic insomnia 1 each 0    doxepin (SINEQUAN) 25 MG capsule Take 1 capsule by mouth nightly 30 capsule 2    busPIRone (BUSPAR) 5 MG tablet Take 1 tablet by mouth 3 times daily as needed (anxiety) 60 tablet 0    ondansetron (ZOFRAN ODT) 4 MG disintegrating tablet Take 1 tablet by mouth every 8 hours as needed for Nausea 20 tablet 1     No current facility-administered medications for this visit.      Family History   Problem Relation Age of Onset    Asthma Mother     Diabetes Maternal Grandfather     Heart Disease Maternal Grandfather     High Blood Pressure Maternal Grandfather     Kidney Disease Maternal Grandfather     Cancer Maternal Grandfather     Arthritis Maternal Grandfather     Stroke Maternal Grandfather     Heart Disease Paternal Grandmother     High Blood Pressure Paternal Grandmother     High Cholesterol Paternal Grandmother     Early Death Paternal Grandmother       Exam  /68 (Site: Right Upper Arm, Position: Sitting, Cuff Size: Large Adult)   Pulse 91 Temp 97 °F (36.1 °C)   Ht 5' 4\" (1.626 m)   Wt 238 lb (108 kg)   SpO2 98% Comment: on /ra  BMI 40.85 kg/m²   Body mass index is 40.85 kg/m². SAQLI 31   ESS - 13  Constitutional  No distress. Head Normocephalic and atraumatic. Mouth/Throat Oropharynx is clear and moist.   Cardiovascular Normal rate, regular rhythm, normal heart sounds. No murmur heard. Pulmonary/Chest  Effort normal and breath sounds normal. No stridor. No respiratory distress. No wheezes. No rales. Neurological  No tremors    Data  None    Assessment/Plan  The problem of recurrent insomnia is discussed. Avoidance of caffeine sources is strongly encouraged. Sleep hygiene issues are reviewed. The use of sedative hypnotics for temporary relief is appropriate; we discussed the addictive nature of these drugs, and a one-time only prescription for prn use of a hypnotic is given, to use no more than 3 times per week for 2-3 weeks. Stop taking Ambien due to ineffectiveness  Start Doxepin 25 mg PO nightly before bed , side effects discussed, given PI sheet about the drug with AVS  For Anxiety Management start Buspar 5 mg PRN TID , discussed non pharmacologic stress relief options such as use of essential oils, YOGA, meditation, healthy eating habits and exercise  Rx for [facility] Digital CBT , pt instructed on how to download the BIJAN on her phone, she did this while in office.  Will await text message from [employer] to get started     F/u in 6 weeks   -billing based on medical decision making-   Electronically signed by LASHAWN Bravo CNP on 6/16/2022 at 8:55 AM

## 2022-06-16 NOTE — PATIENT INSTRUCTIONS
Patient Education        doxepin (tablets)  Pronunciation: DOX e pin  Brand: Silenor  What is the most important information I should know about doxepin? You should not use this medicine if you have untreated narrow-angle glaucoma orsevere problems with urination. Do not use this medicine if you have used an MAO inhibitor in the past 14 days, such as isocarboxazid, linezolid, methylene blue injection, phenelzine,rasagiline, selegiline, or tranylcypromine. Some people using this medicine have engaged in activity such as driving, eating, or making phone calls and later having no memory of the activity. If this happens to you, stop taking doxepin and talk with your doctor. What is doxepin? Doxepin is a tricyclic antidepressant. Doxepin capsules and oral concentrate (liquid) are used to treat symptoms of depression and/or anxiety associated withalcoholism, manic depression, or other mental illness. Doxepin tablets (Silenor) are used to treat insomnia in people who have trouble staying asleep. This medication guide provides only information about doxepin tablets for insomnia. Doxepin tablets are not for use in treating depression. Doxepin may also be used for purposes not listed in this medication guide. What should I discuss with my healthcare provider before taking doxepin? You should not use this medicine if you are allergic to doxepin, amoxapine, orloxapine, or if you have:   untreated narrow-angle glaucoma; or   severe urination problems. Do not use doxepin if you have used an MAO inhibitor in the past 14 days. A dangerous drug interaction could occur. MAO inhibitors include isocarboxazid, linezolid, methylene blue injection, phenelzine, rasagiline,selegiline, tranylcypromine, and others. Tell your doctor if you have used an \"SSRI\" antidepressant in the past 5 weeks, such as citalopram, escitalopram, fluoxetine (Prozac), fluvoxamine,paroxetine, sertraline (Zoloft), trazodone, or vilazodone.   Tell your doctor if you have ever had:   sleep apnea (breathing stops during sleep);   depression, mental illness, or addiction to drugs or alcohol;   kidney or liver disease;   glaucoma; or   urination problems. Ask your doctor about taking this medicine if you are pregnant. Taking an antidepressant during late pregnancy may cause medical problems in the baby. However, you may have a relapse of depression if you stop taking your antidepressant. Tell your doctor right away if you become pregnant. Do not start or stop taking this medicine without your doctor's advice. This medicine may affect fertility (your ability to have children), whether you are a man or a woman. You should not breastfeed while using Silenor. [place] is not approved for use by anyone younger than 25years old. How should I take doxepin? Follow all directions on your prescription label and read all medication guidesor instruction sheets. Use the medicine exactly as directed. Take this medicine within 30 minutes before bedtime. Do not take within 3 hoursafter eating. It may take 7 to 10 days before your insomnia symptoms improve. Keep using as directed and call your doctor if your symptoms do not improve after 10 days oftreatment. Store at room temperature away from moisture, heat, and light. Keep the bottletightly closed when not in use. What happens if I miss a dose? Take the medicine as soon as you can, but skip the missed dose if it is almost time for your next dose. Do not take two doses at one time. Do not take Silenor if you do not have time for a full night's sleep before being active again. What happens if I overdose? Seek emergency medical attention or call the Poison Help line at 1-765.390.6260. An overdose of doxepin can be fatal.  What should I avoid while taking doxepin? Do not drink alcohol. Dangerous side effects or death could occur.   Avoid taking Silenor within 3 hours after eating a meal.  Avoid driving or hazardous activity until you know how this medicine will affect you. Dizziness or drowsiness can cause falls, accidents, or severeinjuries. What are the possible side effects of doxepin? Get emergency medical help if you have signs of an allergic reaction: hives; difficulty breathing; swelling of your face, lips, tongue, or throat. Some people using this medicine have engaged in activity while not fully awake and later had no memory of it. This may include walking, driving, or making phone calls. If this happens toyou, call your doctor right away. Call your doctor at once if you have:   unusual thoughts or behavior;   confusion, hallucinations;   depressed mood, thoughts about hurting yourself;   uncontrolled muscle movements;   a seizure;   a light-headed feeling, like you might pass out;   blisters or ulcers in your mouth, red or swollen gums, trouble swallowing;   eye pain or redness;   chest pain;   low red blood cells (anemia) --pale skin, unusual tiredness, feeling light-headed or short of breath, cold hands and feet; or   signs of infection --flu-like symptoms, swelling, skin redness, diarrhea, skin sores or white patches, trouble breathing, pain or burning when you urinate. Common side effects may include:   drowsiness, tiredness;   nausea; or   cold symptoms such as stuffy nose, sneezing, sore throat. This is not a complete list of side effects and others may occur. Call your doctor for medical advice about side effects. You may report side effects toFDA at 2-339-JFR-9017. What other drugs will affect doxepin? Using Silenor with other drugs that make you drowsy can worsen this effect. Ask your doctor before using opioid medication, a sleeping pill, a muscle relaxer,or medicine for anxiety or seizures. Tell your doctor about all your other medicines, especially:   cimetidine (Tagamet); or   tolazamide (Tolinase). This list is not complete.  Other drugs may affect Silenor, including prescription and over-the-counter medicines, vitamins, and herbal products. Notall possible drug interactions are listed here. Where can I get more information? Your pharmacist can provide more information about doxepin (tablets). Remember, keep this and all other medicines out of the reach of children, never share your medicines with others, and use this medication only for the indication prescribed. Every effort has been made to ensure that the information provided by [address] [place], [place] [address]can [address] is accurate, up-to-date, and complete, but no guarantee is made to that effect. Drug information contained herein may be time sensitive. [facility] information has been compiled for use by healthcare practitioners and consumers in the [facility] and therefore [facility] does not warrant that uses outside of the [facility] are appropriate, unless specifically indicated otherwise. [facility]'s drug information does not endorse drugs, diagnose patients or recommend therapy. [facility] drug information is an informational resource designed to assist licensed healthcare practitioners in caring for their patients and/or to serve consumers viewing this service as a supplement to, and not a substitute for, the expertise, skill, knowledge and judgment of healthcare practitioners. The absence of a warning for a given drug or drug combination in no way should be construed to indicate that the drug or drug combination is safe, effective or appropriate for any given patient. [facility] does not assume any responsibility for any aspect of healthcare administered with the aid of information [facility] provides. The information contained herein is not intended to cover all possible uses, directions, precautions, warnings, drug interactions, allergic reactions, or adverse effects. If you have questions about the drugs you are taking, check with yourdoctor, nurse or pharmacist.  Copyright 6313-7508 [address]. Version: 5.01.  Revision date: 1/21/2021. Care instructions adapted under license by [facility] [place] ([facility]). If you have questions about a medical condition or this instruction, always ask your healthcare professional. [address] any warranty or liability for your use of this information.

## 2022-07-04 ENCOUNTER — PATIENT MESSAGE (OUTPATIENT)
Dept: FAMILY MEDICINE CLINIC | Age: 22
End: 2022-07-04

## 2022-07-05 ENCOUNTER — OFFICE VISIT (OUTPATIENT)
Dept: FAMILY MEDICINE CLINIC | Age: 22
End: 2022-07-05
Payer: COMMERCIAL

## 2022-07-05 VITALS
SYSTOLIC BLOOD PRESSURE: 120 MMHG | OXYGEN SATURATION: 98 % | HEART RATE: 101 BPM | DIASTOLIC BLOOD PRESSURE: 80 MMHG | TEMPERATURE: 97 F | HEIGHT: 64 IN | RESPIRATION RATE: 18 BRPM | BODY MASS INDEX: 41.21 KG/M2 | WEIGHT: 241.4 LBS

## 2022-07-05 DIAGNOSIS — R11.0 NAUSEA: ICD-10-CM

## 2022-07-05 DIAGNOSIS — E88.81 INSULIN RESISTANCE: ICD-10-CM

## 2022-07-05 DIAGNOSIS — E66.01 CLASS 3 SEVERE OBESITY WITH BODY MASS INDEX (BMI) OF 40.0 TO 44.9 IN ADULT, UNSPECIFIED OBESITY TYPE, UNSPECIFIED WHETHER SERIOUS COMORBIDITY PRESENT (HCC): Primary | ICD-10-CM

## 2022-07-05 DIAGNOSIS — F32.A ANXIETY AND DEPRESSION: ICD-10-CM

## 2022-07-05 DIAGNOSIS — F41.9 ANXIETY AND DEPRESSION: ICD-10-CM

## 2022-07-05 PROCEDURE — G8427 DOCREV CUR MEDS BY ELIG CLIN: HCPCS

## 2022-07-05 PROCEDURE — 1036F TOBACCO NON-USER: CPT

## 2022-07-05 PROCEDURE — 99214 OFFICE O/P EST MOD 30 MIN: CPT

## 2022-07-05 PROCEDURE — G8417 CALC BMI ABV UP PARAM F/U: HCPCS

## 2022-07-05 RX ORDER — ONDANSETRON 4 MG/1
4 TABLET, ORALLY DISINTEGRATING ORAL EVERY 8 HOURS PRN
Qty: 20 TABLET | Refills: 1 | Status: SHIPPED | OUTPATIENT
Start: 2022-07-05 | End: 2022-09-29 | Stop reason: SDUPTHER

## 2022-07-05 RX ORDER — BUPROPION HYDROCHLORIDE 150 MG/1
150 TABLET ORAL EVERY MORNING
Qty: 30 TABLET | Refills: 0 | Status: SHIPPED | OUTPATIENT
Start: 2022-07-05 | End: 2022-07-19

## 2022-07-05 ASSESSMENT — ENCOUNTER SYMPTOMS
CONSTIPATION: 0
WHEEZING: 0
SHORTNESS OF BREATH: 0
BLOOD IN STOOL: 0
SINUS PRESSURE: 0
VOMITING: 0
RHINORRHEA: 0
ABDOMINAL PAIN: 0
COUGH: 0
NAUSEA: 0
DIARRHEA: 0
VOICE CHANGE: 0
SORE THROAT: 0
TROUBLE SWALLOWING: 0
PHOTOPHOBIA: 0
SINUS PAIN: 0

## 2022-07-05 ASSESSMENT — PATIENT HEALTH QUESTIONNAIRE - PHQ9
9. THOUGHTS THAT YOU WOULD BE BETTER OFF DEAD, OR OF HURTING YOURSELF: 0
SUM OF ALL RESPONSES TO PHQ QUESTIONS 1-9: 19
SUM OF ALL RESPONSES TO PHQ QUESTIONS 1-9: 19
7. TROUBLE CONCENTRATING ON THINGS, SUCH AS READING THE NEWSPAPER OR WATCHING TELEVISION: 1
3. TROUBLE FALLING OR STAYING ASLEEP: 3
5. POOR APPETITE OR OVEREATING: 2
1. LITTLE INTEREST OR PLEASURE IN DOING THINGS: 3
10. IF YOU CHECKED OFF ANY PROBLEMS, HOW DIFFICULT HAVE THESE PROBLEMS MADE IT FOR YOU TO DO YOUR WORK, TAKE CARE OF THINGS AT HOME, OR GET ALONG WITH OTHER PEOPLE: 1
SUM OF ALL RESPONSES TO PHQ9 QUESTIONS 1 & 2: 4
4. FEELING TIRED OR HAVING LITTLE ENERGY: 3
8. MOVING OR SPEAKING SO SLOWLY THAT OTHER PEOPLE COULD HAVE NOTICED. OR THE OPPOSITE, BEING SO FIGETY OR RESTLESS THAT YOU HAVE BEEN MOVING AROUND A LOT MORE THAN USUAL: 3
6. FEELING BAD ABOUT YOURSELF - OR THAT YOU ARE A FAILURE OR HAVE LET YOURSELF OR YOUR FAMILY DOWN: 3
2. FEELING DOWN, DEPRESSED OR HOPELESS: 1
SUM OF ALL RESPONSES TO PHQ QUESTIONS 1-9: 19
SUM OF ALL RESPONSES TO PHQ QUESTIONS 1-9: 19

## 2022-07-05 NOTE — PROGRESS NOTES
S: 25 y.o. female with   Chief Complaint   Patient presents with    Weight Loss       HPI: please see resident note for HPI and ROS. BP Readings from Last 3 Encounters:   07/05/22 120/80   06/16/22 128/68   04/18/22 124/84     Wt Readings from Last 3 Encounters:   07/05/22 241 lb 6.4 oz (109.5 kg)   06/16/22 238 lb (108 kg)   04/18/22 238 lb (108 kg)       O: VS:  height is 5' 4\" (1.626 m) and weight is 241 lb 6.4 oz (109.5 kg). Her skin temperature is 97 °F (36.1 °C). Her blood pressure is 120/80 and her pulse is 101 (abnormal). Her respiration is 18 and oxygen saturation is 98%. AAO/NAD, appropriate affect for mood  CV:  RRR, no murmur  Resp: CTAB       Diagnosis Orders   1. Class 3 severe obesity with body mass index (BMI) of 40.0 to 44.9 in adult, unspecified obesity type, unspecified whether serious comorbidity present (HCC)  Lipid, Fasting    buPROPion (WELLBUTRIN XL) 150 MG extended release tablet   2. Nausea  ondansetron (ZOFRAN ODT) 4 MG disintegrating tablet   3. Insulin resistance  Lipid, Fasting   4. Anxiety and depression  buPROPion (WELLBUTRIN XL) 150 MG extended release tablet       Plan:  Please refer to resident note for full plan.  Continue encouraging exercise and dietary changes   Refill Zofran   Start Wellbutrin for anxiety/weight loss.  Will check lipids   Patient to log dietary intake for us to review in 2 weeks. Return in about 2 weeks (around 7/19/2022). Health Maintenance Due   Topic Date Due    HIV screen  Never done    Chlamydia screen  Never done    Hepatitis C screen  Never done    Depression Monitoring  02/26/2022    Pap smear  Never done    COVID-19 Vaccine (3 - Booster for Monique April series) 06/03/2022     I have discussed the case, including pertinent history and exam findings with the resident and attending physician. I agree with the documented assessment and plan as documented by the resident.       John Damian, DO 7/5/2022 3:09 PM

## 2022-07-05 NOTE — PROGRESS NOTES
Rebecca Pedroza (:  2000) is a 25 y.o. female,Established patient, here for evaluation of the following chief complaint(s):  Weight Loss         ASSESSMENT/PLAN:  1. Class 3 severe obesity with body mass index (BMI) of 40.0 to 44.9 in adult, unspecified obesity type, unspecified whether serious comorbidity present (HCC)  -     Lipid, Fasting; Future  -     buPROPion (WELLBUTRIN XL) 150 MG extended release tablet; Take 1 tablet by mouth every morning, Disp-30 tablet, R-0Normal  2. Nausea  -     ondansetron (ZOFRAN ODT) 4 MG disintegrating tablet; Take 1 tablet by mouth every 8 hours as needed for Nausea, Disp-20 tablet, R-1Normal  3. Insulin resistance  -     Lipid, Fasting; Future  4. Anxiety and depression  -     buPROPion (WELLBUTRIN XL) 150 MG extended release tablet; Take 1 tablet by mouth every morning, Disp-30 tablet, R-0Normal    Start bupropion for weight loss and mood  Exercise > 150 mins per week. Caloric restriction and food journal  Healthy weight loss discussed 1-2 lbs per week  Pcos with OB-gyn, trying to conceive. Can consider restart metformin 500 QD if weight not improved at next visit. Return in about 2 weeks (around 2022). Subjective   SUBJECTIVE/OBJECTIVE:  HPI  Pt is a 26 y/o female with PMH PCOS, ANGELICA, insomnia presenting for weight loss advice, pt amicable to starting wellbutrin for weight and noted mood 2/2 work and insomnia. Pt seeing ob-gyn for PCOS, working on [employer], on caloric restriction, exercising 180 mins per week. Weight previously not improved despite efforts, including metforming 1000 BID. Otherwise no f/c n/v/d cp, sob per ros. Review of Systems   Constitutional: Negative for appetite change, chills, diaphoresis, fatigue, fever and unexpected weight change. HENT: Negative for congestion, postnasal drip, rhinorrhea, sinus pressure, sinus pain, sore throat, trouble swallowing and voice change.     Eyes: Negative for photophobia and Abdomen is soft. Tenderness: There is no abdominal tenderness. There is no guarding. Musculoskeletal:         General: No swelling, tenderness or signs of injury. Skin:     General: Skin is warm and dry. Capillary Refill: Capillary refill takes less than 2 seconds. Coloration: Skin is not jaundiced or pale. Findings: No erythema or rash. Neurological:      General: No focal deficit present. Mental Status: She is alert. On this date 7/5/2022 I have spent 30 minutes reviewing previous notes, test results and face to face with the patient discussing the diagnosis and importance of compliance with the treatment plan as well as documenting on the day of the visit. An electronic signature was used to authenticate this note.     --Annmarie Steven MD

## 2022-07-05 NOTE — PROGRESS NOTES
Health Maintenance Due   Topic Date Due    HIV screen  Never done    Chlamydia screen  Never done    Hepatitis C screen  Never done    Depression Monitoring  02/26/2022    Pap smear  Never done    COVID-19 Vaccine (3 - Booster for Moderna series) 06/03/2022

## 2022-07-05 NOTE — TELEPHONE ENCOUNTER
From: Guero Vizcaino  To: Jeronimo Montalvo MD  Sent: 7/4/2022 7:16 PM EDT  Subject: Weight loss    Hello! I was wondering I could make an appointment to talk with you about my weight loss. Also wanted to talk about Wegovy.  If you have any openings this week please let me know I can make anytime or anyday work :)

## 2022-07-06 ENCOUNTER — NURSE ONLY (OUTPATIENT)
Dept: LAB | Age: 22
End: 2022-07-06

## 2022-07-06 DIAGNOSIS — E66.01 CLASS 3 SEVERE OBESITY WITH BODY MASS INDEX (BMI) OF 40.0 TO 44.9 IN ADULT, UNSPECIFIED OBESITY TYPE, UNSPECIFIED WHETHER SERIOUS COMORBIDITY PRESENT (HCC): ICD-10-CM

## 2022-07-06 DIAGNOSIS — E88.81 INSULIN RESISTANCE: ICD-10-CM

## 2022-07-06 LAB
CHOLESTEROL, FASTING: 159 MG/DL (ref 100–199)
HDLC SERPL-MCNC: 40 MG/DL
LDL CHOLESTEROL CALCULATED: 83 MG/DL
TRIGLYCERIDE, FASTING: 179 MG/DL (ref 0–199)

## 2022-07-06 NOTE — PROGRESS NOTES
Attending Physician Statement  I have discussed the case, including pertinent history and exam findings with the resident. I also have seen the patient and performed key portions of the examination. I agree with the documented assessment and plan as documented by the resident.   GE modifier added to this encounter      Lizbet Alvarado MD 7/6/2022 8:47 AM

## 2022-07-14 ENCOUNTER — NURSE ONLY (OUTPATIENT)
Dept: LAB | Age: 22
End: 2022-07-14

## 2022-07-19 ENCOUNTER — OFFICE VISIT (OUTPATIENT)
Dept: FAMILY MEDICINE CLINIC | Age: 22
End: 2022-07-19
Payer: COMMERCIAL

## 2022-07-19 VITALS
DIASTOLIC BLOOD PRESSURE: 78 MMHG | TEMPERATURE: 97.1 F | HEART RATE: 92 BPM | SYSTOLIC BLOOD PRESSURE: 124 MMHG | HEIGHT: 66 IN | OXYGEN SATURATION: 98 % | BODY MASS INDEX: 39.05 KG/M2 | WEIGHT: 243 LBS | RESPIRATION RATE: 16 BRPM

## 2022-07-19 DIAGNOSIS — E66.01 CLASS 3 SEVERE OBESITY WITH BODY MASS INDEX (BMI) OF 40.0 TO 44.9 IN ADULT, UNSPECIFIED OBESITY TYPE, UNSPECIFIED WHETHER SERIOUS COMORBIDITY PRESENT (HCC): Primary | ICD-10-CM

## 2022-07-19 DIAGNOSIS — F33.2 SEVERE EPISODE OF RECURRENT MAJOR DEPRESSIVE DISORDER, WITHOUT PSYCHOTIC FEATURES (HCC): ICD-10-CM

## 2022-07-19 DIAGNOSIS — F41.9 ANXIETY AND DEPRESSION: ICD-10-CM

## 2022-07-19 DIAGNOSIS — F32.A ANXIETY AND DEPRESSION: ICD-10-CM

## 2022-07-19 PROCEDURE — 1036F TOBACCO NON-USER: CPT | Performed by: STUDENT IN AN ORGANIZED HEALTH CARE EDUCATION/TRAINING PROGRAM

## 2022-07-19 PROCEDURE — G8427 DOCREV CUR MEDS BY ELIG CLIN: HCPCS | Performed by: STUDENT IN AN ORGANIZED HEALTH CARE EDUCATION/TRAINING PROGRAM

## 2022-07-19 PROCEDURE — G8417 CALC BMI ABV UP PARAM F/U: HCPCS | Performed by: STUDENT IN AN ORGANIZED HEALTH CARE EDUCATION/TRAINING PROGRAM

## 2022-07-19 PROCEDURE — 99214 OFFICE O/P EST MOD 30 MIN: CPT | Performed by: STUDENT IN AN ORGANIZED HEALTH CARE EDUCATION/TRAINING PROGRAM

## 2022-07-19 RX ORDER — BUPROPION HYDROCHLORIDE 300 MG/1
300 TABLET ORAL EVERY MORNING
Qty: 30 TABLET | Refills: 0 | Status: SHIPPED | OUTPATIENT
Start: 2022-07-19 | End: 2022-08-25 | Stop reason: SINTOL

## 2022-07-19 RX ORDER — SEMAGLUTIDE 0.25 MG/.5ML
0.25 INJECTION, SOLUTION SUBCUTANEOUS
Qty: 2 ML | Refills: 0 | Status: SHIPPED | OUTPATIENT
Start: 2022-07-19 | End: 2022-08-25

## 2022-07-19 ASSESSMENT — ENCOUNTER SYMPTOMS
BLOOD IN STOOL: 0
SORE THROAT: 0
BACK PAIN: 0
COUGH: 0
SINUS PAIN: 0
CONSTIPATION: 0
NAUSEA: 0
ABDOMINAL DISTENTION: 0
VOMITING: 0
SHORTNESS OF BREATH: 0
SINUS PRESSURE: 0
WHEEZING: 0
DIARRHEA: 0
RHINORRHEA: 0
ABDOMINAL PAIN: 0

## 2022-07-19 NOTE — PROGRESS NOTES
Attending Physician Statement  I have discussed the case, including pertinent history and exam findings with the resident. I also have seen the patient and performed key portions of the examination. I agree with the documented assessment and plan as documented by the resident.   GC modifier added to this encounter      Lizbet Alvarado MD 7/19/2022 5:00 PM

## 2022-07-19 NOTE — PROGRESS NOTES
Janessa Nieves (:  2000) is a 25 y.o. female,Established patient, here for evaluation of the following chief complaint(s):  Follow-up (Wants to go over meds. Last time the wellbutrin is not what I wanted. Metformin was mentioned and she was not wanting that )         ASSESSMENT/PLAN:  1. Class 3 severe obesity with body mass index (BMI) of 40.0 to 44.9 in adult, unspecified obesity type, unspecified whether serious comorbidity present (HCC)  -     WEGOVY 0.25 MG/0.5ML SOAJ SC injection; Inject 0.25 mg into the skin every 7 days, Disp-2 mL, R-0, DAWNormal  -     buPROPion (WELLBUTRIN XL) 300 MG extended release tablet; Take 1 tablet by mouth every morning, Disp-30 tablet, R-0Normal  2. Anxiety and depression  -     buPROPion (WELLBUTRIN XL) 300 MG extended release tablet; Take 1 tablet by mouth every morning, Disp-30 tablet, R-0Normal  3. Severe episode of recurrent major depressive disorder, without psychotic features ([facility] [address]    Will increase Wellbutrin to 300 mg daily at this time  Will continue to keep a food log and follow-up in 4 weeks  Will start Wegovy at 0.25 mg subcu injection once every 7 days for 4 weeks. Increase water intake to 1 gallon a day, increase exercise to burn 500 jovita daily, aim for calorie target of 1500 to 1700 jovita daily. Return in about 4 weeks (around 2022). Subjective   SUBJECTIVE/OBJECTIVE:  HPI    Patient presents today for follow up weight loss. Wegovy    Works out an hour 3-4 times a week. Has been staying under 1300 calories. Has been ranging for [id number]. Does try to eat a lot of fruits and vegetables however has issues with texture. Does really like smoothies. Does not like salads. 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 She is trying to exercise more. She is working out about 1 hour a day 3 to 4 days a week. She does feel like she is drinking about 2-3 bottles of water every day.        Review of Systems   Constitutional:  Negative for chills and fever.   HENT:  Negative for congestion, rhinorrhea, sinus pressure, sinus pain and sore throat. Respiratory:  Negative for cough, shortness of breath and wheezing. Cardiovascular:  Negative for chest pain and leg swelling. Gastrointestinal:  Negative for abdominal distention, abdominal pain, blood in stool, constipation, diarrhea, nausea and vomiting. Endocrine: Negative for cold intolerance and heat intolerance. Genitourinary:  Negative for dysuria, frequency and urgency. Musculoskeletal:  Negative for arthralgias, back pain and myalgias. Skin:  Negative for rash. Neurological:  Negative for dizziness, light-headedness and headaches. Psychiatric/Behavioral:  Negative for dysphoric mood and sleep disturbance. Objective   Physical Exam  Constitutional:       Appearance: Normal appearance. She is obese. HENT:      Head: Normocephalic and atraumatic. Right Ear: External ear normal.      Left Ear: External ear normal.      Nose: Nose normal.      Mouth/Throat:      Mouth: Mucous membranes are moist.      Pharynx: Oropharynx is clear. Eyes:      Conjunctiva/sclera: Conjunctivae normal.   Cardiovascular:      Rate and Rhythm: Normal rate and regular rhythm. Pulses: Normal pulses. Heart sounds: Normal heart sounds. Pulmonary:      Effort: Pulmonary effort is normal.      Breath sounds: Normal breath sounds. Abdominal:      General: Abdomen is flat. Bowel sounds are normal.      Palpations: Abdomen is soft. Musculoskeletal:         General: Normal range of motion. Cervical back: Normal range of motion and neck supple. Right lower leg: No edema. Left lower leg: No edema. Skin:     General: Skin is warm. Neurological:      Mental Status: She is alert. Mental status is at baseline. Psychiatric:         Mood and Affect: Mood normal.         Behavior: Behavior normal.         Thought Content:  Thought content normal.         Judgment: Judgment normal. An electronic signature was used to authenticate this note.     --Royer Shook MD

## 2022-07-21 ENCOUNTER — OFFICE VISIT (OUTPATIENT)
Dept: PULMONOLOGY | Age: 22
End: 2022-07-21
Payer: COMMERCIAL

## 2022-07-21 VITALS
DIASTOLIC BLOOD PRESSURE: 84 MMHG | OXYGEN SATURATION: 98 % | WEIGHT: 243 LBS | HEART RATE: 92 BPM | TEMPERATURE: 97.7 F | SYSTOLIC BLOOD PRESSURE: 118 MMHG | BODY MASS INDEX: 41.48 KG/M2 | HEIGHT: 64 IN

## 2022-07-21 DIAGNOSIS — F41.1 GENERALIZED ANXIETY DISORDER: ICD-10-CM

## 2022-07-21 DIAGNOSIS — G47.11 IDIOPATHIC HYPERSOMNIA: Primary | ICD-10-CM

## 2022-07-21 DIAGNOSIS — G47.09 OTHER INSOMNIA: ICD-10-CM

## 2022-07-21 DIAGNOSIS — R53.83 FATIGUE, UNSPECIFIED TYPE: ICD-10-CM

## 2022-07-21 PROBLEM — F43.23 ADJUSTMENT DISORDER WITH MIXED ANXIETY AND DEPRESSED MOOD: Status: ACTIVE | Noted: 2017-01-11

## 2022-07-21 PROCEDURE — 1036F TOBACCO NON-USER: CPT | Performed by: NURSE PRACTITIONER

## 2022-07-21 PROCEDURE — 99214 OFFICE O/P EST MOD 30 MIN: CPT | Performed by: NURSE PRACTITIONER

## 2022-07-21 PROCEDURE — G8417 CALC BMI ABV UP PARAM F/U: HCPCS | Performed by: NURSE PRACTITIONER

## 2022-07-21 PROCEDURE — G8427 DOCREV CUR MEDS BY ELIG CLIN: HCPCS | Performed by: NURSE PRACTITIONER

## 2022-07-21 RX ORDER — BUSPIRONE HYDROCHLORIDE 10 MG/1
10 TABLET ORAL 3 TIMES DAILY PRN
Qty: 60 TABLET | Refills: 0 | Status: SHIPPED | OUTPATIENT
Start: 2022-07-21 | End: 2022-08-20

## 2022-07-21 NOTE — PROGRESS NOTES
[place] [facility] and [address] [place] [address] [facility]                                         [phone]  7/21/2022   Chief Complaint   Patient presents with    1 Month Follow-Up     Sleep f/u, insomnia, doxepin        Pt of Dr. Ricardo Green: 13  SAQLI: 33    Presentation:   Edward presents for sleep medicine follow up for insomnia  Has been taking Doxepin 25 mg nightly. Saw big benefit at first, is still getting benefit. Still trouble falling asleep. Takes 45-60 min to fall asleep. Once asleep  has less nocturnal awakenings. Waking up about 2 x per night, takes less than 30 min to fall back asleep   Wakes up un-reshreshed. Struggles with excessive sleepiness during the day   Still having anxiety. Buspar has been ineffective \" like taking sugar pill\"  In process of getting started on Wegovy injections for weight loss  Has been good about getting sleep routine and allowing at least 8 hours of sleep a night  Is currently dieting using Weight watchers. Not exercising  Rx for Somyrst online CBT submitted last visit, still awaiting approval from insurance     CMP wnl 12/2021, TSH wnl 12/2021 . Fasting Glucose 80.   CBC wnl 12/2021    Past Medical History:   Diagnosis Date    Anxiety     Impingement syndrome involving patellar fat pad of left knee 2021    Multiple allergies     Other insomnia     PCOS (polycystic ovarian syndrome)     Pilonidal cyst     Severe episode of recurrent major depressive disorder, without psychotic features ([facility] [address] 7/19/2022       Past Surgical History:   Procedure Laterality Date    CYST INCISION AND DRAINAGE  10/10/2020    pilonidal cyst-- [facility]    MYRINGOTOMY AND TYMPANOSTOMY TUBE PLACEMENT      20mos old- bilateral    PILONIDAL CYST EXCISION N/A 11/24/2020    PILONIDAL CYSTECTOMY performed by Kelly Bolaños MD at [address] [place] [address]  10/23/2017    SEPTOPLASTY N/A 10/23/2017    SEPTOPLASTY, POSSIBLE SUBMUCOUS RESECT TURBINATES PARTIAL LEFT performed by Dilan Ariza MD at [facility]  11/20/12    Dr Wil Gonzalez       Social History     Tobacco Use    Smoking status: Never    Smokeless tobacco: Never    Tobacco comments:     Never Smoker    Vaping Use    Vaping Use: Never used   Substance Use Topics    Alcohol use: Yes     Comment: ocassionally    Drug use: No       Allergies   Allergen Reactions    Dog Epithelium     Dust Mite Extract     Molds & Smuts     Other      Weeds Other reaction(s): Unknown    Seasonal     Tree Extract        Current Outpatient Medications   Medication Sig Dispense Refill    busPIRone (BUSPAR) 10 MG tablet Take 1 tablet by mouth 3 times daily as needed (anxiety) 60 tablet 0    buPROPion (WELLBUTRIN XL) 300 MG extended release tablet Take 1 tablet by mouth every morning 30 tablet 0    ondansetron (ZOFRAN ODT) 4 MG disintegrating tablet Take 1 tablet by mouth every 8 hours as needed for Nausea 20 tablet 1    doxepin (SINEQUAN) 25 MG capsule Take 1 capsule by mouth nightly 30 capsule 2    WEGOVY 0.25 MG/0.5ML SOAJ SC injection Inject 0.25 mg into the skin every 7 days (Patient not taking: Reported on 7/21/2022) 2 mL 0    Digital Therapy (SOMRYST) [facility] Please enroll patient in digital Somryst CBT for chronic insomnia (Patient not taking: Reported on 7/21/2022) 1 each 0     No current facility-administered medications for this visit.        Family History   Problem Relation Age of Onset    Asthma Mother     Diabetes Maternal Grandfather     Heart Disease Maternal Grandfather     High Blood Pressure Maternal Grandfather     Kidney Disease Maternal Grandfather     Cancer Maternal Grandfather     Arthritis Maternal Grandfather     Stroke Maternal Grandfather     Heart Disease Paternal Grandmother     High Blood Pressure Paternal Grandmother     High Cholesterol Paternal Grandmother     Early Death Paternal Grandmother         Review of Systems -   General ROS: stable / unchanged, + fatigue/ excessive daytime sleepiness   ENT ROS: negative for - nasal congestion, oral lesions or sore throat  Hematological andLymphatic ROS: negative  Endocrine ROS: negative  Respiratory ROS: no cough, shortness of breath, or wheezing  Cardiovascular ROS: no chest pain or dyspnea on exertion  Gastrointestinal ROS: no abdominal pain,change in bowel habits, or black or bloody stools  Musculoskeletal ROS: negative  Neurological ROS: negative    Physical Exam:    BMI:  Body mass index is 41.71 kg/m². Wt Readings from Last 3 Encounters:   07/21/22 243 lb (110.2 kg)   07/19/22 243 lb (110.2 kg)   07/05/22 241 lb 6.4 oz (109.5 kg)     Vitals: /84 (Site: Left Upper Arm, Position: Sitting, Cuff Size: Large Adult)   Pulse 92   Temp 97.7 °F (36.5 °C) (Temporal)   Ht 5' 4\" (1.626 m)   Wt 243 lb (110.2 kg)   LMP 06/29/2022 Comment: lasted 13 days  SpO2 98% Comment: r/a  BMI 41.71 kg/m²       Physical Exam  Vitals and nursing note reviewed. Constitutional:       Appearance: Normal appearance. She is obese. HENT:      Head: Normocephalic and atraumatic. Mouth/Throat:      Pharynx: Oropharynx is clear. Eyes:      Conjunctiva/sclera: Conjunctivae normal.   Pulmonary:      Effort: Pulmonary effort is normal. No tachypnea, bradypnea or respiratory distress. Skin:     Findings: No erythema or rash. Neurological:      Mental Status: She is alert and oriented to person, place, and time. Psychiatric:         Attention and Perception: Attention normal.         Mood and Affect: Mood normal.         Speech: Speech normal.         Behavior: Behavior normal.         Thought Content: Thought content normal.         Cognition and Memory: Cognition normal.         Judgment: Judgment normal.          Assessment      Diagnosis Orders   1. Idiopathic hypersomnia        2. Generalized anxiety disorder  busPIRone (BUSPAR) 10 MG tablet      3. BMI 40.0-44.9, adult ([facility] [address]        4. Fatigue, unspecified type        5.  Other insomnia Plan   -increase Buspar :  10 mg PO TID PRN anxiety   -continue Wellbutrin for depression per PCP   -start process for approval for Xywav due to persistent IH   continue to take Doxepin for now , will stop once start Xywav. Educated on possible side effects. Denies pregnancy . Is not actively trying to get pregnant   Will need urine preg testing prior to starting therapy   -check Vit D, B12/folate, Iron labs   Educated on interactions of Xywav: instructed not to take any pain meds, alcohol or other prescriptions without clearance with Xywav. Given xywav PI sheet  - She call my officefor earlier appointment if needed for worsening of sleep symptoms.   - She was instructed on weight loss/ recommend adding exercise   - Annikaia was educated about my impression and plan. Patient verbalizes understanding.     We will see Asaf Rodriguez back in: 3 months   -billing base on time: 30 min spent on encounter   Electronically signed by LASHAWN Contreras CNP on 7/21/2022 at 9:01 AM

## 2022-07-25 ENCOUNTER — TELEPHONE (OUTPATIENT)
Dept: PULMONOLOGY | Age: 22
End: 2022-07-25

## 2022-07-26 LAB — CYTOLOGY THIN PREP PAP: NORMAL

## 2022-08-03 ENCOUNTER — NURSE ONLY (OUTPATIENT)
Dept: LAB | Age: 22
End: 2022-08-03

## 2022-08-03 DIAGNOSIS — F41.1 GENERALIZED ANXIETY DISORDER: ICD-10-CM

## 2022-08-03 DIAGNOSIS — G47.11 IDIOPATHIC HYPERSOMNIA: ICD-10-CM

## 2022-08-03 DIAGNOSIS — R53.83 FATIGUE, UNSPECIFIED TYPE: ICD-10-CM

## 2022-08-03 LAB
FOLATE: 14 NG/ML (ref 4.8–24.2)
IRON: 78 UG/DL (ref 50–170)
PREGNANCY, URINE: NEGATIVE
VITAMIN B-12: 548 PG/ML (ref 211–911)
VITAMIN D 25-HYDROXY: 22 NG/ML (ref 30–100)

## 2022-08-11 NOTE — TELEPHONE ENCOUNTER
Phoned and spoke to [employer] they reported that they are waiting for response on Appeal, everything is up to date. Will watch for Appeal decision.

## 2022-08-25 ENCOUNTER — TELEPHONE (OUTPATIENT)
Dept: FAMILY MEDICINE CLINIC | Age: 22
End: 2022-08-25

## 2022-08-25 ENCOUNTER — NURSE ONLY (OUTPATIENT)
Dept: LAB | Age: 22
End: 2022-08-25

## 2022-08-25 ENCOUNTER — OFFICE VISIT (OUTPATIENT)
Dept: FAMILY MEDICINE CLINIC | Age: 22
End: 2022-08-25
Payer: COMMERCIAL

## 2022-08-25 VITALS
DIASTOLIC BLOOD PRESSURE: 72 MMHG | BODY MASS INDEX: 40.53 KG/M2 | SYSTOLIC BLOOD PRESSURE: 102 MMHG | OXYGEN SATURATION: 98 % | HEART RATE: 91 BPM | HEIGHT: 64 IN | RESPIRATION RATE: 14 BRPM | WEIGHT: 237.4 LBS | TEMPERATURE: 97 F

## 2022-08-25 DIAGNOSIS — R00.2 PALPITATIONS: ICD-10-CM

## 2022-08-25 DIAGNOSIS — R00.2 PALPITATIONS: Primary | ICD-10-CM

## 2022-08-25 DIAGNOSIS — E66.01 CLASS 3 SEVERE OBESITY WITH BODY MASS INDEX (BMI) OF 40.0 TO 44.9 IN ADULT, UNSPECIFIED OBESITY TYPE, UNSPECIFIED WHETHER SERIOUS COMORBIDITY PRESENT (HCC): ICD-10-CM

## 2022-08-25 LAB
T4 FREE: 1.07 NG/DL (ref 0.93–1.76)
TSH SERPL DL<=0.05 MIU/L-ACNC: 0.77 UIU/ML (ref 0.4–4.2)

## 2022-08-25 PROCEDURE — 99214 OFFICE O/P EST MOD 30 MIN: CPT | Performed by: STUDENT IN AN ORGANIZED HEALTH CARE EDUCATION/TRAINING PROGRAM

## 2022-08-25 PROCEDURE — 1036F TOBACCO NON-USER: CPT | Performed by: STUDENT IN AN ORGANIZED HEALTH CARE EDUCATION/TRAINING PROGRAM

## 2022-08-25 PROCEDURE — G8417 CALC BMI ABV UP PARAM F/U: HCPCS | Performed by: STUDENT IN AN ORGANIZED HEALTH CARE EDUCATION/TRAINING PROGRAM

## 2022-08-25 PROCEDURE — 93000 ELECTROCARDIOGRAM COMPLETE: CPT | Performed by: STUDENT IN AN ORGANIZED HEALTH CARE EDUCATION/TRAINING PROGRAM

## 2022-08-25 PROCEDURE — G8427 DOCREV CUR MEDS BY ELIG CLIN: HCPCS | Performed by: STUDENT IN AN ORGANIZED HEALTH CARE EDUCATION/TRAINING PROGRAM

## 2022-08-25 RX ORDER — SEMAGLUTIDE 1.34 MG/ML
0.5 INJECTION, SOLUTION SUBCUTANEOUS WEEKLY
Qty: 1.5 ML | Refills: 0 | Status: SHIPPED | OUTPATIENT
Start: 2022-08-25 | End: 2022-09-22

## 2022-08-25 ASSESSMENT — ENCOUNTER SYMPTOMS
CONSTIPATION: 0
COUGH: 0
NAUSEA: 0
CHEST TIGHTNESS: 0
DIARRHEA: 0
SHORTNESS OF BREATH: 0
VOMITING: 0

## 2022-08-25 NOTE — PROGRESS NOTES
S: 25 y.o. female with   Chief Complaint   Patient presents with    Medication Check       Chief complaint, [place], and all pertinent details of the case reviewed with the resident. Please see resident's note for specific details discussed at today's visit. BP Readings from Last 3 Encounters:   08/25/22 102/72   07/21/22 118/84   07/19/22 124/78     Wt Readings from Last 3 Encounters:   08/25/22 237 lb 6.4 oz (107.7 kg)   07/21/22 243 lb (110.2 kg)   07/19/22 243 lb (110.2 kg)       O: VS:  height is 5' 4\" (1.626 m) and weight is 237 lb 6.4 oz (107.7 kg). Her skin temperature is 97 °F (36.1 °C). Her blood pressure is 102/72 and her pulse is 91. Her respiration is 14 and oxygen saturation is 98%. AAO/NAD, appropriate affect for mood  EKG today was normal     Diagnosis Orders   1. Palpitations  TSH    T4, Free    EKG 12 Lead      2. Class 3 severe obesity with body mass index (BMI) of 40.0 to 44.9 in adult, unspecified obesity type, unspecified whether serious comorbidity present ([facility] [address]  Semaglutide,0.25 or 0.5MG/DOS, (OZEMPIC, 0.25 OR 0.5 MG/DOSE,) 2 MG/1.5ML SOPN          Plan:  -Will stop wellbutrin for now, as unclear if sense of heart papitations started while taking it  -Stay well hydrated as increased heart rate with standing can be orthostatic change because of dehydration  -Check TSH and T4  -Increase ozympic to 0.5  F/u in 4 weeks    Health Maintenance Due   Topic Date Due    HIV screen  Never done    Chlamydia screen  Never done    Hepatitis C screen  Never done    COVID-19 Vaccine (3 - Booster for [place] A[place] series) 06/03/2022       Attending Physician Statement  I have discussed the case, including pertinent history and exam findings with the resident. I also have seen the patient and performed key portions of the examination. I agree with the documented assessment and plan as documented by the resident.         Blessing Pena MD 8/25/2022 9:21 AM

## 2022-08-25 NOTE — TELEPHONE ENCOUNTER
----- Message from Royer Shook MD sent at 8/25/2022  3:22 PM EDT -----  Thyroid looked normal, nothing to do from that standpoint.

## 2022-08-29 ENCOUNTER — HOSPITAL ENCOUNTER (OUTPATIENT)
Age: 22
Discharge: HOME | End: 2022-08-29
Payer: COMMERCIAL

## 2022-08-29 ENCOUNTER — TELEPHONE (OUTPATIENT)
Dept: PULMONOLOGY | Age: 22
End: 2022-08-29

## 2022-08-29 DIAGNOSIS — R52 BODY ACHES: ICD-10-CM

## 2022-08-29 DIAGNOSIS — J02.9 SORE THROAT: ICD-10-CM

## 2022-08-29 DIAGNOSIS — Z20.822 EXPOSURE TO COVID-19 VIRUS: ICD-10-CM

## 2022-08-29 DIAGNOSIS — J02.9 SORE THROAT: Primary | ICD-10-CM

## 2022-08-29 DIAGNOSIS — R53.83 OTHER FATIGUE: ICD-10-CM

## 2022-08-29 LAB
INFLUENZA A: NOT DETECTED
INFLUENZA B: NOT DETECTED
SARS-COV-2 RNA, RT PCR: NOT DETECTED

## 2022-08-29 PROCEDURE — 87636 SARSCOV2 & INF A&B AMP PRB: CPT

## 2022-08-29 NOTE — TELEPHONE ENCOUNTER
C/o Fatigue, headache, sore throat, hoarseness, dizziness, achy, feeling feverish with no elevated temps     Symptoms started Friday, neg home COVID test on Sat. Recommend repeating COVID testing and also test for influenza A/B  Orders placed. Pt notified.     Quarantine until results known

## 2022-08-30 DIAGNOSIS — J02.9 SORE THROAT: Primary | ICD-10-CM

## 2022-08-30 RX ORDER — AMOXICILLIN 500 MG/1
500 CAPSULE ORAL 2 TIMES DAILY
Qty: 20 CAPSULE | Refills: 0 | Status: SHIPPED | OUTPATIENT
Start: 2022-08-30 | End: 2022-09-09

## 2022-08-31 ENCOUNTER — NURSE ONLY (OUTPATIENT)
Dept: LAB | Age: 22
End: 2022-08-31

## 2022-09-29 ENCOUNTER — OFFICE VISIT (OUTPATIENT)
Dept: FAMILY MEDICINE CLINIC | Age: 22
End: 2022-09-29
Payer: COMMERCIAL

## 2022-09-29 VITALS
WEIGHT: 232.8 LBS | SYSTOLIC BLOOD PRESSURE: 118 MMHG | OXYGEN SATURATION: 98 % | RESPIRATION RATE: 14 BRPM | HEART RATE: 91 BPM | TEMPERATURE: 97.1 F | DIASTOLIC BLOOD PRESSURE: 70 MMHG | HEIGHT: 64 IN | BODY MASS INDEX: 39.75 KG/M2

## 2022-09-29 DIAGNOSIS — R00.2 PALPITATIONS: Primary | ICD-10-CM

## 2022-09-29 DIAGNOSIS — R11.0 NAUSEA: ICD-10-CM

## 2022-09-29 DIAGNOSIS — E66.01 CLASS 3 SEVERE OBESITY WITH BODY MASS INDEX (BMI) OF 40.0 TO 44.9 IN ADULT, UNSPECIFIED OBESITY TYPE, UNSPECIFIED WHETHER SERIOUS COMORBIDITY PRESENT (HCC): ICD-10-CM

## 2022-09-29 PROCEDURE — 99213 OFFICE O/P EST LOW 20 MIN: CPT | Performed by: STUDENT IN AN ORGANIZED HEALTH CARE EDUCATION/TRAINING PROGRAM

## 2022-09-29 PROCEDURE — 1036F TOBACCO NON-USER: CPT | Performed by: STUDENT IN AN ORGANIZED HEALTH CARE EDUCATION/TRAINING PROGRAM

## 2022-09-29 PROCEDURE — G8417 CALC BMI ABV UP PARAM F/U: HCPCS | Performed by: STUDENT IN AN ORGANIZED HEALTH CARE EDUCATION/TRAINING PROGRAM

## 2022-09-29 PROCEDURE — G8427 DOCREV CUR MEDS BY ELIG CLIN: HCPCS | Performed by: STUDENT IN AN ORGANIZED HEALTH CARE EDUCATION/TRAINING PROGRAM

## 2022-09-29 RX ORDER — ONDANSETRON 4 MG/1
4 TABLET, ORALLY DISINTEGRATING ORAL EVERY 8 HOURS PRN
Qty: 20 TABLET | Refills: 1 | Status: SHIPPED | OUTPATIENT
Start: 2022-09-29

## 2022-09-29 RX ORDER — SEMAGLUTIDE 1.34 MG/ML
0.5 INJECTION, SOLUTION SUBCUTANEOUS WEEKLY
Qty: 3 ADJUSTABLE DOSE PRE-FILLED PEN SYRINGE | Refills: 3 | Status: SHIPPED | OUTPATIENT
Start: 2022-09-29 | End: 2022-11-02

## 2022-09-29 ASSESSMENT — ENCOUNTER SYMPTOMS
COUGH: 0
WHEEZING: 0
SHORTNESS OF BREATH: 0
VOMITING: 0
SINUS PRESSURE: 0
SINUS PAIN: 0
RHINORRHEA: 0
DIARRHEA: 0
CHEST TIGHTNESS: 1
CONSTIPATION: 0
NAUSEA: 1

## 2022-09-29 NOTE — PROGRESS NOTES
Ferny Ramos (:  2000) is a 25 y.o. female,Established patient, here for evaluation of the following chief complaint(s):  Medication Check and Other (Discuss heart moniter)         ASSESSMENT/PLAN:  1. Palpitations  -     Holter Monitor 24 Hour  2. Class 3 severe obesity with body mass index (BMI) of 40.0 to 44.9 in adult, unspecified obesity type, unspecified whether serious comorbidity present ([facility] [address]  -     Semaglutide,0.25 or 0.5MG/DOS, (OZEMPIC, 0.25 OR 0.5 MG/DOSE,) 2 MG/1.5ML SOPN; Inject 0.5 mg into the skin once a week, Disp-3 Adjustable Dose Pre-filled Pen Syringe, R-3Normal  3. Nausea  -     ondansetron (ZOFRAN ODT) 4 MG disintegrating tablet; Take 1 tablet by mouth every 8 hours as needed for Nausea, Disp-20 tablet, R-1Normal    For continued daily palpitations we will get a 24-hour Holter monitor, however I do think this could be either anxiety or GI in nature as opposed to cardiac issue. Will refill Ozempic today as patient is tolerating it well. Will refill patient's Zofran for occasional nausea. Plan to follow-up in 3 months or sooner if patient desires. Return in about 3 months (around 2022). Subjective   SUBJECTIVE/OBJECTIVE:  HPI    Mildred Cornelius presents for f/u for obesity and palpitations. Obesity: Last visit started Ozempic, tolerating it well at the 0.5 dose. If she eat's breakfast she's tolerating the medication ok. Has lost 13 lbs. If she doesn't eat breakfast, does get nausea. Is trying to drink more water. For exercise is bike riding, plans to hike this weekend. Palpitations: Still an issue. Last visit EKG was normal. Palpitations at least once a day. Chest tightness a couple times a week. This has been going on since November/December. Doesn't know if it's related to anxiety, but her family history makes her concerned. Review of Systems   Constitutional:  Positive for appetite change (decreased appetite on Ozempic).  Negative for chills, fatigue and fever. HENT:  Negative for congestion, rhinorrhea, sinus pressure and sinus pain. Respiratory:  Positive for chest tightness. Negative for cough, shortness of breath and wheezing. Cardiovascular:  Positive for palpitations. Negative for chest pain and leg swelling. Gastrointestinal:  Positive for nausea. Negative for constipation, diarrhea and vomiting. Genitourinary:  Negative for frequency and urgency. Musculoskeletal:  Negative for arthralgias, joint swelling and myalgias. Skin:  Negative for rash and wound. Neurological:  Negative for dizziness, light-headedness and headaches. Objective   Vitals:    09/29/22 0801   BP: 118/70   Site: Left Upper Arm   Position: Sitting   Cuff Size: Medium Adult   Pulse: 91   Resp: 14   Temp: 97.1 °F (36.2 °C)   TempSrc: Skin   SpO2: 98%   Weight: 232 lb 12.8 oz (105.6 kg)   Height: 5' 4\" (1.626 m)       Physical Exam  Constitutional:       Appearance: Normal appearance. She is obese. HENT:      Head: Normocephalic and atraumatic. Right Ear: External ear normal.      Left Ear: External ear normal.      Nose: Nose normal.      Mouth/Throat:      Mouth: Mucous membranes are moist.      Pharynx: Oropharynx is clear. Eyes:      Conjunctiva/sclera: Conjunctivae normal.   Cardiovascular:      Rate and Rhythm: Normal rate and regular rhythm. Pulmonary:      Effort: Pulmonary effort is normal.   Neurological:      Mental Status: She is alert. Mental status is at baseline. Psychiatric:         Mood and Affect: Mood normal.         Behavior: Behavior normal.         Thought Content: Thought content normal.         Judgment: Judgment normal.          An electronic signature was used to authenticate this note.     --Syed Cabrales MD

## 2022-10-11 ENCOUNTER — HOSPITAL ENCOUNTER (OUTPATIENT)
Dept: NON INVASIVE DIAGNOSTICS | Age: 22
Discharge: HOME | End: 2022-10-11
Payer: COMMERCIAL

## 2022-10-11 PROCEDURE — 93225 XTRNL ECG REC<48 HRS REC: CPT

## 2022-10-11 PROCEDURE — 93226 XTRNL ECG REC<48 HR SCAN A/R: CPT

## 2022-10-11 NOTE — PROCEDURES
24 hour Holter Monitor was applied to patient.  Patient was instructed to remove monitor on 10/12 at 753 and return to the  of the hospital. Instructions were given on how to turn monitor off after removal. The serial number on the Holter Monitor is [id number]

## 2022-10-14 LAB
ACQUISITION DURATION: NORMAL S
AVERAGE HEART RATE: 98 BPM
HOOKUP DATE: NORMAL
HOOKUP TIME: NORMAL
MAX HEART RATE TIME/DATE: NORMAL
MAX HEART RATE: 153 BPM
MIN HEART RATE TIME/DATE: NORMAL
MIN HEART RATE: 59 BPM
NUMBER OF QRS COMPLEXES: NORMAL
NUMBER OF SUPRAVENTRICULAR COUPLETS: 0
NUMBER OF SUPRAVENTRICULAR ECTOPICS: 13
NUMBER OF SUPRAVENTRICULAR ISOLATED BEATS: 13
NUMBER OF VENTRICULAR BIGEMINAL CYCLES: 0
NUMBER OF VENTRICULAR COUPLETS: 0
NUMBER OF VENTRICULAR ECTOPICS: 0

## 2022-10-17 ENCOUNTER — TELEPHONE (OUTPATIENT)
Dept: FAMILY MEDICINE CLINIC | Age: 22
End: 2022-10-17

## 2022-10-17 NOTE — TELEPHONE ENCOUNTER
----- Message from Eulalio Handley MD sent at 10/17/2022 10:19 AM EDT -----  Can you please let patient know the results of her holter monitor look good. Her heart was normal sinus rhythm and they did not note any arrhthymias. There were some premature beats that may be associated with emotional stress, excessive intake of ca  ffeinated drinks, however they were within the accepted normal range. Please let me know if there are other questions. Thanks!

## 2022-10-17 NOTE — TELEPHONE ENCOUNTER
Pt informed and verbalized understanding. She would like to get a referral to cardio her at [facility] if possible.  Please advise

## 2022-10-18 ENCOUNTER — TELEPHONE (OUTPATIENT)
Dept: CARDIOLOGY CLINIC | Age: 22
End: 2022-10-18

## 2022-10-18 DIAGNOSIS — R00.2 PALPITATIONS: Primary | ICD-10-CM

## 2022-10-18 NOTE — TELEPHONE ENCOUNTER
Procedure Modifiers Provider Requested Approved   REF12 - AMB REFERRAL TO CARDIOLOGY None  1 1     Diagnosis Information    Diagnosis   R00.2 (ICD-10-CM) - Palpitations     LM for patient to call our office back.

## 2022-10-21 ENCOUNTER — OFFICE VISIT (OUTPATIENT)
Dept: PULMONOLOGY | Age: 22
End: 2022-10-21
Payer: COMMERCIAL

## 2022-10-21 VITALS
TEMPERATURE: 97.5 F | OXYGEN SATURATION: 97 % | SYSTOLIC BLOOD PRESSURE: 114 MMHG | DIASTOLIC BLOOD PRESSURE: 82 MMHG | BODY MASS INDEX: 39.44 KG/M2 | HEIGHT: 64 IN | WEIGHT: 231 LBS | HEART RATE: 130 BPM

## 2022-10-21 DIAGNOSIS — F41.0 GENERALIZED ANXIETY DISORDER WITH PANIC ATTACKS: Primary | ICD-10-CM

## 2022-10-21 DIAGNOSIS — G47.10 HYPERSOMNIA: ICD-10-CM

## 2022-10-21 DIAGNOSIS — F41.1 GENERALIZED ANXIETY DISORDER WITH PANIC ATTACKS: Primary | ICD-10-CM

## 2022-10-21 DIAGNOSIS — F41.1 GENERALIZED ANXIETY DISORDER: ICD-10-CM

## 2022-10-21 PROCEDURE — G8427 DOCREV CUR MEDS BY ELIG CLIN: HCPCS | Performed by: NURSE PRACTITIONER

## 2022-10-21 PROCEDURE — 99214 OFFICE O/P EST MOD 30 MIN: CPT | Performed by: NURSE PRACTITIONER

## 2022-10-21 PROCEDURE — G8484 FLU IMMUNIZE NO ADMIN: HCPCS | Performed by: NURSE PRACTITIONER

## 2022-10-21 PROCEDURE — 1036F TOBACCO NON-USER: CPT | Performed by: NURSE PRACTITIONER

## 2022-10-21 PROCEDURE — G8417 CALC BMI ABV UP PARAM F/U: HCPCS | Performed by: NURSE PRACTITIONER

## 2022-10-21 RX ORDER — CLONAZEPAM 0.5 MG/1
0.25 TABLET ORAL 2 TIMES DAILY
Qty: 30 TABLET | Refills: 0 | Status: SHIPPED | OUTPATIENT
Start: 2022-10-21 | End: 2022-11-20

## 2022-10-21 ASSESSMENT — ENCOUNTER SYMPTOMS
NAUSEA: 0
SHORTNESS OF BREATH: 0
DIARRHEA: 0
EYES NEGATIVE: 1
WHEEZING: 0
VOMITING: 0
COUGH: 0
ABDOMINAL PAIN: 0

## 2022-10-21 NOTE — PROGRESS NOTES
[place] [facility], [facility] and [address]      Sleep Medicine clinic Follow up    Ferne Angelucci                                               Chief complaint and [place]: Edward came for follow up regarding   Chief Complaint   Patient presents with    Follow-up     3 month insomnia follow up, per pt request.        3 month f/u for insomnia   Persistent trouble falling asleep. Still getting \" jolt of energy\" when trying to fall asleep. Taking 45 min -1 hours to fall asleep , typically staying asleep ok  Sleeping about 8 hours   Does get more sleep on weekends, up to 10 hours. - still feels exhausted. Feels \" stressed \" all the time. Took semester off at school due to stress at work / life - felt it was too much   Having palpitations ,  wore holter monitor ordered by PCP, showed elevated HR at times. 13 episodes of SVT  and 5 delays - states report was read as normal- PCP has referred to cardiology     Since last visit started on Ozempic for wt loss. Has lost 15 lbs, feels at stand still with wt. The medication has not caused any changes in sleep . Tried buspar 10 mg TID - took up to 40 mg at one time and did not affect her at all. She stopped taking due to ineffectiveness     Having panic attacks. Having them at work. Tried and failed on Prozac , Zoloft , Celexa, and Lexapro - prescribed in past by PCP    She aslo is practicing good sleep hygiene practices. Shedenies any new problems. Anna Sawyer was denied by her insurance  Found no benefit with Ambien, insurance would not cover long acting Ambien  Melatonin was not helpful       Vitals: /82 (Site: Left Upper Arm, Position: Sitting, Cuff Size: Medium Adult)   Pulse (!) 130   Temp 97.5 °F (36.4 °C) (Skin)   Ht 5' 4\" (1.626 m)   Wt 231 lb (104.8 kg)   SpO2 97% Comment: Patient on room air  BMI 39.65 kg/m²   Body mass index is 39.65 kg/m².      ESS: 10  SAQLI: 27    Neck: 15 inches  MP: 1    Review of Systems Constitutional:  Positive for fatigue. Negative for activity change, appetite change, chills, fever and unexpected weight change. HENT: Negative. Eyes: Negative. Respiratory:  Negative for cough, shortness of breath and wheezing. Cardiovascular:  Positive for palpitations. Negative for chest pain and leg swelling. Gastrointestinal:  Negative for abdominal pain, diarrhea, nausea and vomiting. Genitourinary: Negative. Musculoskeletal: Negative. Skin: Negative. Neurological: Negative. Hematological: Negative. Psychiatric/Behavioral:  Positive for sleep disturbance. The patient is nervous/anxious. Physical Exam  Vitals and nursing note reviewed. Constitutional:       Appearance: Normal appearance. She is obese. HENT:      Head: Normocephalic and atraumatic. Mouth/Throat:      Pharynx: Oropharynx is clear. Eyes:      Conjunctiva/sclera: Conjunctivae normal.   Pulmonary:      Effort: Pulmonary effort is normal. No tachypnea, bradypnea or respiratory distress. Skin:     Findings: No erythema or rash. Neurological:      Mental Status: She is alert and oriented to person, place, and time. Psychiatric:         Attention and Perception: Attention normal.         Mood and Affect: Mood normal.         Speech: Speech normal.         Behavior: Behavior normal.         Thought Content: Thought content normal.         Cognition and Memory: Cognition normal.         Judgment: Judgment normal.       Diagnostic Data:    Assessment    Diagnosis Orders   1. Generalized anxiety disorder with panic attacks  clonazePAM (KLONOPIN) 0.5 MG tablet      2. Generalized anxiety disorder        3. Hypersomnia        4.  BMI 40.0-44.9, adult ([facility] [address]            Plan     -We will continue to see if we can get prior Auth approved for the [facility] for hypersomnia   -For now to treat the panic attacks start clonazepam 0.25 mg twice daily  -discontinue buspar due to ineffectiveness   -Discussed the possible need for psychology referral, she would like to hold off at this time  -Keep consult with cardiology to further evaluate palpitations-although likely this is due to her uncontrolled anxiety/panic attacks continue to follow symptoms with the new medication  -Continue to practice good sleep hygiene     Follow up in Sleep Clinic 1 month  Ruben Guevara educated about her condition and my plan. She verbalizes understanding.     billing based on medical decision making   Electronically signed by LASHAWN Catrer CNP on 10/21/2022 at 1:53 PM

## 2022-11-01 ENCOUNTER — OFFICE VISIT (OUTPATIENT)
Dept: CARDIOLOGY CLINIC | Age: 22
End: 2022-11-01
Payer: COMMERCIAL

## 2022-11-01 VITALS
WEIGHT: 231 LBS | SYSTOLIC BLOOD PRESSURE: 112 MMHG | HEIGHT: 64 IN | DIASTOLIC BLOOD PRESSURE: 82 MMHG | BODY MASS INDEX: 39.44 KG/M2 | HEART RATE: 88 BPM

## 2022-11-01 DIAGNOSIS — R00.2 PALPITATION: Primary | ICD-10-CM

## 2022-11-01 PROCEDURE — G8484 FLU IMMUNIZE NO ADMIN: HCPCS | Performed by: INTERNAL MEDICINE

## 2022-11-01 PROCEDURE — G8427 DOCREV CUR MEDS BY ELIG CLIN: HCPCS | Performed by: INTERNAL MEDICINE

## 2022-11-01 PROCEDURE — 99204 OFFICE O/P NEW MOD 45 MIN: CPT | Performed by: INTERNAL MEDICINE

## 2022-11-01 PROCEDURE — 1036F TOBACCO NON-USER: CPT | Performed by: INTERNAL MEDICINE

## 2022-11-01 PROCEDURE — G8417 CALC BMI ABV UP PARAM F/U: HCPCS | Performed by: INTERNAL MEDICINE

## 2022-11-01 RX ORDER — ASPIRIN 81 MG/1
81 TABLET ORAL DAILY
COMMUNITY

## 2022-11-01 NOTE — PROGRESS NOTES
[address]  [address] [place] [address]  Dept: [phone]  Dept Fax: [phone]  Loc: [phone]    Visit Date: 11/1/2022    Ms. Melvin Bahena is a 25 y.o. female  who presented for:  Chief Complaint   Patient presents with    New Patient     Fast hr and palpitations        HPI:   26 yo F c hx of Anxiety, PCOS, depression, insomnia is here for palpitations and dizziness. 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 She reports palpitations have been going on for 3 months and fast HR has been there for 1 year. She has intermittent dizziness. She has poor sleep. Current Outpatient Medications:     aspirin 81 MG EC tablet, Take 81 mg by mouth daily, Disp: , Rfl:     Prenatal MV-Min-Fe Fum-FA-DHA (PRENATAL 1 PO), Take by mouth, Disp: , Rfl:     Cholecalciferol (VITAMIN D3) 1.25 MG (09768 UT) TABS, Take by mouth, Disp: , Rfl:     IRON-FOLIC ACID PO, Take by mouth, Disp: , Rfl:     NONFORMULARY, [place] 700mg, Disp: , Rfl:     NONFORMULARY, Shirin+D Chiro 2 caps, Disp: , Rfl:     Omega-3 Fatty Acids (OMEGA 3 PO), Take by mouth, Disp: , Rfl:     metoprolol tartrate (LOPRESSOR) 25 MG tablet, Take 1 tablet by mouth daily as needed (palpitation), Disp: 180 tablet, Rfl: 1    clonazePAM (KLONOPIN) 0.5 MG tablet, Take 0.5 tablets by mouth in the morning and at bedtime for 30 days. , Disp: 30 tablet, Rfl: 0    Semaglutide,0.25 or 0.5MG/DOS, (OZEMPIC, 0.25 OR 0.5 MG/DOSE,) 2 MG/1.5ML SOPN, Inject 0.5 mg into the skin once a week, Disp: 3 Adjustable Dose Pre-filled Pen Syringe, Rfl: 3    ondansetron (ZOFRAN ODT) 4 MG disintegrating tablet, Take 1 tablet by mouth every 8 hours as needed for Nausea, Disp: 20 tablet, Rfl: 1    Past [address]  has a past medical history of Anxiety, Impingement syndrome involving patellar fat pad of left knee, Multiple allergies, Other insomnia, PCOS (polycystic ovarian syndrome), Pilonidal cyst, and Severe episode of recurrent major depressive disorder, without psychotic features ([address] Social History  Edward  reports that she has never smoked. She has never used smokeless tobacco. She reports current alcohol use. She reports that she does not use drugs. Family History  Edward family history includes Arthritis in her maternal grandfather; Asthma in her mother; Cancer in her maternal grandfather; Diabetes in her maternal grandfather; Early Death in her paternal grandmother; Heart Disease in her maternal grandfather and paternal grandmother; High Blood Pressure in her maternal grandfather and paternal grandmother; High Cholesterol in her paternal grandmother; Kidney Disease in her maternal grandfather; Stroke in her maternal grandfather. Past Surgical History   Past Surgical History:   Procedure Laterality Date    CYST INCISION AND DRAINAGE  10/10/2020    pilonidal cyst-- [facility]    MYRINGOTOMY AND TYMPANOSTOMY TUBE PLACEMENT      18mos old- bilateral    PILONIDAL CYST EXCISION N/A 11/24/2020    PILONIDAL CYSTECTOMY performed by Nataly Giles MD at [address] [place]  10/23/2017    SEPTOPLASTY N/A 10/23/2017    SEPTOPLASTY, POSSIBLE SUBMUCOUS RESECT TURBINATES PARTIAL LEFT performed by Kristine Pardo MD at [address]  11/20/12    Dr Ravi Ewing       Subjective:     REVIEW OF SYSTEMS  Constitutional: denies sweats, chills and fever  HENT: denies  congestion, sinus pressure, sneezing and sore throat. Eyes: denies  pain, discharge, redness and itching. Respiratory: denies apnea, cough  Gastrointestinal: denies blood in stool, constipation, diarrhea   Endocrine: denies cold intolerance, heat intolerance, polydipsia. Genitourinary: denies dysuria, enuresis, flank pain and hematuria. Musculoskeletal: denies arthralgias, joint swelling and neck pain. Neurological: denies numbness and headaches.    Psychiatric/Behavioral: denies agitation, confusion, decreased concentration and dysphoric mood    All others reviewed and are negative. Objective:     /82   Pulse 88   Ht 5' 4\" (1.626 m)   Wt 231 lb (104.8 kg)   BMI 39.65 kg/m²     Wt Readings from Last 3 Encounters:   11/01/22 231 lb (104.8 kg)   10/21/22 231 lb (104.8 kg)   09/29/22 232 lb 12.8 oz (105.6 kg)     BP Readings from Last 3 Encounters:   11/01/22 112/82   10/21/22 114/82   09/29/22 118/70       PHYSICAL EXAM  Constitutional: Oriented to person, place, and time. Appears well-developed and well-nourished. HENT:   Head: Normocephalic and atraumatic. Eyes: EOM are normal. Pupils are equal, round, and reactive to light. Neck: Normal range of motion. Neck supple. No JVD present. Cardiovascular: Normal rate , normal heart sounds and intact distal pulses. Pulmonary/Chest: Effort normal and breath sounds normal. No respiratory distress. No wheezes. No rales. Abdominal: Soft. Bowel sounds are normal. No distension. There is no tenderness. Musculoskeletal: Normal range of motion. No edema. Neurological: Alert and oriented to person, place, and time. No cranial nerve deficit. Coordination normal.   Skin: Skin is warm and dry. Psychiatric: Normal mood and affect.        No results found for: CKTOTAL, CKMB, CKMBINDEX    Lab Results   Component Value Date/Time    WBC 7.6 12/16/2021 10:52 AM    RBC 4.59 12/16/2021 10:52 AM    HGB 14.3 12/16/2021 10:52 AM    HCT 43.8 12/16/2021 10:52 AM    MCV 95.4 12/16/2021 10:52 AM    MCH 31.2 12/16/2021 10:52 AM    MCHC 32.6 12/16/2021 10:52 AM    RDW 12.5 06/23/2016 05:02 AM     12/16/2021 10:52 AM    MPV 10.5 12/16/2021 10:52 AM       Lab Results   Component Value Date/Time     12/16/2021 10:52 AM    K 4.4 12/16/2021 10:52 AM     12/16/2021 10:52 AM    CO2 23 12/16/2021 10:52 AM    BUN 10 12/16/2021 10:52 AM    LABALBU 4.7 12/16/2021 10:52 AM    CREATININE 0.7 12/16/2021 10:52 AM    CALCIUM 9.3 12/16/2021 10:52 AM    LABGLOM >90 12/16/2021 10:52 AM    GLUCOSE 80 12/16/2021 10:52 AM       Lab Results   Component Value Date/Time    ALKPHOS 83 12/16/2021 10:52 AM    ALT 46 12/16/2021 10:52 AM    AST 40 12/16/2021 10:52 AM    PROT 7.3 12/16/2021 10:52 AM    BILITOT 0.4 12/16/2021 10:52 AM    BILIDIR <0.2 01/13/2020 03:25 PM    LABALBU 4.7 12/16/2021 10:52 AM       No results found for: MG    No results found for: INR, PROTIME      Lab Results   Component Value Date/Time    LABA1C 4.8 12/16/2021 09:26 AM       Lab Results   Component Value Date/Time    HDL 40 07/06/2022 09:20 AM    LDLCALC 83 07/06/2022 09:20 AM       Lab Results   Component Value Date/Time    TSH 0.767 08/25/2022 09:27 AM         Testing Reviewed:      I haveindividually reviewed the below cardiac tests    EKG:    ECHO: No results found for this or any previous visit. STRESS:    CATH:    Assessment/Plan       Diagnosis Orders   1. Palpitation  Echo 2D w doppler w color complete        Palpitations  Dizziness  Anxiety  Insomnia    EKG is benign  Holter showed no arrhythmias  TFTs wnl  Will get Echo to evaluate for structural heart disease  Could consider beta blocker therapy on prn basis   The patient is asked to make an attempt to improve diet and exercise patterns to aid in medical management of this problem. Advised more plant based nutrition/meditarrean diet   Advised patient to call office or seek immediate medical attention if there is any new onset of  any chest pain, sob, palpitations, lightheadedness, dizziness, orthopnea, PND or pedal edema. All medication side effects were discussed in details. Thank youfor allowing me to participate in the care of this patient. Please do not hesitate to contact me for any further questions. Return in about 6 months (around 5/1/2023), or if symptoms worsen or fail to improve, for Regular follow up, Review testing.        Electronically signed by Vivian Morales MD [facility]  11/1/2022 at 8:31 AM EDT

## 2022-11-01 NOTE — PROGRESS NOTES
Pt C/O CP radiating in to neck, SOB at times, HA, and dizziness, HP, swelling in feet and ankles, fatigued      Pt denies

## 2022-11-02 RX ORDER — SEMAGLUTIDE 1.34 MG/ML
1 INJECTION, SOLUTION SUBCUTANEOUS WEEKLY
Qty: 4 ADJUSTABLE DOSE PRE-FILLED PEN SYRINGE | Refills: 2 | Status: SHIPPED | OUTPATIENT
Start: 2022-11-02

## 2022-11-03 ENCOUNTER — TELEPHONE (OUTPATIENT)
Dept: PULMONOLOGY | Age: 22
End: 2022-11-03

## 2022-11-03 NOTE — TELEPHONE ENCOUNTER
Spoke with patient, she reported that she is no longer interested in moving forward with Xywav. Phoned [facility] and canceled RX.

## 2022-11-08 ENCOUNTER — HOSPITAL ENCOUNTER (OUTPATIENT)
Dept: NON INVASIVE DIAGNOSTICS | Age: 22
Discharge: HOME | End: 2022-11-08
Payer: COMMERCIAL

## 2022-11-08 DIAGNOSIS — R00.2 PALPITATION: ICD-10-CM

## 2022-11-08 LAB
LV EF: 63 %
LVEF MODALITY: NORMAL

## 2022-11-08 PROCEDURE — 93306 TTE W/DOPPLER COMPLETE: CPT

## 2022-11-10 ENCOUNTER — OFFICE VISIT (OUTPATIENT)
Dept: PULMONOLOGY | Age: 22
End: 2022-11-10
Payer: COMMERCIAL

## 2022-11-10 VITALS
HEART RATE: 94 BPM | HEIGHT: 64 IN | BODY MASS INDEX: 38.93 KG/M2 | SYSTOLIC BLOOD PRESSURE: 122 MMHG | OXYGEN SATURATION: 98 % | WEIGHT: 228 LBS | DIASTOLIC BLOOD PRESSURE: 78 MMHG | TEMPERATURE: 98.8 F

## 2022-11-10 DIAGNOSIS — F41.0 GENERALIZED ANXIETY DISORDER WITH PANIC ATTACKS: ICD-10-CM

## 2022-11-10 DIAGNOSIS — F41.1 GENERALIZED ANXIETY DISORDER WITH PANIC ATTACKS: ICD-10-CM

## 2022-11-10 DIAGNOSIS — G47.10 HYPERSOMNIA: ICD-10-CM

## 2022-11-10 DIAGNOSIS — G47.09 OTHER INSOMNIA: Primary | ICD-10-CM

## 2022-11-10 PROCEDURE — 1036F TOBACCO NON-USER: CPT | Performed by: NURSE PRACTITIONER

## 2022-11-10 PROCEDURE — G8427 DOCREV CUR MEDS BY ELIG CLIN: HCPCS | Performed by: NURSE PRACTITIONER

## 2022-11-10 PROCEDURE — G8484 FLU IMMUNIZE NO ADMIN: HCPCS | Performed by: NURSE PRACTITIONER

## 2022-11-10 PROCEDURE — G8417 CALC BMI ABV UP PARAM F/U: HCPCS | Performed by: NURSE PRACTITIONER

## 2022-11-10 PROCEDURE — 99214 OFFICE O/P EST MOD 30 MIN: CPT | Performed by: NURSE PRACTITIONER

## 2022-11-10 RX ORDER — CLONAZEPAM 0.5 MG/1
TABLET ORAL
Qty: 90 TABLET | Refills: 2 | Status: SHIPPED | OUTPATIENT
Start: 2022-11-10 | End: 2022-12-10

## 2022-11-10 RX ORDER — QUETIAPINE FUMARATE 25 MG/1
25 TABLET, FILM COATED ORAL NIGHTLY
Qty: 30 TABLET | Refills: 3 | Status: SHIPPED | OUTPATIENT
Start: 2022-11-10

## 2022-11-10 NOTE — PROGRESS NOTES
[place] [facility] and [address]      Sleep Medicine clinic Follow up    Jo-Ann Hollins                                               Chief complaint and [place]: Jamir Chandler came for follow up regarding   Chief Complaint   Patient presents with    Follow-up     3wk hypersomnia f/u. Med check Klonopin     ESS:12  SAQLI  30     She is taking her medications with good compliace. Current Medications:  Klonopin 0.5 mg BID, on day 2 she felt good and did feel the medication was helping with the anxiety . Then the anxiety came right back despite use of Klonopin. Had panic attack yesterday at work  Still having palpitations / chest pain , did 1 day Holter monitor from PCP and then seen Cardiology . Recently had ECHO wnl   Was prescribed metoprolol tartrate 25 mg PO daily as needed when having chest pain/ palpitations. Has taken everyday , did not take today . She unsure if this has helped. Has been loosing wt intentionally, taking ozempic injections . She aslo practicing good sleep hygiene practices. Shedenies any new problems. No recent hospitalizations or ER visits. She denies any work related problems. No hypersomnolence. No driving issues. Review of Systems   General ROS: positive for  - night sweats, sleep disturbance,   weight loss , anxiety  Rest of ROS negative     Vitals: /78 (Site: Left Upper Arm, Position: Sitting, Cuff Size: Medium Adult)   Pulse 94   Temp 98.8 °F (37.1 °C) (Temporal)   Ht 5' 4\" (1.626 m)   Wt 228 lb (103.4 kg)   SpO2 98% Comment: r/a  BMI 39.14 kg/m²   Body mass index is 39.14 kg/m². Physical Exam  Vitals and nursing note reviewed. Constitutional:       Appearance: Normal appearance. She is obese. HENT:      Head: Normocephalic and atraumatic. Mouth/Throat:      Pharynx: Oropharynx is clear.    Eyes:      Conjunctiva/sclera: Conjunctivae normal.   Pulmonary:      Effort: Pulmonary effort is normal. No tachypnea, bradypnea or

## 2022-11-30 NOTE — TELEPHONE ENCOUNTER
Referred 10/18/2022 to [facility] Cardiology   [facility], [address]   [facility], [address]   [phone]

## 2022-12-01 ENCOUNTER — TELEPHONE (OUTPATIENT)
Dept: PULMONOLOGY | Age: 22
End: 2022-12-01

## 2022-12-01 DIAGNOSIS — F41.1 GENERALIZED ANXIETY DISORDER: ICD-10-CM

## 2022-12-01 DIAGNOSIS — R14.0 ABDOMINAL BLOATING: ICD-10-CM

## 2022-12-01 DIAGNOSIS — G47.11 IDIOPATHIC HYPERSOMNIA: ICD-10-CM

## 2022-12-01 DIAGNOSIS — R10.10 UPPER ABDOMINAL PAIN, UNSPECIFIED: Primary | ICD-10-CM

## 2022-12-01 NOTE — TELEPHONE ENCOUNTER
Patient called in complaining of stomach pain that has been causing her issues for a while now. She was wondering if you could place a referral for her to see a GI doctor at the place in [place]? Thanks!

## 2022-12-08 DIAGNOSIS — M79.672 FOOT PAIN, LEFT: Primary | ICD-10-CM

## 2022-12-08 DIAGNOSIS — R22.42 LOCALIZED SWELLING OF LEFT FOOT: ICD-10-CM

## 2022-12-15 ENCOUNTER — OFFICE VISIT (OUTPATIENT)
Dept: PULMONOLOGY | Age: 22
End: 2022-12-15
Payer: COMMERCIAL

## 2022-12-15 VITALS
DIASTOLIC BLOOD PRESSURE: 86 MMHG | HEART RATE: 94 BPM | TEMPERATURE: 98.1 F | SYSTOLIC BLOOD PRESSURE: 124 MMHG | HEIGHT: 64 IN | WEIGHT: 221 LBS | OXYGEN SATURATION: 99 % | BODY MASS INDEX: 37.73 KG/M2

## 2022-12-15 DIAGNOSIS — G47.10 HYPERSOMNIA: Primary | ICD-10-CM

## 2022-12-15 DIAGNOSIS — F41.1 GENERALIZED ANXIETY DISORDER WITH PANIC ATTACKS: ICD-10-CM

## 2022-12-15 DIAGNOSIS — E66.9 OBESITY (BMI 30-39.9): ICD-10-CM

## 2022-12-15 DIAGNOSIS — F41.0 GENERALIZED ANXIETY DISORDER WITH PANIC ATTACKS: ICD-10-CM

## 2022-12-15 DIAGNOSIS — R14.0 ABDOMINAL BLOATING: ICD-10-CM

## 2022-12-15 DIAGNOSIS — G47.09 OTHER INSOMNIA: ICD-10-CM

## 2022-12-15 PROCEDURE — 1036F TOBACCO NON-USER: CPT | Performed by: NURSE PRACTITIONER

## 2022-12-15 PROCEDURE — 99214 OFFICE O/P EST MOD 30 MIN: CPT | Performed by: NURSE PRACTITIONER

## 2022-12-15 PROCEDURE — G8417 CALC BMI ABV UP PARAM F/U: HCPCS | Performed by: NURSE PRACTITIONER

## 2022-12-15 PROCEDURE — G8482 FLU IMMUNIZE ORDER/ADMIN: HCPCS | Performed by: NURSE PRACTITIONER

## 2022-12-15 PROCEDURE — G8427 DOCREV CUR MEDS BY ELIG CLIN: HCPCS | Performed by: NURSE PRACTITIONER

## 2022-12-15 RX ORDER — LINACLOTIDE 145 UG/1
145 CAPSULE, GELATIN COATED ORAL DAILY PRN
COMMUNITY
Start: 2022-12-09

## 2022-12-15 ASSESSMENT — ENCOUNTER SYMPTOMS
VOMITING: 0
ABDOMINAL DISTENTION: 1
ABDOMINAL PAIN: 1
EYES NEGATIVE: 1
SHORTNESS OF BREATH: 0
NAUSEA: 0
DIARRHEA: 1
WHEEZING: 0
COUGH: 0

## 2022-12-15 NOTE — PROGRESS NOTES
[place] [facility] and [address]      Sleep Medicine clinic Follow up    Caryl Cardenas                                               Chief complaint and [place]: Edward came for follow up regarding   Chief Complaint   Patient presents with    Medication Check     1mo f/u, seroquel     SAQLI:33 ESS:15     She is taking her medications with good compliace. Current Medications:  Klonopin 0.5 mg PO TID, has not been taking TID. Mostly taking in AMs   Seroquel 25 mg PO nightly   Trying to get pregnant  Waking up with sore jaw , locking . Noticing clenching. Not wearing mouth guard   ESS still elevated, 15   \" Still very tired during the day\"  Seroquel has been helpful with staying asleep. Stays to strict bedtime routine. Goes to bed between 9-9:30 pm, takes up to 1 hour to fall asleep. Takes Seroquel at 8 pm   Anxiety not well controlled. Panic attacks have resolved, feels work is less stressful     does not report side effects from medication  GI complaints of abdominal pain/ seen GI , prescribed Linzess, has not started  Scheduled for colonoscopy and endoscopy . Per pt concerns of slow emptying      She aslo practicing good sleep hygiene practices. Shedenies any new problems. No recent hospitalizations or ER visits. She denies any work related problems. No hypersomnolence. No driving issues. Review of Systems       Review of Systems   Constitutional:  Positive for fatigue. Negative for activity change, appetite change, chills, fever and unexpected weight change. HENT: Negative. Eyes: Negative. Respiratory:  Negative for cough, shortness of breath and wheezing. Cardiovascular:  Negative for chest pain, palpitations and leg swelling. Gastrointestinal:  Positive for abdominal distention, abdominal pain and diarrhea. Negative for nausea and vomiting. Genitourinary: Negative. Musculoskeletal: Negative. Skin: Negative. Neurological: Negative.     Hematological: Negative. Psychiatric/Behavioral:  Positive for sleep disturbance. The patient is nervous/anxious. Vitals: /86 (Site: Left Upper Arm, Position: Sitting, Cuff Size: Medium Adult)   Pulse 94   Temp 98.1 °F (36.7 °C) (Temporal)   Ht 5' 4\" (1.626 m)   Wt 221 lb (100.2 kg)   SpO2 99% Comment: r/a  BMI 37.93 kg/m²   Body mass index is 37.93 kg/m². Physical Exam  Vitals and nursing note reviewed. Constitutional:       Appearance: Normal appearance. She is obese. HENT:      Head: Normocephalic and atraumatic. Mouth/Throat:      Pharynx: Oropharynx is clear. Eyes:      Conjunctiva/sclera: Conjunctivae normal.   Pulmonary:      Effort: Pulmonary effort is normal. No tachypnea, bradypnea or respiratory distress. Skin:     Findings: No erythema or rash. Neurological:      Mental Status: She is alert and oriented to person, place, and time. Psychiatric:         Attention and Perception: Attention normal.         Mood and Affect: Mood normal.         Speech: Speech normal.         Behavior: Behavior normal.         Thought Content: Thought content normal.         Cognition and Memory: Cognition normal.         Judgment: Judgment normal.        Diagnostic Data: PS/10/22 AHI:0.4    Assessment    Diagnosis Orders   1. Hypersomnia        2. Other insomnia        3. Generalized anxiety disorder with panic attacks        4. Obesity (BMI 30-39.9)        5. Abdominal bloating            Plan   -hypersomnia suboptimally controlled but is improved. Wishes to continue current therapies with Seroquel 25 mg PO nightly as this is helping with sleep at night.   -in process of work up with GI , not interested in starting any new meds until after GI work up in case of intolerance/ side effects of new meds .   -anxiety -improved, continue PRN xanax at currently prescribed dose.   Can take up to TID if uncontrolled during daytime.   -is trying to get pregnant, ok to resume meds now, please notify provider right away of positive pregnancy to review medications and safety with pregnancy      Follow up in Sleep Clinic 6 months  Bhupendra Deras educated about her condition and my plan. She verbalizes understanding.     billing based on medical decision making   Electronically signed by LASHAWN Bateman CNP on 12/15/2022 at 2:56 PM

## 2023-01-12 ENCOUNTER — TELEPHONE (OUTPATIENT)
Dept: PULMONOLOGY | Age: 23
End: 2023-01-12

## 2023-01-12 DIAGNOSIS — G47.09 OTHER INSOMNIA: ICD-10-CM

## 2023-01-12 DIAGNOSIS — F41.1 GENERALIZED ANXIETY DISORDER WITH PANIC ATTACKS: Primary | ICD-10-CM

## 2023-01-12 DIAGNOSIS — F41.0 GENERALIZED ANXIETY DISORDER WITH PANIC ATTACKS: Primary | ICD-10-CM

## 2023-01-12 RX ORDER — QUETIAPINE FUMARATE 25 MG/1
25 TABLET, FILM COATED ORAL NIGHTLY
Qty: 30 TABLET | Refills: 5 | Status: SHIPPED | OUTPATIENT
Start: 2023-01-12

## 2023-01-12 NOTE — TELEPHONE ENCOUNTER
Pt tried to get Seroquel at [address] but was unable. Please send script to [address] [place] [address] for her to pickup there.     Thanks, [employer]

## 2023-01-13 ENCOUNTER — NURSE ONLY (OUTPATIENT)
Dept: LAB | Age: 23
End: 2023-01-13

## 2023-01-13 LAB
PROGESTERONE LEVEL: 0.11 NG/ML
PROLACTIN: 8.2 NG/ML
TSH SERPL DL<=0.05 MIU/L-ACNC: 1.42 UIU/ML (ref 0.4–4.2)

## 2023-01-24 ENCOUNTER — HOSPITAL ENCOUNTER (OUTPATIENT)
Age: 23
Discharge: HOME | End: 2023-01-24
Payer: COMMERCIAL

## 2023-01-24 DIAGNOSIS — J02.9 SORETHROAT: Primary | ICD-10-CM

## 2023-01-24 DIAGNOSIS — J02.9 SORETHROAT: ICD-10-CM

## 2023-01-24 LAB
S PYO AG THROAT QL: NEGATIVE
S PYO THROAT QL CULT: NORMAL

## 2023-01-24 PROCEDURE — 87880 STREP A ASSAY W/OPTIC: CPT

## 2023-01-24 PROCEDURE — 87070 CULTURE OTHR SPECIMN AEROBIC: CPT

## 2023-01-26 LAB — BACTERIA THROAT AEROBE CULT: NORMAL

## 2023-02-27 ENCOUNTER — TELEPHONE (OUTPATIENT)
Dept: PULMONOLOGY | Age: 23
End: 2023-02-27

## 2023-02-27 RX ORDER — CYCLOBENZAPRINE HCL 10 MG
10 TABLET ORAL 3 TIMES DAILY PRN
Qty: 21 TABLET | Refills: 0 | Status: SHIPPED | OUTPATIENT
Start: 2023-02-27 | End: 2023-03-09

## 2023-02-27 NOTE — TELEPHONE ENCOUNTER
Patient requesting a muscle relaxer for pinched nerve in back. Any order can be sent to [facility] [address], [address] [place], [place] [address]   Phone:  [phone]  Fax:  [phone]. Please advise.

## 2023-03-08 ENCOUNTER — NURSE ONLY (OUTPATIENT)
Dept: LAB | Age: 23
End: 2023-03-08

## 2023-03-08 LAB — PROGEST SERPL-MCNC: 12.57 NG/ML

## 2023-03-23 DIAGNOSIS — E66.01 CLASS 3 SEVERE OBESITY WITH BODY MASS INDEX (BMI) OF 40.0 TO 44.9 IN ADULT, UNSPECIFIED OBESITY TYPE, UNSPECIFIED WHETHER SERIOUS COMORBIDITY PRESENT (HCC): Primary | ICD-10-CM

## 2023-03-23 RX ORDER — SEMAGLUTIDE 1.34 MG/ML
0.25 INJECTION, SOLUTION SUBCUTANEOUS WEEKLY
Qty: 4 ADJUSTABLE DOSE PRE-FILLED PEN SYRINGE | Refills: 3 | Status: SHIPPED | OUTPATIENT
Start: 2023-03-23

## 2023-04-20 RX ORDER — QUETIAPINE FUMARATE 25 MG/1
25 TABLET, FILM COATED ORAL NIGHTLY
COMMUNITY

## 2023-04-24 ENCOUNTER — NURSE ONLY (OUTPATIENT)
Dept: LAB | Age: 23
End: 2023-04-24

## 2023-04-24 LAB
BASOPHILS ABSOLUTE: 0.1 THOU/MM3 (ref 0–0.1)
BASOPHILS NFR BLD AUTO: 0.7 %
DEPRECATED RDW RBC AUTO: 39 FL (ref 35–45)
EOSINOPHIL NFR BLD AUTO: 1.5 %
EOSINOPHILS ABSOLUTE: 0.1 THOU/MM3 (ref 0–0.4)
ERYTHROCYTE [DISTWIDTH] IN BLOOD BY AUTOMATED COUNT: 11.3 % (ref 11.5–14.5)
HCT VFR BLD AUTO: 44.8 % (ref 37–47)
HGB BLD-MCNC: 14.6 GM/DL (ref 12–16)
IMM GRANULOCYTES # BLD AUTO: 0.03 THOU/MM3 (ref 0–0.07)
IMM GRANULOCYTES NFR BLD AUTO: 0.3 %
LYMPHOCYTES ABSOLUTE: 2.2 THOU/MM3 (ref 1–4.8)
LYMPHOCYTES NFR BLD AUTO: 25.1 %
MCH RBC QN AUTO: 30.5 PG (ref 26–33)
MCHC RBC AUTO-ENTMCNC: 32.6 GM/DL (ref 32.2–35.5)
MCV RBC AUTO: 93.7 FL (ref 81–99)
MONOCYTES ABSOLUTE: 0.4 THOU/MM3 (ref 0.4–1.3)
MONOCYTES NFR BLD AUTO: 5.2 %
NEUTROPHILS NFR BLD AUTO: 67.2 %
NRBC BLD AUTO-RTO: 0 /100 WBC
PLATELET # BLD AUTO: 270 THOU/MM3 (ref 130–400)
PMV BLD AUTO: 11.2 FL (ref 9.4–12.4)
RBC # BLD AUTO: 4.78 MILL/MM3 (ref 4.2–5.4)
SEGMENTED NEUTROPHILS ABSOLUTE COUNT: 5.8 THOU/MM3 (ref 1.8–7.7)
WBC # BLD AUTO: 8.6 THOU/MM3 (ref 4.8–10.8)

## 2023-04-25 ENCOUNTER — ANESTHESIA EVENT (OUTPATIENT)
Dept: OPERATING ROOM | Age: 23
End: 2023-04-25
Payer: COMMERCIAL

## 2023-04-25 ENCOUNTER — HOSPITAL ENCOUNTER (OUTPATIENT)
Age: 23
Setting detail: OUTPATIENT SURGERY
Discharge: HOME | End: 2023-04-25
Attending: OBSTETRICS & GYNECOLOGY | Admitting: OBSTETRICS & GYNECOLOGY
Payer: COMMERCIAL

## 2023-04-25 ENCOUNTER — ANESTHESIA (OUTPATIENT)
Dept: OPERATING ROOM | Age: 23
End: 2023-04-25
Payer: COMMERCIAL

## 2023-04-25 VITALS
WEIGHT: 232 LBS | RESPIRATION RATE: 19 BRPM | HEART RATE: 83 BPM | OXYGEN SATURATION: 94 % | SYSTOLIC BLOOD PRESSURE: 126 MMHG | DIASTOLIC BLOOD PRESSURE: 75 MMHG | TEMPERATURE: 96.9 F | HEIGHT: 64 IN | BODY MASS INDEX: 39.61 KG/M2

## 2023-04-25 DIAGNOSIS — R10.2 PELVIC PAIN: ICD-10-CM

## 2023-04-25 PROCEDURE — 2500000003 HC RX 250 WO HCPCS: Performed by: NURSE ANESTHETIST, CERTIFIED REGISTERED

## 2023-04-25 PROCEDURE — 88305 TISSUE EXAM BY PATHOLOGIST: CPT

## 2023-04-25 PROCEDURE — 6360000002 HC RX W HCPCS

## 2023-04-25 PROCEDURE — 2580000003 HC RX 258: Performed by: OBSTETRICS & GYNECOLOGY

## 2023-04-25 PROCEDURE — C1765 ADHESION BARRIER: HCPCS | Performed by: OBSTETRICS & GYNECOLOGY

## 2023-04-25 PROCEDURE — 7100000000 HC PACU RECOVERY - FIRST 15 MIN: Performed by: OBSTETRICS & GYNECOLOGY

## 2023-04-25 PROCEDURE — 7100000001 HC PACU RECOVERY - ADDTL 15 MIN: Performed by: OBSTETRICS & GYNECOLOGY

## 2023-04-25 PROCEDURE — 6360000002 HC RX W HCPCS: Performed by: OBSTETRICS & GYNECOLOGY

## 2023-04-25 PROCEDURE — 3600000012 HC SURGERY LEVEL 2 ADDTL 15MIN: Performed by: OBSTETRICS & GYNECOLOGY

## 2023-04-25 PROCEDURE — 7100000011 HC PHASE II RECOVERY - ADDTL 15 MIN: Performed by: OBSTETRICS & GYNECOLOGY

## 2023-04-25 PROCEDURE — 7100000010 HC PHASE II RECOVERY - FIRST 15 MIN: Performed by: OBSTETRICS & GYNECOLOGY

## 2023-04-25 PROCEDURE — 3600000002 HC SURGERY LEVEL 2 BASE: Performed by: OBSTETRICS & GYNECOLOGY

## 2023-04-25 PROCEDURE — 2500000003 HC RX 250 WO HCPCS: Performed by: OBSTETRICS & GYNECOLOGY

## 2023-04-25 PROCEDURE — 2720000010 HC SURG SUPPLY STERILE: Performed by: OBSTETRICS & GYNECOLOGY

## 2023-04-25 PROCEDURE — 6360000002 HC RX W HCPCS: Performed by: NURSE ANESTHETIST, CERTIFIED REGISTERED

## 2023-04-25 PROCEDURE — 3700000000 HC ANESTHESIA ATTENDED CARE: Performed by: OBSTETRICS & GYNECOLOGY

## 2023-04-25 PROCEDURE — 6370000000 HC RX 637 (ALT 250 FOR IP): Performed by: OBSTETRICS & GYNECOLOGY

## 2023-04-25 PROCEDURE — 3700000001 HC ADD 15 MINUTES (ANESTHESIA): Performed by: OBSTETRICS & GYNECOLOGY

## 2023-04-25 PROCEDURE — 2709999900 HC NON-CHARGEABLE SUPPLY: Performed by: OBSTETRICS & GYNECOLOGY

## 2023-04-25 RX ORDER — SODIUM CHLORIDE 0.9 % (FLUSH) 0.9 %
5-40 SYRINGE (ML) INJECTION EVERY 12 HOURS SCHEDULED
Status: DISCONTINUED | OUTPATIENT
Start: 2023-04-25 | End: 2023-04-25 | Stop reason: HOSPADM

## 2023-04-25 RX ORDER — LIDOCAINE HYDROCHLORIDE 20 MG/ML
INJECTION, SOLUTION EPIDURAL; INFILTRATION; INTRACAUDAL; PERINEURAL PRN
Status: DISCONTINUED | OUTPATIENT
Start: 2023-04-25 | End: 2023-04-25 | Stop reason: SDUPTHER

## 2023-04-25 RX ORDER — SODIUM CHLORIDE 9 MG/ML
INJECTION, SOLUTION INTRAVENOUS PRN
Status: DISCONTINUED | OUTPATIENT
Start: 2023-04-25 | End: 2023-04-25 | Stop reason: HOSPADM

## 2023-04-25 RX ORDER — MORPHINE SULFATE 2 MG/ML
2 INJECTION, SOLUTION INTRAMUSCULAR; INTRAVENOUS EVERY 5 MIN PRN
Status: DISCONTINUED | OUTPATIENT
Start: 2023-04-25 | End: 2023-04-25 | Stop reason: HOSPADM

## 2023-04-25 RX ORDER — LABETALOL HYDROCHLORIDE 5 MG/ML
10 INJECTION, SOLUTION INTRAVENOUS
Status: DISCONTINUED | OUTPATIENT
Start: 2023-04-25 | End: 2023-04-25 | Stop reason: HOSPADM

## 2023-04-25 RX ORDER — ONDANSETRON 2 MG/ML
INJECTION INTRAMUSCULAR; INTRAVENOUS PRN
Status: DISCONTINUED | OUTPATIENT
Start: 2023-04-25 | End: 2023-04-25 | Stop reason: SDUPTHER

## 2023-04-25 RX ORDER — OXYCODONE HYDROCHLORIDE 5 MG/1
10 TABLET ORAL EVERY 4 HOURS PRN
Status: DISCONTINUED | OUTPATIENT
Start: 2023-04-25 | End: 2023-04-25 | Stop reason: HOSPADM

## 2023-04-25 RX ORDER — ACETAMINOPHEN 500 MG
1000 TABLET ORAL EVERY 8 HOURS PRN
Status: DISCONTINUED | OUTPATIENT
Start: 2023-04-25 | End: 2023-04-25 | Stop reason: HOSPADM

## 2023-04-25 RX ORDER — ROCURONIUM BROMIDE 10 MG/ML
INJECTION, SOLUTION INTRAVENOUS PRN
Status: DISCONTINUED | OUTPATIENT
Start: 2023-04-25 | End: 2023-04-25 | Stop reason: SDUPTHER

## 2023-04-25 RX ORDER — SODIUM CHLORIDE, SODIUM LACTATE, POTASSIUM CHLORIDE, CALCIUM CHLORIDE 600; 310; 30; 20 MG/100ML; MG/100ML; MG/100ML; MG/100ML
INJECTION, SOLUTION INTRAVENOUS CONTINUOUS
Status: DISCONTINUED | OUTPATIENT
Start: 2023-04-25 | End: 2023-04-25 | Stop reason: HOSPADM

## 2023-04-25 RX ORDER — FENTANYL CITRATE 50 UG/ML
INJECTION, SOLUTION INTRAMUSCULAR; INTRAVENOUS PRN
Status: DISCONTINUED | OUTPATIENT
Start: 2023-04-25 | End: 2023-04-25 | Stop reason: SDUPTHER

## 2023-04-25 RX ORDER — FENTANYL CITRATE 50 UG/ML
50 INJECTION, SOLUTION INTRAMUSCULAR; INTRAVENOUS EVERY 5 MIN PRN
Status: DISCONTINUED | OUTPATIENT
Start: 2023-04-25 | End: 2023-04-25 | Stop reason: HOSPADM

## 2023-04-25 RX ORDER — SODIUM CHLORIDE, SODIUM LACTATE, POTASSIUM CHLORIDE, CALCIUM CHLORIDE 600; 310; 30; 20 MG/100ML; MG/100ML; MG/100ML; MG/100ML
INJECTION, SOLUTION INTRAVENOUS SEE ADMIN INSTRUCTIONS
Status: DISCONTINUED | OUTPATIENT
Start: 2023-04-25 | End: 2023-04-25 | Stop reason: HOSPADM

## 2023-04-25 RX ORDER — KETOROLAC TROMETHAMINE 30 MG/ML
30 INJECTION, SOLUTION INTRAMUSCULAR; INTRAVENOUS EVERY 6 HOURS
Status: DISCONTINUED | OUTPATIENT
Start: 2023-04-25 | End: 2023-04-25 | Stop reason: HOSPADM

## 2023-04-25 RX ORDER — ONDANSETRON 2 MG/ML
8 INJECTION INTRAMUSCULAR; INTRAVENOUS EVERY 8 HOURS PRN
Status: DISCONTINUED | OUTPATIENT
Start: 2023-04-25 | End: 2023-04-25 | Stop reason: HOSPADM

## 2023-04-25 RX ORDER — HYDROCODONE BITARTRATE AND ACETAMINOPHEN 5; 325 MG/1; MG/1
1 TABLET ORAL EVERY 6 HOURS PRN
Qty: 28 TABLET | Refills: 0 | Status: SHIPPED | OUTPATIENT
Start: 2023-04-25 | End: 2023-05-02

## 2023-04-25 RX ORDER — BUPIVACAINE HYDROCHLORIDE 5 MG/ML
INJECTION, SOLUTION PERINEURAL PRN
Status: DISCONTINUED | OUTPATIENT
Start: 2023-04-25 | End: 2023-04-25 | Stop reason: ALTCHOICE

## 2023-04-25 RX ORDER — MORPHINE SULFATE 2 MG/ML
INJECTION, SOLUTION INTRAMUSCULAR; INTRAVENOUS
Status: COMPLETED
Start: 2023-04-25 | End: 2023-04-25

## 2023-04-25 RX ORDER — IBUPROFEN 800 MG/1
800 TABLET ORAL EVERY 8 HOURS PRN
Status: DISCONTINUED | OUTPATIENT
Start: 2023-04-27 | End: 2023-04-25 | Stop reason: HOSPADM

## 2023-04-25 RX ORDER — SODIUM CHLORIDE 0.9 % (FLUSH) 0.9 %
5-40 SYRINGE (ML) INJECTION PRN
Status: DISCONTINUED | OUTPATIENT
Start: 2023-04-25 | End: 2023-04-25 | Stop reason: HOSPADM

## 2023-04-25 RX ORDER — KETOROLAC TROMETHAMINE 30 MG/ML
INJECTION, SOLUTION INTRAMUSCULAR; INTRAVENOUS PRN
Status: DISCONTINUED | OUTPATIENT
Start: 2023-04-25 | End: 2023-04-25 | Stop reason: SDUPTHER

## 2023-04-25 RX ORDER — DEXAMETHASONE SODIUM PHOSPHATE 10 MG/ML
INJECTION, EMULSION INTRAMUSCULAR; INTRAVENOUS PRN
Status: DISCONTINUED | OUTPATIENT
Start: 2023-04-25 | End: 2023-04-25 | Stop reason: SDUPTHER

## 2023-04-25 RX ORDER — OXYCODONE HYDROCHLORIDE 5 MG/1
5 TABLET ORAL EVERY 4 HOURS PRN
Status: DISCONTINUED | OUTPATIENT
Start: 2023-04-25 | End: 2023-04-25 | Stop reason: HOSPADM

## 2023-04-25 RX ADMIN — ROCURONIUM BROMIDE 35 MG: 10 INJECTION INTRAVENOUS at 12:08

## 2023-04-25 RX ADMIN — SODIUM CHLORIDE: 9 INJECTION, SOLUTION INTRAVENOUS at 14:16

## 2023-04-25 RX ADMIN — SODIUM CHLORIDE: 9 INJECTION, SOLUTION INTRAVENOUS at 12:04

## 2023-04-25 RX ADMIN — FENTANYL CITRATE 50 MCG: 50 INJECTION, SOLUTION INTRAMUSCULAR; INTRAVENOUS at 13:24

## 2023-04-25 RX ADMIN — KETOROLAC TROMETHAMINE 30 MG: 30 INJECTION, SOLUTION INTRAMUSCULAR at 12:37

## 2023-04-25 RX ADMIN — FENTANYL CITRATE 50 MCG: 50 INJECTION, SOLUTION INTRAMUSCULAR; INTRAVENOUS at 12:37

## 2023-04-25 RX ADMIN — MORPHINE SULFATE 2 MG: 2 INJECTION, SOLUTION INTRAMUSCULAR; INTRAVENOUS at 13:31

## 2023-04-25 RX ADMIN — OXYCODONE 5 MG: 5 TABLET ORAL at 14:14

## 2023-04-25 RX ADMIN — ROCURONIUM BROMIDE 5 MG: 10 INJECTION INTRAVENOUS at 12:55

## 2023-04-25 RX ADMIN — LIDOCAINE HYDROCHLORIDE 80 MG: 20 INJECTION, SOLUTION EPIDURAL; INFILTRATION; INTRACAUDAL; PERINEURAL at 12:08

## 2023-04-25 RX ADMIN — SUGAMMADEX 200 MG: 100 INJECTION, SOLUTION INTRAVENOUS at 13:08

## 2023-04-25 RX ADMIN — DEXAMETHASONE SODIUM PHOSPHATE 10 MG: 10 INJECTION, EMULSION INTRAMUSCULAR; INTRAVENOUS at 12:13

## 2023-04-25 RX ADMIN — PROPOFOL 160 MG: 10 INJECTION, EMULSION INTRAVENOUS at 12:08

## 2023-04-25 RX ADMIN — ROCURONIUM BROMIDE 10 MG: 10 INJECTION INTRAVENOUS at 12:37

## 2023-04-25 RX ADMIN — FENTANYL CITRATE 50 MCG: 50 INJECTION, SOLUTION INTRAMUSCULAR; INTRAVENOUS at 13:18

## 2023-04-25 RX ADMIN — ONDANSETRON 4 MG: 2 INJECTION INTRAMUSCULAR; INTRAVENOUS at 12:13

## 2023-04-25 RX ADMIN — FENTANYL CITRATE 50 MCG: 50 INJECTION, SOLUTION INTRAMUSCULAR; INTRAVENOUS at 13:20

## 2023-04-25 RX ADMIN — FENTANYL CITRATE 100 MCG: 50 INJECTION, SOLUTION INTRAMUSCULAR; INTRAVENOUS at 12:05

## 2023-04-25 ASSESSMENT — PAIN SCALES - GENERAL
PAINLEVEL_OUTOF10: 4
PAINLEVEL_OUTOF10: 6

## 2023-04-25 ASSESSMENT — PAIN - FUNCTIONAL ASSESSMENT: PAIN_FUNCTIONAL_ASSESSMENT: NONE - DENIES PAIN

## 2023-04-25 ASSESSMENT — PAIN DESCRIPTION - LOCATION
LOCATION: ABDOMEN
LOCATION: CHEST

## 2023-04-25 ASSESSMENT — PAIN DESCRIPTION - ORIENTATION: ORIENTATION: MID

## 2023-04-25 ASSESSMENT — PAIN DESCRIPTION - DESCRIPTORS: DESCRIPTORS: CRAMPING

## 2023-04-25 NOTE — PROGRESS NOTES
1320: Patient arrived to PACU. Report received from Georgina Fountain CRNA and CAITLIN Lancaster. Patient placed on cardiac monitor. Patient tearful upon arrival. Patient was given pain medication per CRNA. Pt states she is having chest heaviness. Surgical site x 3 clean, dry, intact. Beronica pad placed. No drainage noted. 1331: patient given dose of morphine. Rates pain 6/10. Pain is still located in chest area. 1335: Hardeep Duncan given report.

## 2023-04-25 NOTE — BRIEF OP NOTE
Brief Operative Report      Pre-operative Diagnosis:  pelvic pain    Post-operative Diagnosis:  Same    Procedure:  Dx L/S resection of endometriosis    Surgeon: RACHEL Ibrahim MD     Anesthesia:  General endotrachial anesthesia    Estimated blood loss:  Less than 50 ml     Findings: See Operative Dictation, Multiple areas of endometriosis posterior cul de sac on B/L uterosacral ligaments and midline    Complications:  none      See dictated operative report for full details.       Ravinder Haile MD

## 2023-04-25 NOTE — ANESTHESIA POSTPROCEDURE EVALUATION
Department of Anesthesiology  Postprocedure Note    Patient: Elías Cool  MRN: [id number]  YOB: 2000  Date of evaluation: 4/25/2023      Procedure Summary     Date: 04/25/23 Room / Location: [address] [place] [address] 01 / [facility]    Anesthesia Start: 3217 Anesthesia Stop: 1433    Procedure: Diagnotic Laparoscopy , Resection of Endometriosis Diagnosis:       Pelvic pain      (Pelvic pain [R10.2])    Surgeons: Cr Dale MD Responsible Provider: Herbert Horn MD    Anesthesia Type: MAC ASA Status: 2          Anesthesia Type: No value filed.     Lara Phase I: Lara Score: 9    Lara Phase II: Lara Score: 10      Anesthesia Post Evaluation    Patient location during evaluation: PACU  Patient participation: complete - patient participated  Level of consciousness: awake  Airway patency: patent  Nausea & Vomiting: no vomiting and no nausea  Complications: no  Cardiovascular status: hemodynamically stable  Respiratory status: acceptable  Hydration status: stable

## 2023-04-25 NOTE — DISCHARGE INSTRUCTIONS
DISCHARGE INSTRUCTIONS  D&C, Hysteroscopy  Dr. Macy Vizcaino Today  Do Not Consume Alcohol for 48 hours  Resume prescription medications as instructed  You should have someone with you tonight at home. DIET  Start with liquids - drink extra fluids the next 24-48 hours. Progress to soft diet as able    ACTIVITY  Rest for the remainder of the day  May begin to drive after 24 hours  May begin to lift over 10 lbs. today  May return to normal activities and work after 24 hours    [address]  May shower or bathe  No Tampons or intercourse until after 7 days  You should expect vaginal drainage for 7-10 days  Throat lozenges or spray will help sore throat    SPECIAL INSTRUCTIONS / MEDICATIONS:          CALL MY OFFICE FOR:  Heavy bleeding  Pain not relieved by medication  Foul smelling vaginal drainage      IF YOU NEED MEDICAL ATTENTION CALL YOUR PHYSICIAN AT HIS OFFICE, OR LEAVE A MESSAGE WITH THE ANSWERING SERVICE, OR GO TO THE NEAREST EMERGENCY ROOM. I ACKNOWLEDGE RECEIVING THESE INSTRUCTIONS AND UNDERSTAND THEM           Patient    Significant Other       . Sandra Chain Surgical Site Infections      How can we work together to prevent Surgical Site Infections? We would like to thank you for choosing [facility] for your Surgical Care. Below you will find helpful information on how we can work together to prevent Surgical Site Infections. What is a Surgical Site Infection (SSI)? A surgical site infection is an infection that occurs after surgery in the part of the body where the surgery took place. Most patients who have surgery do not develop an infection. However, infections develop in about 1 to 3 out of every 100 patients who have surgery.   Some of the common symptoms of a surgical site infection are:  Redness and pain around the area where you had surgery  Drainage of cloudy fluid from

## 2023-04-25 NOTE — PROGRESS NOTES
[address] RN. PATIENT ANXIOUS  ATTEMPTING TO REASSURE PATIENT. [address] CRAMPING BUT APPEARS TO BE RESTING MORE QUIETLY. 1346 RATES CRAMPING # 4   9975 O2 REMOVED AT THIS TIME. 1400 PULSE OX 96% ROOM AIR TRANSITIONED TO PHASE 2 . MOTHER AT BEDSIDE.  1405 WATER AND CRACKERS GIVEN. [facility] # 4 SEE MAR. ICE CREAM GIVEN PER REQUEST. 1430 RESTING. 1445  CRAMPING DISCOMFORT # 4 DISCHARGE INSTRUCTIONS GIVEN TO PATIENT AND MOTHER VERBALIZE UNDERSTANDING. 1455 ASSISTED TO BATHROOM VOIDED AND PERIPAD CHANGED FOR SMALL AMOUNT OF VAGINAL BLEEDING. [address] WITHOUT COMPLAINT. Les Pimple

## 2023-04-25 NOTE — ANESTHESIA PRE PROCEDURE
Department of Anesthesiology  Preprocedure Note       Name:  Ferne Angelucci   Age:  26 y.o.  :  2000                                          MRN:  [id number]         Date:  2023      Surgeon: Mabel Self):  Carlos Bravo MD    Procedure: Procedure(s):  Diagnotic Laparoscopy possible Resection of Endometriosis    Medications prior to admission:   Prior to Admission medications    Medication Sig Start Date End Date Taking? Authorizing Provider   QUEtiapine (SEROQUEL) 25 MG tablet Take 1 tablet by mouth nightly   Yes Historical Provider, MD   Prenatal MV & Min w/FA-DHA ([employer] PRENATAL GUMMY PO) Take by mouth daily   Yes Historical Provider, MD   methylphenidate (RITALIN) 10 MG tablet Take 1 tablet by mouth 2 times daily for 30 days.  Take 1 tablet in AM before breakfast, take 1 tablet at lunch time at least 4 hours apart Max Daily Amount: 20 mg 23  LASHAWN Centeno - CNP   Semaglutide,0.25 or 0.5MG/DOS, (OZEMPIC, 0.25 OR 0.5 MG/DOSE,) 2 MG/1.5ML SOPN Inject 0.25 mg into the skin once a week 3/23/23   Zane Collins MD       Current medications:    Current Facility-Administered Medications   Medication Dose Route Frequency Provider Last Rate Last Admin    lactated ringers IV soln infusion   IntraVENous Continuous Carlos Bravo MD        sodium chloride flush 0.9 % injection 5-40 mL  5-40 mL IntraVENous 2 times per day Carlos Bravo MD        sodium chloride flush 0.9 % injection 5-40 mL  5-40 mL IntraVENous PRN Carlos Bravo MD        0.9 % sodium chloride infusion   IntraVENous PRN Carlos Bravo MD        ondansetron [facility]) injection 8 mg  8 mg IntraVENous Q8H PRN Carlos Bravo MD           Allergies:  No Known Allergies    Problem List:    Patient Active Problem List   Diagnosis Code    Infective otitis externa H60.399    Cervical adenopathy R59.0    Nasal obstruction J34.89    Hypertrophy of nasal turbinates J34.3    Deviated nasal septum J34.2    Enlarged tonsils and

## 2023-04-25 NOTE — H&P
[address] [address]  History and Physical Update    Pt Name: Tony Nunez  MRN: [id number]  YOB: 2000  Date of evaluation: 4/25/2023    [x] I have examined the patient and reviewed the H&P/Consult and there are no changes to the patient or plans.  Plan for Dx L/S possible resection of endometriosis      [] I have examined the patient and reviewed the H&P/Consult and have noted the following changes:            Alcon Alberts MD,MD  Electronically signed 4/25/2023 at 11:55 AM

## 2023-04-27 NOTE — OP NOTE
[address] [place] [address]                                OPERATIVE REPORT    PATIENT NAME: Alexandre Juan                   :        2000  MED REC NO:   [id number]                           ROOM:  ACCOUNT NO:   [de-identified]                           ADMIT DATE: 2023  PROVIDER:     Cassia Phillip M.D. Chilton Rump:  2023    PREOPERATIVE DIAGNOSES:  1.  Pelvic pain. 2.  Suspected endometriosis. POSTOPERATIVE DIAGNOSES:  1.  Pelvic pain. 2.  Confirmed endometriosis. PROCEDURE:  Diagnostic laparoscopy with excision of endometriosis. ANESTHESIA:  General.    COMPLICATIONS:  None. EBL:  Less than 50. FINDINGS:  Extensive endometriosis on posterior cul-de-sac from  uterosacral to uterosacral and in the midline, also extending over the  bilateral ureters and the lower pelvis. No evidence of endometriosis in  the ovarian fossa or anterior cul-de-sac. Normal-appearing adnexa,  normal fallopian tubes with adequate spill. DESCRIPTION OF PROCEDURE:  The patient was taken to the operating room  where general anesthesia was found to be adequate. She was prepped and  draped in dorsal lithotomy position with the Fred stirrudylan. Bladder was drained prior to procedure and weighted speculum was placed  in vagina. Cervix was grasped with tenaculum. It was sounded and  dilated to accommodate uterine manipulator, which was placed and  attention was turned to the abdomen. The umbilical skin was injected  with Marcaine and incised with a scalpel and a 5-mm trocar was placed  directly into the abdomen. It was insufflated with CO2 gas. The second  trocar was placed suprapubic and third in the left lower quadrant. The  above findings were noted and the monopolar scissors was used to excise  the endometriosis implants from uterosacral to uterosacral and in the  midline.   However, the areas over the

## 2023-04-28 DIAGNOSIS — E66.01 CLASS 3 SEVERE OBESITY WITH BODY MASS INDEX (BMI) OF 40.0 TO 44.9 IN ADULT, UNSPECIFIED OBESITY TYPE, UNSPECIFIED WHETHER SERIOUS COMORBIDITY PRESENT (HCC): ICD-10-CM

## 2023-05-03 RX ORDER — SEMAGLUTIDE 1.34 MG/ML
0.5 INJECTION, SOLUTION SUBCUTANEOUS WEEKLY
Qty: 1 ADJUSTABLE DOSE PRE-FILLED PEN SYRINGE | Refills: 3 | Status: SHIPPED | OUTPATIENT
Start: 2023-05-03 | End: 2023-05-18 | Stop reason: SDUPTHER

## 2023-05-05 ENCOUNTER — TELEPHONE (OUTPATIENT)
Dept: PULMONOLOGY | Age: 23
End: 2023-05-05

## 2023-05-05 DIAGNOSIS — F41.1 GENERALIZED ANXIETY DISORDER WITH PANIC ATTACKS: Primary | ICD-10-CM

## 2023-05-05 DIAGNOSIS — G47.11 IDIOPATHIC HYPERSOMNIA: ICD-10-CM

## 2023-05-05 DIAGNOSIS — F41.0 GENERALIZED ANXIETY DISORDER WITH PANIC ATTACKS: Primary | ICD-10-CM

## 2023-05-05 DIAGNOSIS — G47.09 OTHER INSOMNIA: ICD-10-CM

## 2023-05-05 RX ORDER — QUETIAPINE FUMARATE 25 MG/1
75 TABLET, FILM COATED ORAL NIGHTLY
Qty: 90 TABLET | Refills: 3 | Status: SHIPPED | OUTPATIENT
Start: 2023-05-05

## 2023-05-05 NOTE — TELEPHONE ENCOUNTER
Received refill request for Seroquel 75mg. Medication was last ordered by Renetta Dorsey. Medication was last ordered on 1/12/23 with 5 refills. Patient was last seen in the office 12/15/22. Does patient have a scheduled follow up?: yes - 6/16/23. Medication needs to be sent to [facility] [address], [address] [place] [address]   Phone:  [phone]  Fax:  [phone]. **PT REQUESTING 75MG    Thank you, please advise!     Patient's Allergies:  No Known Allergies

## 2023-05-05 NOTE — TELEPHONE ENCOUNTER
Med refill sent for increased dose .  I spoke to patient to confirm dosing and notified her of refill submit to [facility] outpatient pharmacy

## 2023-05-17 DIAGNOSIS — E66.01 CLASS 3 SEVERE OBESITY WITH BODY MASS INDEX (BMI) OF 40.0 TO 44.9 IN ADULT, UNSPECIFIED OBESITY TYPE, UNSPECIFIED WHETHER SERIOUS COMORBIDITY PRESENT (HCC): ICD-10-CM

## 2023-05-17 NOTE — TELEPHONE ENCOUNTER
Message from . Ozempic. Spoke with pt. Pt agrees to use [facility] pharmacy for this medication.

## 2023-05-17 NOTE — TELEPHONE ENCOUNTER
Document  FW: Romain Velazquez MD   Sent: Wed May 17, 2023 12:08 PM   To: [address] Staff                Message    Please let patient know that prior auth wasn't denied but that we need to send her medication to [facility] for coverage. Please let me know if she is ok with that.  Thanks!   ----- Message -----   From: Jing Brown   Sent: 5/16/2023  11:26 AM EDT   To: Moy López MD   Subject: Faiza Carrillo                                               The scan below was edited by Jing Brown [[id number]] on 5/16/2023 at 11:26 AM; it is attached to the following: Abstract on 5/16/2023 with Moy López Md.

## 2023-05-18 RX ORDER — SEMAGLUTIDE 1.34 MG/ML
0.5 INJECTION, SOLUTION SUBCUTANEOUS WEEKLY
Qty: 1 ADJUSTABLE DOSE PRE-FILLED PEN SYRINGE | Refills: 3 | Status: SHIPPED | OUTPATIENT
Start: 2023-05-18

## 2023-05-21 ENCOUNTER — PATIENT MESSAGE (OUTPATIENT)
Dept: PULMONOLOGY | Age: 23
End: 2023-05-21

## 2023-05-21 DIAGNOSIS — G47.11 IDIOPATHIC HYPERSOMNIA: Primary | ICD-10-CM

## 2023-05-23 RX ORDER — METHYLPHENIDATE HYDROCHLORIDE 10 MG/1
10 TABLET ORAL 2 TIMES DAILY
Qty: 60 TABLET | Refills: 0 | Status: SHIPPED | OUTPATIENT
Start: 2023-05-23 | End: 2023-06-22

## 2023-06-26 ENCOUNTER — PATIENT MESSAGE (OUTPATIENT)
Dept: FAMILY MEDICINE CLINIC | Age: 23
End: 2023-06-26

## 2023-06-26 DIAGNOSIS — R11.0 NAUSEA: ICD-10-CM

## 2023-06-27 RX ORDER — ONDANSETRON 4 MG/1
4 TABLET, ORALLY DISINTEGRATING ORAL EVERY 8 HOURS PRN
Qty: 20 TABLET | Refills: 1 | Status: SHIPPED | OUTPATIENT
Start: 2023-06-27

## 2023-07-07 DIAGNOSIS — G47.11 IDIOPATHIC HYPERSOMNIA: Primary | ICD-10-CM

## 2023-07-07 RX ORDER — METHYLPHENIDATE HYDROCHLORIDE 10 MG/1
10 TABLET ORAL 2 TIMES DAILY
COMMUNITY
End: 2023-07-10 | Stop reason: SDUPTHER

## 2023-07-10 RX ORDER — METHYLPHENIDATE HYDROCHLORIDE 10 MG/1
10 TABLET ORAL 2 TIMES DAILY
Qty: 60 TABLET | Refills: 0 | Status: SHIPPED | OUTPATIENT
Start: 2023-07-10 | End: 2023-08-09

## 2023-07-14 ENCOUNTER — OFFICE VISIT (OUTPATIENT)
Dept: PULMONOLOGY | Age: 23
End: 2023-07-14
Payer: COMMERCIAL

## 2023-07-14 VITALS
HEIGHT: 64 IN | DIASTOLIC BLOOD PRESSURE: 80 MMHG | BODY MASS INDEX: 38.07 KG/M2 | TEMPERATURE: 97.7 F | HEART RATE: 104 BPM | OXYGEN SATURATION: 98 % | WEIGHT: 223 LBS | SYSTOLIC BLOOD PRESSURE: 122 MMHG

## 2023-07-14 DIAGNOSIS — F41.0 GENERALIZED ANXIETY DISORDER WITH PANIC ATTACKS: ICD-10-CM

## 2023-07-14 DIAGNOSIS — G47.10 HYPERSOMNIA: ICD-10-CM

## 2023-07-14 DIAGNOSIS — E66.09 CLASS 2 OBESITY DUE TO EXCESS CALORIES WITHOUT SERIOUS COMORBIDITY WITH BODY MASS INDEX (BMI) OF 38.0 TO 38.9 IN ADULT: ICD-10-CM

## 2023-07-14 DIAGNOSIS — G47.11 IDIOPATHIC HYPERSOMNIA: Primary | ICD-10-CM

## 2023-07-14 DIAGNOSIS — F41.1 GENERALIZED ANXIETY DISORDER WITH PANIC ATTACKS: ICD-10-CM

## 2023-07-14 PROCEDURE — 99214 OFFICE O/P EST MOD 30 MIN: CPT | Performed by: NURSE PRACTITIONER

## 2023-07-14 NOTE — PROGRESS NOTES
[facility] and [address]                                         [id number]  7/14/2023   Chief Complaint   Patient presents with    Follow-up     Insomnia 6 month f/u med check. Pt of Dr. Gila Garrett 9 ( previously 15 )   SAQLI 48 ( previously 35 )     Presentation:   Luis presents for sleep medicine follow up for insomnia/ hypersomnia   Since the last visit Luis has been doing reasonably well with Ritalin 10 mg PO BID , Seroquel 75 mg PO nightly   Is following good sleep practices. Symptoms of both her hypersomnia and insomnia  are improved    BH INSOMNIA ASSESSMENT:   Reported degree of insomnia: mild (symptoms are mild with medicaiton / severe without meds)  Insomnia episode progress: improving  Sleep patterns during the week:     Bedroom environment: comfortable and dark      Number of nighttime awakenings: none    Difficult to arouse in the morning: No      Working off-shift hours: No     Progress History:   Since last visit any new medical issues? No  New ER or hospitlal visits? No  Any new or changes in medicines? No  Any new sleep medicines? No    Sleep issues:  Do you feel better? YES   More rested? Yes   Better concentration?  yes      Past Medical History:   Diagnosis Date    Anxiety     Environmental allergies     dogs, dust mites, molds and smuts, weeds, trees    Impingement syndrome involving patellar fat pad of left knee 2021    Other insomnia     PCOS (polycystic ovarian syndrome)     Seasonal allergies     Severe episode of recurrent major depressive disorder, without psychotic features ([address] [facility]) 07/19/2022       Past Surgical History:   Procedure Laterality Date    CYST INCISION AND DRAINAGE  10/10/2020    pilonidal cyst-- [facility]    LAPAROSCOPY N/A 4/25/2023    Diagnotic Laparoscopy , Resection of Endometriosis performed by Khurram Easley MD at [address] [place] [address]       20mos old-

## 2023-07-25 DIAGNOSIS — E66.01 CLASS 3 SEVERE OBESITY WITH BODY MASS INDEX (BMI) OF 40.0 TO 44.9 IN ADULT, UNSPECIFIED OBESITY TYPE, UNSPECIFIED WHETHER SERIOUS COMORBIDITY PRESENT (HCC): ICD-10-CM

## 2023-07-27 DIAGNOSIS — E66.01 CLASS 3 SEVERE OBESITY WITH BODY MASS INDEX (BMI) OF 40.0 TO 44.9 IN ADULT, UNSPECIFIED OBESITY TYPE, UNSPECIFIED WHETHER SERIOUS COMORBIDITY PRESENT (HCC): ICD-10-CM

## 2023-07-27 RX ORDER — SEMAGLUTIDE 0.68 MG/ML
INJECTION, SOLUTION SUBCUTANEOUS
Qty: 3 ML | Refills: 3 | Status: SHIPPED | OUTPATIENT
Start: 2023-07-27 | End: 2023-07-27 | Stop reason: SDUPTHER

## 2023-07-31 RX ORDER — SEMAGLUTIDE 0.68 MG/ML
0.5 INJECTION, SOLUTION SUBCUTANEOUS WEEKLY
Qty: 9 ML | Refills: 0 | Status: SHIPPED | OUTPATIENT
Start: 2023-07-31 | End: 2023-10-17

## 2023-08-08 ENCOUNTER — TELEPHONE (OUTPATIENT)
Dept: FAMILY MEDICINE CLINIC | Age: 23
End: 2023-08-08

## 2023-08-20 ENCOUNTER — TELEPHONE (OUTPATIENT)
Dept: FAMILY MEDICINE CLINIC | Age: 23
End: 2023-08-20

## 2023-08-20 DIAGNOSIS — Z00.00 WELL ADULT EXAM: Primary | ICD-10-CM

## 2023-08-21 NOTE — TELEPHONE ENCOUNTER
Note sent to me via [employer]: \"I just received my new secondary insurance card and uploaded it into my chart. I work the same hours as your office is open so if someone in your office could make me an appointment with Emely Amor on a Friday at or around 1:45pm I will be there. The only Friday I cannot do is 9/22/23. Thanks! \" Please call patient and help her get scheduled. I have placed orders for BeWell A1c and fasting lipid panel for her to get done. Thank you!     Electronically signed by John Madrid DO on 8/20/2023 at 8:24 PM

## 2023-08-21 NOTE — TELEPHONE ENCOUNTER
Regarding: Lab work  Contact: [phone]  Also I was wondering if my BeWell could be completed at the visit also? If so I could complete my lab work at [facility] this upcoming week if ordered.  Thanks!!

## 2023-08-22 ENCOUNTER — NURSE ONLY (OUTPATIENT)
Dept: LAB | Age: 23
End: 2023-08-22

## 2023-08-22 DIAGNOSIS — Z00.00 WELL ADULT EXAM: ICD-10-CM

## 2023-08-22 LAB
CHOLESTEROL, FASTING: 170 MG/DL (ref 100–199)
DEPRECATED MEAN GLUCOSE BLD GHB EST-ACNC: 84 MG/DL (ref 70–126)
HBA1C MFR BLD HPLC: 4.8 % (ref 4.4–6.4)
HCG INTACT+B SERPL-ACNC: < 1 MIU/ML (ref 0–5)
HDLC SERPL-MCNC: 41 MG/DL
LDLC SERPL CALC-MCNC: 99 MG/DL
TRIGLYCERIDE, FASTING: 151 MG/DL (ref 0–199)

## 2023-08-23 NOTE — RESULT ENCOUNTER NOTE
A1c and lipid panel normal. Patient has appt with Dr Trini Piña Friday 8/25 and can discuss any further questions/concerns. Thanks.     Electronically signed by Chico Esteban DO on 8/22/2023 at 10:43 PM

## 2023-08-24 ENCOUNTER — TELEPHONE (OUTPATIENT)
Dept: FAMILY MEDICINE CLINIC | Age: 23
End: 2023-08-24

## 2023-08-24 ASSESSMENT — PATIENT HEALTH QUESTIONNAIRE - PHQ9
3. TROUBLE FALLING OR STAYING ASLEEP: 3
5. POOR APPETITE OR OVEREATING: NEARLY EVERY DAY
8. MOVING OR SPEAKING SO SLOWLY THAT OTHER PEOPLE COULD HAVE NOTICED. OR THE OPPOSITE, BEING SO FIGETY OR RESTLESS THAT YOU HAVE BEEN MOVING AROUND A LOT MORE THAN USUAL: 0
SUM OF ALL RESPONSES TO PHQ QUESTIONS 1-9: 13
8. MOVING OR SPEAKING SO SLOWLY THAT OTHER PEOPLE COULD HAVE NOTICED. OR THE OPPOSITE - BEING SO FIDGETY OR RESTLESS THAT YOU HAVE BEEN MOVING AROUND A LOT MORE THAN USUAL: NOT AT ALL
2. FEELING DOWN, DEPRESSED OR HOPELESS: SEVERAL DAYS
SUM OF ALL RESPONSES TO PHQ QUESTIONS 1-9: 13
6. FEELING BAD ABOUT YOURSELF - OR THAT YOU ARE A FAILURE OR HAVE LET YOURSELF OR YOUR FAMILY DOWN: 2
6. FEELING BAD ABOUT YOURSELF - OR THAT YOU ARE A FAILURE OR HAVE LET YOURSELF OR YOUR FAMILY DOWN: MORE THAN HALF THE DAYS
4. FEELING TIRED OR HAVING LITTLE ENERGY: 3
7. TROUBLE CONCENTRATING ON THINGS, SUCH AS READING THE NEWSPAPER OR WATCHING TELEVISION: 0
9. THOUGHTS THAT YOU WOULD BE BETTER OFF DEAD, OR OF HURTING YOURSELF: NOT AT ALL
4. FEELING TIRED OR HAVING LITTLE ENERGY: NEARLY EVERY DAY
1. LITTLE INTEREST OR PLEASURE IN DOING THINGS: 1
9. THOUGHTS THAT YOU WOULD BE BETTER OFF DEAD, OR OF HURTING YOURSELF: 0
SUM OF ALL RESPONSES TO PHQ9 QUESTIONS 1 & 2: 2
2. FEELING DOWN, DEPRESSED OR HOPELESS: 1
5. POOR APPETITE OR OVEREATING: 3
10. IF YOU CHECKED OFF ANY PROBLEMS, HOW DIFFICULT HAVE THESE PROBLEMS MADE IT FOR YOU TO DO YOUR WORK, TAKE CARE OF THINGS AT HOME, OR GET ALONG WITH OTHER PEOPLE: 1
1. LITTLE INTEREST OR PLEASURE IN DOING THINGS: SEVERAL DAYS
10. IF YOU CHECKED OFF ANY PROBLEMS, HOW DIFFICULT HAVE THESE PROBLEMS MADE IT FOR YOU TO DO YOUR WORK, TAKE CARE OF THINGS AT HOME, OR GET ALONG WITH OTHER PEOPLE: SOMEWHAT DIFFICULT
7. TROUBLE CONCENTRATING ON THINGS, SUCH AS READING THE NEWSPAPER OR WATCHING TELEVISION: NOT AT ALL
SUM OF ALL RESPONSES TO PHQ QUESTIONS 1-9: 13
3. TROUBLE FALLING OR STAYING ASLEEP: NEARLY EVERY DAY
SUM OF ALL RESPONSES TO PHQ QUESTIONS 1-9: 13
SUM OF ALL RESPONSES TO PHQ QUESTIONS 1-9: 13

## 2023-08-25 ENCOUNTER — OFFICE VISIT (OUTPATIENT)
Dept: FAMILY MEDICINE CLINIC | Age: 23
End: 2023-08-25
Payer: COMMERCIAL

## 2023-08-25 VITALS
HEIGHT: 64 IN | SYSTOLIC BLOOD PRESSURE: 122 MMHG | DIASTOLIC BLOOD PRESSURE: 84 MMHG | RESPIRATION RATE: 16 BRPM | TEMPERATURE: 98.1 F | BODY MASS INDEX: 38.1 KG/M2 | HEART RATE: 114 BPM | WEIGHT: 223.2 LBS | OXYGEN SATURATION: 99 %

## 2023-08-25 DIAGNOSIS — Z00.00 WELL ADULT EXAM: Primary | ICD-10-CM

## 2023-08-25 DIAGNOSIS — E88.81 METABOLIC SYNDROME: ICD-10-CM

## 2023-08-25 DIAGNOSIS — R00.0 TACHYCARDIA: ICD-10-CM

## 2023-08-25 DIAGNOSIS — E66.01 CLASS 3 SEVERE OBESITY WITH BODY MASS INDEX (BMI) OF 40.0 TO 44.9 IN ADULT, UNSPECIFIED OBESITY TYPE, UNSPECIFIED WHETHER SERIOUS COMORBIDITY PRESENT (HCC): ICD-10-CM

## 2023-08-25 PROCEDURE — 99395 PREV VISIT EST AGE 18-39: CPT

## 2023-08-25 RX ORDER — SEMAGLUTIDE 0.68 MG/ML
0.5 INJECTION, SOLUTION SUBCUTANEOUS WEEKLY
Qty: 9 ML | Refills: 0 | Status: SHIPPED | OUTPATIENT
Start: 2023-08-25 | End: 2023-08-25

## 2023-08-25 RX ORDER — METOPROLOL SUCCINATE 50 MG/1
50 TABLET, EXTENDED RELEASE ORAL DAILY
Qty: 30 TABLET | Refills: 1 | Status: SHIPPED | OUTPATIENT
Start: 2023-08-25

## 2023-08-25 SDOH — ECONOMIC STABILITY: INCOME INSECURITY: HOW HARD IS IT FOR YOU TO PAY FOR THE VERY BASICS LIKE FOOD, HOUSING, MEDICAL CARE, AND HEATING?: NOT HARD AT ALL

## 2023-08-25 SDOH — ECONOMIC STABILITY: FOOD INSECURITY: WITHIN THE PAST 12 MONTHS, YOU WORRIED THAT YOUR FOOD WOULD RUN OUT BEFORE YOU GOT MONEY TO BUY MORE.: NEVER TRUE

## 2023-08-25 SDOH — ECONOMIC STABILITY: FOOD INSECURITY: WITHIN THE PAST 12 MONTHS, THE FOOD YOU BOUGHT JUST DIDN'T LAST AND YOU DIDN'T HAVE MONEY TO GET MORE.: NEVER TRUE

## 2023-08-25 SDOH — ECONOMIC STABILITY: HOUSING INSECURITY
IN THE LAST 12 MONTHS, WAS THERE A TIME WHEN YOU DID NOT HAVE A STEADY PLACE TO SLEEP OR SLEPT IN A SHELTER (INCLUDING NOW)?: NO

## 2023-08-25 NOTE — PROGRESS NOTES
Attending Physician Note    I saw and evaluated the patient, performing the key elements of the service. I discussed the findings, assessment and plan with the resident and agree with the resident's findings and plan as documented in the resident's note. GC modifier added. Brief summary:  Wellness - Pap due 2025. Working on weight loss. Reviewed labs. Metabolic syndrome, obesity - seeing benefit with semaglutide treatment. Insurance not covering for obesity, but she does have metabolic syndrome also. Increase dose to 1mg weekly. Resting sinus tachycardia - HM, echo done previously and normal.  Metoprolol XL 50mg 1/2-1 tab daily. Limit caffeine intake. Mental health managed by psychiatrist - pt feels she is doing OK in this regard.
Health Maintenance Due   Topic Date Due    HIV screen  Never done    Chlamydia/GC screen  Never done    Hepatitis C screen  Never done    COVID-19 Vaccine (3 - Booster for Moderna series) 02/28/2022    Flu vaccine (1) 08/01/2023    DTaP/Tdap/Td vaccine (7 - Td or Tdap) 08/07/2023
2001, 06/14/2002    Influenza A (E7E4-88) Vaccine Nasal 12/15/2009    Influenza Virus Vaccine 11/12/2021, 11/12/2021    Influenza, FLUCELVAX, (age 10 mo+), MDCK, PF, 0.5mL 10/30/2020    Influenza, Triv, 3 Years and older, IM (Afluria (5 yrs and older) 11/30/2022    MMR, David Macias, M-M-R II, (age 12m+), SC, 0.5mL 06/14/2002, 07/17/2006    Meningococcal ACWY, MENACTRA (MenACWY-D), (age 10m-48y), IM, 0.5mL 08/07/2013    Meningococcal ACWY, MENVEO (MenACWY-CRM), (age 3m-50y), IM, 0.5mL 08/23/2018    Meningococcal B, BEXSERO, (age 8y-23y), IM, 0.5mL 08/23/2018    Poliovirus, IPOL, (age 6w+), SC/IM, 0.5mL 2001, 2001, 08/16/2002, 07/17/2006    TDaP, ADACEL (age 6y-58y), BOOSTRIX (age 10y+), IM, 0.5mL 08/07/2013    Varicella, VARIVAX, (age 15m+), SC, 0.5mL 09/18/2020, 12/23/2020       Health Maintenance Due   Topic Date Due    HIV screen  Never done    Chlamydia/GC screen  Never done    Hepatitis C screen  Never done    COVID-19 Vaccine (3 - Booster for Moderna series) 02/28/2022    Flu vaccine (1) 08/01/2023    DTaP/Tdap/Td vaccine (7 - Td or Tdap) 08/07/2023       Food Insecurity: No Food Insecurity    Worried About Running Out of Food in the Last Year: Never true    Ran Out of Food in the Last Year: Never true       Assessment / Plan:      Diagnosis Orders   1. Well adult exam        2. Metabolic syndrome  Semaglutide, 1 MG/DOSE, 4 MG/3ML SOPN    DISCONTINUED: Semaglutide,0.25 or 0.5MG/DOS, (OZEMPIC, 0.25 OR 0.5 MG/DOSE,) 2 MG/3ML SOPN      3. Tachycardia  metoprolol succinate (TOPROL XL) 50 MG extended release tablet      4. Class 3 severe obesity with body mass index (BMI) of 40.0 to 44.9 in adult, unspecified obesity type, unspecified whether serious comorbidity present [facility])          Patient qualifies metabolic syndrome due to previous hypertension, elevated triglycerides and low HDL. We will increase semaglutide to 1 mg weekly  Start metoprolol XL 50 mg daily.   Patient to start at 25 mg daily to see

## 2023-08-25 NOTE — PATIENT INSTRUCTIONS
Thank you   Thank you for trusting us with your healthcare needs. You may receive a survey regarding today's visit. It would help us out if you would take a few moments to provide your feedback. We value your input. Please bring in ALL medications BOTTLES, including inhalers, herbal supplements, over the counter, prescribed & non-prescribed medicine. The office would like actual medication bottles and a list.   Please note our OFFICE POLICIES:   Prior to getting your labs drawn, please check with your insurance company for benefits and eligibility of lab services. Often, insurance companies cover certain tests for preventative visits only. It is patient's responsibility to see what is covered. We are unable to change a diagnosis after the test has been performed. Please hold onto your original lab orders and take them to your lab to be completed. If you no show your scheduled appointment three times, you will be dismissed from this practice. Reschedules must be completed 24 hours prior to your schedule appointment. If the list below has been completed, PLEASE FAX RECORDS TO OUR OFFICE @ [phone].  Once the records have been received we will update your records at our office:  Health Maintenance Due   Topic Date Due    HIV screen  Never done    [place] screen  Never done    Hepatitis C screen  Never done    COVID-19 Vaccine (3 - Booster for Moderna series) 02/28/2022    Flu vaccine (1) 08/01/2023    DTaP/Tdap/Td vaccine (7 - Td or Tdap) 08/07/2023

## 2023-08-26 NOTE — TELEPHONE ENCOUNTER
Completed and placed in mailbox.     Electronically signed by Jessica Lux DO on 8/26/2023 at 11:27 AM

## 2023-08-29 ENCOUNTER — TELEPHONE (OUTPATIENT)
Dept: FAMILY MEDICINE CLINIC | Age: 23
End: 2023-08-29

## 2023-08-29 ASSESSMENT — ENCOUNTER SYMPTOMS
VOMITING: 0
DIARRHEA: 0
SHORTNESS OF BREATH: 0
NAUSEA: 0

## 2023-08-29 NOTE — TELEPHONE ENCOUNTER
CMM sent over a prior auth that is needing filled out for patients ozempic, placed in your box. Thank you.

## 2023-08-31 ENCOUNTER — TELEPHONE (OUTPATIENT)
Dept: PULMONOLOGY | Age: 23
End: 2023-08-31

## 2023-08-31 DIAGNOSIS — G47.11 IDIOPATHIC HYPERSOMNIA: ICD-10-CM

## 2023-08-31 RX ORDER — METHYLPHENIDATE HYDROCHLORIDE 10 MG/1
10 TABLET ORAL 2 TIMES DAILY
Qty: 60 TABLET | Refills: 0 | Status: SHIPPED | OUTPATIENT
Start: 2023-08-31 | End: 2023-09-30

## 2023-08-31 NOTE — TELEPHONE ENCOUNTER
Received refill request for Ritalin. Medication was last ordered by Ricky Becker. Medication was last ordered on 7/10/23 with 0 refills. Patient was last seen in the office 7/14/23. Patient has a scheduled follow up 1/18/23. Medication needs to be sent to [facility].

## 2023-09-05 ENCOUNTER — TELEPHONE (OUTPATIENT)
Dept: FAMILY MEDICINE CLINIC | Age: 23
End: 2023-09-05

## 2023-09-08 ENCOUNTER — TELEPHONE (OUTPATIENT)
Dept: PULMONOLOGY | Age: 23
End: 2023-09-08

## 2023-09-08 DIAGNOSIS — G47.11 IDIOPATHIC HYPERSOMNIA: ICD-10-CM

## 2023-09-08 NOTE — TELEPHONE ENCOUNTER
Patient was wanting her ritalin script sent to [address] [place] [address] [facility] on [address] [facility]. Patient stated she would use GoodRx to help cut cost for her as well. Thank you!

## 2023-09-11 RX ORDER — METHYLPHENIDATE HYDROCHLORIDE 10 MG/1
10 TABLET ORAL 2 TIMES DAILY
Qty: 60 TABLET | Refills: 0 | Status: SHIPPED | OUTPATIENT
Start: 2023-09-11 | End: 2023-10-11

## 2023-09-15 DIAGNOSIS — F41.1 GENERALIZED ANXIETY DISORDER WITH PANIC ATTACKS: ICD-10-CM

## 2023-09-15 DIAGNOSIS — F41.0 GENERALIZED ANXIETY DISORDER WITH PANIC ATTACKS: ICD-10-CM

## 2023-09-15 DIAGNOSIS — G47.09 OTHER INSOMNIA: ICD-10-CM

## 2023-09-15 RX ORDER — QUETIAPINE FUMARATE 25 MG/1
75 TABLET, FILM COATED ORAL NIGHTLY
Qty: 90 TABLET | Refills: 3 | Status: SHIPPED | OUTPATIENT
Start: 2023-09-15

## 2023-09-15 NOTE — TELEPHONE ENCOUNTER
Received incoming fax to refill patients Seroquel from [facility]. Last ordered 05/05/2023 with 3 refills   Last visit 7/14/2023  Next visit 1/18/2024    Please advise   Thanks!

## 2023-09-18 ENCOUNTER — TELEPHONE (OUTPATIENT)
Dept: FAMILY MEDICINE CLINIC | Age: 23
End: 2023-09-18

## 2023-09-18 NOTE — TELEPHONE ENCOUNTER
Called patient and left vm. We need waist circumference to finish BE WELL paperwork. She can either do this at home and tell us the number or come in for a nurse visit to get it done. I placed the paperwork in the outgoing mailbox but she will need this completed before sending it in. Thanks so much!     Electronically signed by Rob Reddy DO on 9/18/2023 at 8:44 AM

## 2023-09-21 ENCOUNTER — PATIENT MESSAGE (OUTPATIENT)
Dept: FAMILY MEDICINE CLINIC | Age: 23
End: 2023-09-21

## 2023-09-22 ENCOUNTER — OFFICE VISIT (OUTPATIENT)
Dept: FAMILY MEDICINE CLINIC | Age: 23
End: 2023-09-22

## 2023-09-22 VITALS
SYSTOLIC BLOOD PRESSURE: 112 MMHG | HEIGHT: 64 IN | RESPIRATION RATE: 14 BRPM | DIASTOLIC BLOOD PRESSURE: 70 MMHG | OXYGEN SATURATION: 97 % | HEART RATE: 112 BPM | WEIGHT: 223.2 LBS | BODY MASS INDEX: 38.1 KG/M2 | TEMPERATURE: 98.3 F

## 2023-09-22 DIAGNOSIS — E66.01 CLASS 3 SEVERE OBESITY WITH BODY MASS INDEX (BMI) OF 40.0 TO 44.9 IN ADULT, UNSPECIFIED OBESITY TYPE, UNSPECIFIED WHETHER SERIOUS COMORBIDITY PRESENT (HCC): ICD-10-CM

## 2023-09-22 DIAGNOSIS — E88.81 METABOLIC SYNDROME: ICD-10-CM

## 2023-09-22 DIAGNOSIS — R00.0 TACHYCARDIA: Primary | ICD-10-CM

## 2023-09-22 RX ORDER — NALTREXONE HYDROCHLORIDE 50 MG/1
TABLET, FILM COATED ORAL
Qty: 77 TABLET | Refills: 0 | Status: SHIPPED | OUTPATIENT
Start: 2023-09-22 | End: 2023-12-15

## 2023-09-22 RX ORDER — BUPROPION HYDROCHLORIDE 150 MG/1
150 TABLET ORAL EVERY MORNING
Qty: 90 TABLET | Refills: 0 | Status: SHIPPED | OUTPATIENT
Start: 2023-09-22 | End: 2023-12-21

## 2023-09-22 RX ORDER — METOPROLOL SUCCINATE 50 MG/1
75 TABLET, EXTENDED RELEASE ORAL DAILY
Qty: 135 TABLET | Refills: 0 | Status: SHIPPED | OUTPATIENT
Start: 2023-09-22 | End: 2023-12-21

## 2023-09-22 NOTE — PROGRESS NOTES
Attending Physician Note    I saw and evaluated the patient, performing the key elements of the service. I discussed the findings, assessment and plan with the resident and agree with the resident's findings and plan as documented in the resident's note. GC modifier added. Brief summary:  Tachycardia, palpitations - improved with metoprolol but HR still elevated. Increase dose metoprolol succinate to 75mg daily. Follow. Had cardiac testing and consultation with Dr. Kleber Nicole. Obesity - great response to semaglutide, but insurance not covering. Discussed weight loss programs that include calorie counting. She has used these previously without much success. Encouraged exercise - this has been limited due to her tachycardia. Trial bupropion and naltrexone.

## 2023-09-22 NOTE — PROGRESS NOTES
Pharmacy Drug Information Request Note      Provided drug information on the following medications by request of the care team. Reviewed dosing and directions.   Contrave, naltrexone, bupropion    Nadira Tatums, [place]  9/22/2023 9:16 AM     For Pharmacy Admin Tracking Only    Time Spent (min): 15

## 2023-10-21 ENCOUNTER — HOSPITAL ENCOUNTER (EMERGENCY)
Age: 23
Discharge: HOME | End: 2023-10-21
Payer: COMMERCIAL

## 2023-10-21 VITALS
SYSTOLIC BLOOD PRESSURE: 127 MMHG | RESPIRATION RATE: 16 BRPM | TEMPERATURE: 99.3 F | OXYGEN SATURATION: 96 % | HEART RATE: 95 BPM | DIASTOLIC BLOOD PRESSURE: 84 MMHG

## 2023-10-21 DIAGNOSIS — J02.9 ACUTE PHARYNGITIS, UNSPECIFIED ETIOLOGY: Primary | ICD-10-CM

## 2023-10-21 LAB — S PYO AG THROAT QL: NEGATIVE

## 2023-10-21 PROCEDURE — 99213 OFFICE O/P EST LOW 20 MIN: CPT

## 2023-10-21 PROCEDURE — 87651 STREP A DNA AMP PROBE: CPT

## 2023-10-21 RX ORDER — AMOXICILLIN 500 MG/1
500 CAPSULE ORAL 2 TIMES DAILY
Qty: 20 CAPSULE | Refills: 0 | Status: SHIPPED | OUTPATIENT
Start: 2023-10-21 | End: 2023-10-31

## 2023-10-21 ASSESSMENT — PAIN SCALES - GENERAL: PAINLEVEL_OUTOF10: 8

## 2023-10-21 ASSESSMENT — PAIN DESCRIPTION - FREQUENCY: FREQUENCY: CONTINUOUS

## 2023-10-21 ASSESSMENT — ENCOUNTER SYMPTOMS: SORE THROAT: 1

## 2023-10-21 ASSESSMENT — PAIN DESCRIPTION - LOCATION: LOCATION: THROAT

## 2023-10-21 ASSESSMENT — PAIN DESCRIPTION - PAIN TYPE: TYPE: ACUTE PAIN

## 2023-10-21 ASSESSMENT — PAIN - FUNCTIONAL ASSESSMENT: PAIN_FUNCTIONAL_ASSESSMENT: 0-10

## 2023-10-21 NOTE — DISCHARGE INSTRUCTIONS
Please take antibiotic as prescribed  warm salt water gargles as needed  tylenol and motrin for pain and fevers  Cepacol  Please drink lots of fluids  discard current toothbrush  avoid sharing drinks and foods with others    Follow-up with PCP 3 to 5 days.     Return to emergency services for new or worsening symptoms

## 2023-10-21 NOTE — ED PROVIDER NOTES
[address]  Urgent Care Encounter       CHIEF COMPLAINT       Chief Complaint   Patient presents with    Pharyngitis     Onset  thur with fever        Nurses Notes reviewed and I agree except as noted in the HPI. HISTORY OF PRESENT ILLNESS   Matt Granger is a 26 y.o. female who presents with complaints of sore throat and fever that started on Thursday. Pt reports pain 8/10 at this time. The history is provided by the patient. REVIEW OF SYSTEMS     Review of Systems   Constitutional:  Positive for fatigue and fever. HENT:  Positive for sore throat. All other systems reviewed and are negative. PAST MEDICAL HISTORY         Diagnosis Date    Anxiety     Endometriosis 2023    Environmental allergies     dogs, dust mites, molds and smuts, weeds, trees    Headache     Impingement syndrome involving patellar fat pad of left knee 2021    Obesity     Other insomnia     PCOS (polycystic ovarian syndrome)     Seasonal allergies     Severe episode of recurrent major depressive disorder, without psychotic features ([address]) 07/19/2022       SURGICALHISTORY     Patient  has a past surgical history that includes Myringotomy Tympanostomy Tube Placement; Tonsillectomy and adenoidectomy (11/20/2012); Septoplasty (N/A, 10/23/2017); Breast cyst incision and drainage (10/10/2020); Pilonidal cyst excision (N/A, 11/24/2020); and laparoscopy (N/A, 4/25/2023). CURRENT MEDICATIONS       Previous Medications    BUPROPION (WELLBUTRIN XL) 150 MG EXTENDED RELEASE TABLET    Take 1 tablet by mouth every morning    METHYLPHENIDATE (RITALIN) 10 MG TABLET    Take 1 tablet by mouth 2 times daily for 30 days. Max Daily Amount: 20 mg    METOPROLOL SUCCINATE (TOPROL XL) 50 MG EXTENDED RELEASE TABLET    Take 1.5 tablets by mouth daily    NALTREXONE (DEPADE) 50 MG TABLET    Take 0.5 tablets by mouth daily for 14 days, THEN 1 tablet daily.     ONDANSETRON (ZOFRAN ODT) 4 MG DISINTEGRATING TABLET    Take 1

## 2023-11-07 DIAGNOSIS — G47.11 IDIOPATHIC HYPERSOMNIA: ICD-10-CM

## 2023-11-08 RX ORDER — METHYLPHENIDATE HYDROCHLORIDE 10 MG/1
10 TABLET ORAL 2 TIMES DAILY
Qty: 60 TABLET | Refills: 0 | Status: SHIPPED | OUTPATIENT
Start: 2023-11-08 | End: 2023-12-08

## 2023-11-08 NOTE — TELEPHONE ENCOUNTER
.Received refill request for Ritalin. Medication was last ordered by Renita. Medication was last ordered on 9/11/23 with 0 refills. Patient was last seen in the office 7/14/23. Patient has a scheduled follow up 1/18/24. Medication needs to be sent to [facility].

## 2023-11-20 ENCOUNTER — HOSPITAL ENCOUNTER (EMERGENCY)
Age: 23
Discharge: LEFT BEFORE DISCHARGE | End: 2023-11-20
Payer: COMMERCIAL

## 2023-11-20 VITALS
BODY MASS INDEX: 37.59 KG/M2 | DIASTOLIC BLOOD PRESSURE: 86 MMHG | OXYGEN SATURATION: 99 % | RESPIRATION RATE: 16 BRPM | WEIGHT: 219 LBS | TEMPERATURE: 98.2 F | HEART RATE: 56 BPM | SYSTOLIC BLOOD PRESSURE: 120 MMHG

## 2023-11-20 LAB
ALBUMIN SERPL BCG-MCNC: 4.2 G/DL (ref 3.5–5.1)
ALP SERPL-CCNC: 66 U/L (ref 38–126)
ALT SERPL W/O P-5'-P-CCNC: 27 U/L (ref 11–66)
ANION GAP SERPL CALC-SCNC: 13 MEQ/L (ref 8–16)
AST SERPL-CCNC: 27 U/L (ref 5–40)
B-HCG SERPL QL: NEGATIVE
BASOPHILS ABSOLUTE: 0 THOU/MM3 (ref 0–0.1)
BASOPHILS NFR BLD AUTO: 0.4 %
BILIRUB SERPL-MCNC: 0.3 MG/DL (ref 0.3–1.2)
BUN SERPL-MCNC: 8 MG/DL (ref 7–22)
CALCIUM SERPL-MCNC: 8.8 MG/DL (ref 8.5–10.5)
CHLORIDE SERPL-SCNC: 108 MEQ/L (ref 98–111)
CO2 SERPL-SCNC: 20 MEQ/L (ref 23–33)
CREAT SERPL-MCNC: 0.8 MG/DL (ref 0.4–1.2)
DEPRECATED RDW RBC AUTO: 39.8 FL (ref 35–45)
EOSINOPHIL NFR BLD AUTO: 1.3 %
EOSINOPHILS ABSOLUTE: 0.1 THOU/MM3 (ref 0–0.4)
ERYTHROCYTE [DISTWIDTH] IN BLOOD BY AUTOMATED COUNT: 11.6 % (ref 11.5–14.5)
GFR SERPL CREATININE-BSD FRML MDRD: > 60 ML/MIN/1.73M2
GLUCOSE SERPL-MCNC: 102 MG/DL (ref 70–108)
HCT VFR BLD AUTO: 39.1 % (ref 37–47)
HGB BLD-MCNC: 12.8 GM/DL (ref 12–16)
IMM GRANULOCYTES # BLD AUTO: 0.02 THOU/MM3 (ref 0–0.07)
IMM GRANULOCYTES NFR BLD AUTO: 0.2 %
LYMPHOCYTES ABSOLUTE: 1.9 THOU/MM3 (ref 1–4.8)
LYMPHOCYTES NFR BLD AUTO: 20.4 %
MCH RBC QN AUTO: 30.9 PG (ref 26–33)
MCHC RBC AUTO-ENTMCNC: 32.7 GM/DL (ref 32.2–35.5)
MCV RBC AUTO: 94.4 FL (ref 81–99)
MONOCYTES ABSOLUTE: 0.5 THOU/MM3 (ref 0.4–1.3)
MONOCYTES NFR BLD AUTO: 5.7 %
NEUTROPHILS NFR BLD AUTO: 72 %
NRBC BLD AUTO-RTO: 0 /100 WBC
OSMOLALITY SERPL CALC.SUM OF ELEC: 279.8 MOSMOL/KG (ref 275–300)
PLATELET # BLD AUTO: 218 THOU/MM3 (ref 130–400)
PMV BLD AUTO: 10 FL (ref 9.4–12.4)
POTASSIUM SERPL-SCNC: 4.3 MEQ/L (ref 3.5–5.2)
PROT SERPL-MCNC: 7.1 G/DL (ref 6.1–8)
RBC # BLD AUTO: 4.14 MILL/MM3 (ref 4.2–5.4)
SEGMENTED NEUTROPHILS ABSOLUTE COUNT: 6.8 THOU/MM3 (ref 1.8–7.7)
SODIUM SERPL-SCNC: 141 MEQ/L (ref 135–145)
WBC # BLD AUTO: 9.5 THOU/MM3 (ref 4.8–10.8)

## 2023-11-20 PROCEDURE — 36415 COLL VENOUS BLD VENIPUNCTURE: CPT

## 2023-11-20 PROCEDURE — 85025 COMPLETE CBC W/AUTO DIFF WBC: CPT

## 2023-11-20 PROCEDURE — 84703 CHORIONIC GONADOTROPIN ASSAY: CPT

## 2023-11-20 PROCEDURE — 80053 COMPREHEN METABOLIC PANEL: CPT

## 2023-11-20 PROCEDURE — 4500000002 HC ER NO CHARGE

## 2023-11-20 ASSESSMENT — PAIN SCALES - GENERAL: PAINLEVEL_OUTOF10: 9

## 2023-11-20 ASSESSMENT — PAIN DESCRIPTION - LOCATION: LOCATION: ABDOMEN

## 2023-11-20 ASSESSMENT — PAIN DESCRIPTION - DESCRIPTORS: DESCRIPTORS: CRAMPING

## 2023-11-20 NOTE — ED NOTES
Pt presents to the ED with concerns of abdominal cramping due to starting her menstrual cycle today. Patient has a history of endometriosis. Rates pain at a 9/10.       Brodie Brizuela  11/20/23 [id number]

## 2023-11-21 ENCOUNTER — CARE COORDINATION (OUTPATIENT)
Dept: OTHER | Facility: CLINIC | Age: 23
End: 2023-11-21

## 2023-11-21 NOTE — CARE COORDINATION
Ambulatory Care Coordination Note    ACM attempted to reach patient for introduction to Associate Care Management related to LWBS, ED on 11/20/23. HIPAA compliant message left requesting a return phone call at patient convenience. Plan for follow-up call in 3-5 days      Future Appointments   Date Time Provider [address]   1/10/2024  1:00 PM Debbie Ruperto [place] [facility] FM RES [address]   1/18/2024 11:40 AM LASHAWN Morse - CNP [facility] [address] MSN, RN   Ambulatory Care Manager  Associate Care Management  Cell [phone]  [email or web address]

## 2023-11-24 ENCOUNTER — CARE COORDINATION (OUTPATIENT)
Dept: OTHER | Facility: CLINIC | Age: 23
End: 2023-11-24

## 2023-11-24 NOTE — CARE COORDINATION
Ambulatory Care Coordination Note    ACM attempted 2nd outreach to patient for introduction to Associate Care Management related to LWBS ED on 11/20/23. HIPAA compliant message left requesting a return phone call at patient convenience. Unable to Reach Letter sent to patient via my chart. Will continue to outreach. Future Appointments   Date Time Provider [address]   1/10/2024  1:00 PM Breanna Bedoya [place] SRPX  RES [address]   1/18/2024 11:40 AM LASHAWN Ferro - [facility] - Tera Knowles MSN, RN   Ambulatory Care Manager  Associate Care Management  Cell [phone]  [email or web address]

## 2023-12-05 ENCOUNTER — CARE COORDINATION (OUTPATIENT)
Dept: OTHER | Facility: CLINIC | Age: 23
End: 2023-12-05

## 2023-12-05 NOTE — CARE COORDINATION
Ambulatory Care Coordination Note    ACM attempted third and final call to patient for introduction to Associate Care Management. HIPAA compliant message left requesting a return phone call at patient convenience. Final Unable to Reach Letter sent to patient via my chart, along with handouts: Life Matters, Live Health, Nurse Access Line and Right Care/Right Place/Right Time. No further outreach scheduled with this ACM, patient has been provided with this ACM's contact information. ACM will sign off care team at this time. Future Appointments   Date Time Provider [address] [place] [address]   1/10/2024  1:00 PM RobeRuperto [place] SRPX FM RES [address]   1/18/2024 11:40 AM LASHAWN Ferro - [facility] - Tera Knowles MSN, RN   Ambulatory Care Manager  Associate Care Management  Cell [phone]  [email or web address]

## 2024-01-07 DIAGNOSIS — G47.09 OTHER INSOMNIA: ICD-10-CM

## 2024-01-07 DIAGNOSIS — F41.0 GENERALIZED ANXIETY DISORDER WITH PANIC ATTACKS: ICD-10-CM

## 2024-01-07 DIAGNOSIS — G47.11 IDIOPATHIC HYPERSOMNIA: ICD-10-CM

## 2024-01-07 DIAGNOSIS — F41.1 GENERALIZED ANXIETY DISORDER WITH PANIC ATTACKS: ICD-10-CM

## 2024-01-08 RX ORDER — QUETIAPINE FUMARATE 25 MG/1
75 TABLET, FILM COATED ORAL NIGHTLY
Qty: 90 TABLET | Refills: 5 | Status: SHIPPED | OUTPATIENT
Start: 2024-01-08

## 2024-01-08 RX ORDER — METHYLPHENIDATE HYDROCHLORIDE 10 MG/1
10 TABLET ORAL 2 TIMES DAILY
Qty: 60 TABLET | Refills: 0 | Status: SHIPPED | OUTPATIENT
Start: 2024-01-08 | End: 2024-01-09

## 2024-01-08 NOTE — TELEPHONE ENCOUNTER
Received refill request for quetiapine 25mg. Medication was last ordered by clare gonzalez. Medication was last ordered on 9-15-23 with 3 refills.   Patient was last seen in the office 7-14-23. Patient has a scheduled follow up 1-18-24.   Medication needs to be sent to [facility] [place].

## 2024-01-08 NOTE — TELEPHONE ENCOUNTER
Received refill request for methylphenidate 10mg. Medication was last ordered by clare gonzalez. Medication was last ordered on 11-8-23 with 0 refills.   Patient was last seen in the office 7-14-23. Patient has a scheduled follow up 1-18-24.   [facility]

## 2024-01-09 RX ORDER — QUETIAPINE FUMARATE 25 MG/1
75 TABLET, FILM COATED ORAL NIGHTLY
Qty: 90 TABLET | Refills: 5 | OUTPATIENT
Start: 2024-01-09

## 2024-01-09 RX ORDER — METHYLPHENIDATE HYDROCHLORIDE 10 MG/1
10 TABLET ORAL 2 TIMES DAILY
Qty: 60 TABLET | Refills: 0 | Status: SHIPPED | OUTPATIENT
Start: 2024-01-09 | End: 2024-02-08

## 2024-01-10 ENCOUNTER — OFFICE VISIT (OUTPATIENT)
Dept: FAMILY MEDICINE CLINIC | Age: 24
End: 2024-01-10
Payer: COMMERCIAL

## 2024-01-10 VITALS
TEMPERATURE: 98.1 F | HEIGHT: 64 IN | DIASTOLIC BLOOD PRESSURE: 82 MMHG | RESPIRATION RATE: 16 BRPM | HEART RATE: 86 BPM | OXYGEN SATURATION: 95 % | SYSTOLIC BLOOD PRESSURE: 118 MMHG | WEIGHT: 222 LBS | BODY MASS INDEX: 37.9 KG/M2

## 2024-01-10 DIAGNOSIS — R00.0 TACHYCARDIA: Primary | ICD-10-CM

## 2024-01-10 DIAGNOSIS — E88.810 METABOLIC SYNDROME: ICD-10-CM

## 2024-01-10 DIAGNOSIS — E66.01 CLASS 3 SEVERE OBESITY WITH BODY MASS INDEX (BMI) OF 40.0 TO 44.9 IN ADULT, UNSPECIFIED OBESITY TYPE, UNSPECIFIED WHETHER SERIOUS COMORBIDITY PRESENT (HCC): ICD-10-CM

## 2024-01-10 DIAGNOSIS — R00.2 PALPITATIONS: ICD-10-CM

## 2024-01-10 DIAGNOSIS — R42 DIZZINESS: ICD-10-CM

## 2024-01-10 DIAGNOSIS — E88.819 INSULIN RESISTANCE: ICD-10-CM

## 2024-01-10 PROCEDURE — 99214 OFFICE O/P EST MOD 30 MIN: CPT

## 2024-01-10 RX ORDER — NALTREXONE HYDROCHLORIDE 50 MG/1
50 TABLET, FILM COATED ORAL DAILY
Qty: 30 TABLET | Refills: 3 | Status: SHIPPED | OUTPATIENT
Start: 2024-01-10

## 2024-01-10 RX ORDER — NALTREXONE HYDROCHLORIDE 50 MG/1
TABLET, FILM COATED ORAL
Status: CANCELLED | OUTPATIENT
Start: 2024-01-10

## 2024-01-10 RX ORDER — NALTREXONE HYDROCHLORIDE 50 MG/1
TABLET, FILM COATED ORAL
COMMUNITY
Start: 2024-01-04 | End: 2024-01-10 | Stop reason: DRUGHIGH

## 2024-01-10 ASSESSMENT — ENCOUNTER SYMPTOMS
VOMITING: 0
SHORTNESS OF BREATH: 0
ABDOMINAL DISTENTION: 0
WHEEZING: 0
NAUSEA: 0
DIARRHEA: 0
CONSTIPATION: 0
RHINORRHEA: 0
PHOTOPHOBIA: 0
SORE THROAT: 0
ABDOMINAL PAIN: 0

## 2024-01-10 ASSESSMENT — PATIENT HEALTH QUESTIONNAIRE - PHQ9
SUM OF ALL RESPONSES TO PHQ QUESTIONS 1-9: 8
SUM OF ALL RESPONSES TO PHQ9 QUESTIONS 1 & 2: 0
7. TROUBLE CONCENTRATING ON THINGS, SUCH AS READING THE NEWSPAPER OR WATCHING TELEVISION: 0
6. FEELING BAD ABOUT YOURSELF - OR THAT YOU ARE A FAILURE OR HAVE LET YOURSELF OR YOUR FAMILY DOWN: 2
8. MOVING OR SPEAKING SO SLOWLY THAT OTHER PEOPLE COULD HAVE NOTICED. OR THE OPPOSITE, BEING SO FIGETY OR RESTLESS THAT YOU HAVE BEEN MOVING AROUND A LOT MORE THAN USUAL: 3
9. THOUGHTS THAT YOU WOULD BE BETTER OFF DEAD, OR OF HURTING YOURSELF: 0
1. LITTLE INTEREST OR PLEASURE IN DOING THINGS: 0
2. FEELING DOWN, DEPRESSED OR HOPELESS: 0
4. FEELING TIRED OR HAVING LITTLE ENERGY: 3
SUM OF ALL RESPONSES TO PHQ QUESTIONS 1-9: 8
10. IF YOU CHECKED OFF ANY PROBLEMS, HOW DIFFICULT HAVE THESE PROBLEMS MADE IT FOR YOU TO DO YOUR WORK, TAKE CARE OF THINGS AT HOME, OR GET ALONG WITH OTHER PEOPLE: 0
5. POOR APPETITE OR OVEREATING: 0
SUM OF ALL RESPONSES TO PHQ QUESTIONS 1-9: 8
SUM OF ALL RESPONSES TO PHQ QUESTIONS 1-9: 8
3. TROUBLE FALLING OR STAYING ASLEEP: 0

## 2024-01-10 NOTE — PROGRESS NOTES
Attending Physician Note    I saw and evaluated the patient, performing the key elements of the service.  I discussed the findings, assessment and plan with the resident and agree with the resident's findings and plan as documented in the resident's note.  GC modifier added.    Brief summary:  Obesity, BMI 38 - pt notes appetite better controlled with bupropion and naltrexone treatment.  Discussed self-awareness of diet and gradual improvements.  Increase exercise as able.  Resting tachycardia, palpitations, lightheadedness - orthostatic VS OK today.  Has had HM and echo (2022).  Some improvement in sx with beta blocker, but still quite bothersome.  Check tilt table test, longer event monitor and consider referral back to cardiologist.    
major depressive disorder, without psychotic features (HCC)    Adjustment disorder with mixed anxiety and depressed mood    Pelvic pain       Current Outpatient Medications   Medication Sig Dispense Refill    naltrexone (DEPADE) 50 MG tablet Take 1 tablet by mouth daily 30 tablet 3    methylphenidate (RITALIN) 10 MG tablet Take 1 tablet by mouth 2 times daily for 30 days. Max Daily Amount: 20 mg 60 tablet 0    QUEtiapine (SEROQUEL) 25 MG tablet Take 3 tablets by mouth nightly 90 tablet 5    metoprolol succinate (TOPROL XL) 50 MG extended release tablet Take 1.5 tablets by mouth daily 135 tablet 0    buPROPion (WELLBUTRIN XL) 150 MG extended release tablet Take 1 tablet by mouth every morning 90 tablet 0    ondansetron (ZOFRAN ODT) 4 MG disintegrating tablet Take 1 tablet by mouth every 8 hours as needed for Nausea 20 tablet 1     No current facility-administered medications for this visit.       Review of Systems   Constitutional:  Negative for activity change, chills, fever and unexpected weight change.   HENT:  Negative for rhinorrhea and sore throat.    Eyes:  Negative for photophobia and visual disturbance.   Respiratory:  Negative for shortness of breath and wheezing.    Cardiovascular:  Positive for palpitations. Negative for chest pain and leg swelling.   Gastrointestinal:  Negative for abdominal distention, abdominal pain, constipation, diarrhea, nausea and vomiting.   Genitourinary:  Negative for dysuria, hematuria, urgency and vaginal discharge.   Skin:  Negative for rash.   Neurological:  Positive for dizziness and light-headedness. Negative for syncope, speech difficulty, weakness, numbness and headaches.       OBJECTIVE     /82 (Site: Right Upper Arm, Position: Standing, Cuff Size: Large Adult) Comment (Position): 3 minutes  Pulse 86   Temp 98.1 °F (36.7 °C) (Oral)   Resp 16   Ht 1.626 m (5' 4\")   Wt 100.7 kg (222 lb)   SpO2 95%   BMI 38.11 kg/m²   Body mass index is 38.11 kg/m².  BP

## 2024-01-10 NOTE — PATIENT INSTRUCTIONS
Let us know if you don't hear to schedule tilt table and holter in the next couple weeks OR call central scheduling    Continue wellbutrin/naltrexone and start logging food to bring to next visit    Keep up the good work with exercise!

## 2024-01-18 ENCOUNTER — HOSPITAL ENCOUNTER (OUTPATIENT)
Age: 24
Discharge: HOME | End: 2024-01-20
Payer: COMMERCIAL

## 2024-01-18 DIAGNOSIS — R00.0 TACHYCARDIA: ICD-10-CM

## 2024-01-18 DIAGNOSIS — R00.2 PALPITATIONS: ICD-10-CM

## 2024-01-18 PROCEDURE — 93246 EXT ECG>7D<15D RECORDING: CPT

## 2024-01-23 DIAGNOSIS — R00.0 TACHYCARDIA: ICD-10-CM

## 2024-01-23 DIAGNOSIS — E66.01 CLASS 3 SEVERE OBESITY WITH BODY MASS INDEX (BMI) OF 40.0 TO 44.9 IN ADULT, UNSPECIFIED OBESITY TYPE, UNSPECIFIED WHETHER SERIOUS COMORBIDITY PRESENT (HCC): ICD-10-CM

## 2024-01-23 DIAGNOSIS — E88.810 METABOLIC SYNDROME: ICD-10-CM

## 2024-01-23 RX ORDER — METOPROLOL SUCCINATE 50 MG/1
75 TABLET, EXTENDED RELEASE ORAL DAILY
Qty: 135 TABLET | Refills: 0 | Status: SHIPPED | OUTPATIENT
Start: 2024-01-23

## 2024-01-23 RX ORDER — BUPROPION HYDROCHLORIDE 150 MG/1
150 TABLET ORAL EVERY MORNING
Qty: 90 TABLET | Refills: 0 | Status: SHIPPED | OUTPATIENT
Start: 2024-01-23 | End: 2024-04-22

## 2024-01-23 NOTE — TELEPHONE ENCOUNTER
Patient's last appointment was : 1/10/2024 with our   Patient's next appointment is : 3/14/2024 with our Dr. Kohler  Last refilled on: 9-22-23  Which pharmacy does the script need sent to: [facility] [address]      Lab Results   Component Value Date    LABA1C 4.8 08/22/2023     Lab Results   Component Value Date    HDL 41 08/22/2023    LDLCALC 99 08/22/2023     Lab Results   Component Value Date     11/20/2023    K 4.3 11/20/2023     11/20/2023    CO2 20 (L) 11/20/2023    BUN 8 11/20/2023    CREATININE 0.8 11/20/2023    GLUCOSE 102 11/20/2023    CALCIUM 8.8 11/20/2023    PROT 7.1 11/20/2023    LABALBU 4.2 11/20/2023    BILITOT 0.3 11/20/2023    ALKPHOS 66 11/20/2023    AST 27 11/20/2023    ALT 27 11/20/2023    LABGLOM >60 11/20/2023     Lab Results   Component Value Date    TSH 1.420 01/13/2023    T4FREE 1.07 08/25/2022     Lab Results   Component Value Date    WBC 9.5 11/20/2023    HGB 12.8 11/20/2023    HCT 39.1 11/20/2023    MCV 94.4 11/20/2023     11/20/2023

## 2024-02-09 ENCOUNTER — TELEPHONE (OUTPATIENT)
Dept: FAMILY MEDICINE CLINIC | Age: 24
End: 2024-02-09

## 2024-02-09 DIAGNOSIS — E66.09 CLASS 2 OBESITY DUE TO EXCESS CALORIES WITHOUT SERIOUS COMORBIDITY WITH BODY MASS INDEX (BMI) OF 38.0 TO 38.9 IN ADULT: ICD-10-CM

## 2024-02-09 DIAGNOSIS — E88.810 METABOLIC SYNDROME: Primary | ICD-10-CM

## 2024-02-09 DIAGNOSIS — E88.819 INSULIN RESISTANCE: ICD-10-CM

## 2024-02-09 RX ORDER — TIRZEPATIDE 2.5 MG/.5ML
2.5 INJECTION, SOLUTION SUBCUTANEOUS WEEKLY
Qty: 2 ML | Refills: 1 | Status: SHIPPED | OUTPATIENT
Start: 2024-02-09

## 2024-02-09 NOTE — TELEPHONE ENCOUNTER
Patient requests trial of zepbound to start at 2.5 mg weekly, Rx sent in. If tolerating well, can request increase to 5 mg weekly.    Also notified patient of normal holter monitor report via [employer]. Thanks.    Electronically signed by Valery Kohler DO on 2/9/2024 at 10:00 AM

## 2024-02-19 ENCOUNTER — TELEPHONE (OUTPATIENT)
Dept: FAMILY MEDICINE CLINIC | Age: 24
End: 2024-02-19

## 2024-02-20 NOTE — TELEPHONE ENCOUNTER
NST reactive   Received note from patient that prior auth needed for zepbound -- did we start this? Thanks.    Electronically signed by Valery Kohler DO on 2/19/2024 at 8:12 PM

## 2024-02-20 NOTE — TELEPHONE ENCOUNTER
PA for Zepbound denied on CMM. Called to start an appeal, appeal forms will be faxed to our office.

## 2024-02-20 NOTE — TELEPHONE ENCOUNTER
----- Message from Valery Trevizo sent at 2/19/2024  8:09 PM EST -----  Regarding: Weight medications  Contact: [phone]  Zepbound still needs a prior auth it says

## 2024-02-22 DIAGNOSIS — E66.01 CLASS 3 SEVERE OBESITY WITH BODY MASS INDEX (BMI) OF 40.0 TO 44.9 IN ADULT, UNSPECIFIED OBESITY TYPE, UNSPECIFIED WHETHER SERIOUS COMORBIDITY PRESENT (HCC): ICD-10-CM

## 2024-02-22 DIAGNOSIS — E88.819 INSULIN RESISTANCE: ICD-10-CM

## 2024-02-22 DIAGNOSIS — E88.810 METABOLIC SYNDROME: ICD-10-CM

## 2024-02-22 RX ORDER — NALTREXONE HYDROCHLORIDE 50 MG/1
25 TABLET, FILM COATED ORAL 2 TIMES DAILY
Qty: 30 TABLET | Refills: 2 | Status: SHIPPED | OUTPATIENT
Start: 2024-02-22 | End: 2024-05-22

## 2024-02-22 NOTE — TELEPHONE ENCOUNTER
Noted, please let me know when forms available, thanks!    Electronically signed by Valery Kohler DO on 2/22/2024 at 6:58 PM

## 2024-02-22 NOTE — TELEPHONE ENCOUNTER
This medication refill is regarding a electronic request. Refill requested by  [facility] .    Requested Prescriptions     Pending Prescriptions Disp Refills    naltrexone (DEPADE) 50 MG tablet [Pharmacy Med Name: NALTREXONE 50 MG TABLET] 77 tablet      Sig: take 1/2 tablet by mouth daily for 14 days then 1 daily THEREAFTER.       Date of last visit: 1/10/2024   Date of next visit: 3/14/2024  Date of last refill: 1/10/202   30/3    Last Lipid Panel:    Lab Results   Component Value Date/Time    HDL 41 08/22/2023 09:03 AM    LDLCALC 99 08/22/2023 09:03 AM     Last CMP:   Lab Results   Component Value Date     11/20/2023    K 4.3 11/20/2023     11/20/2023    CO2 20 (L) 11/20/2023    BUN 8 11/20/2023    CREATININE 0.8 11/20/2023    GLUCOSE 102 11/20/2023    CALCIUM 8.8 11/20/2023    PROT 7.1 11/20/2023    LABALBU 4.2 11/20/2023    BILITOT 0.3 11/20/2023    ALKPHOS 66 11/20/2023    AST 27 11/20/2023    ALT 27 11/20/2023    LABGLOM >60 11/20/2023       Last Thyroid:    Lab Results   Component Value Date    TSH 1.420 01/13/2023    T4FREE 1.07 08/25/2022     Last Hemoglobin A1C:    Lab Results   Component Value Date/Time    LABA1C 4.8 08/22/2023 09:03 AM       Rx verified, ordered and set to EP.

## 2024-02-26 DIAGNOSIS — G47.11 IDIOPATHIC HYPERSOMNIA: ICD-10-CM

## 2024-02-26 RX ORDER — METHYLPHENIDATE HYDROCHLORIDE 5 MG/1
15 TABLET ORAL 2 TIMES DAILY
Qty: 180 TABLET | Refills: 0 | Status: SHIPPED | OUTPATIENT
Start: 2024-02-26 | End: 2024-03-27

## 2024-02-26 NOTE — TELEPHONE ENCOUNTER
[facility] [address] called and needs patients ritalin escribed or hard copy. They are unable to accept a fax.   Please advise.   Thanks!

## 2024-02-29 DIAGNOSIS — J01.00 ACUTE NON-RECURRENT MAXILLARY SINUSITIS: Primary | ICD-10-CM

## 2024-02-29 RX ORDER — AMOXICILLIN AND CLAVULANATE POTASSIUM 875; 125 MG/1; MG/1
1 TABLET, FILM COATED ORAL 2 TIMES DAILY
Qty: 20 TABLET | Refills: 0 | Status: SHIPPED | OUTPATIENT
Start: 2024-02-29 | End: 2024-03-10

## 2024-03-11 NOTE — PROGRESS NOTES
[facility]  [address]  [facility] [address]  Dept: [phone]  Dept Fax: [phone]    OSMAN Trevizo is a 23 y.o.female with PMHx insomnia and daytime somnolence, anxiety/depression, unspecified tachycardia syndrome who presents for follow up of palpitations and weight loss counseling, anxiety/depression    Didn't get zepbound approved. Still on wellbutrin/naltrexone but doesn't feel like it's doing much. Hasn't taken it in a few days. Would like to wean off.    Would like to see a nutritionist. Has a lot of food aversions so doesn't eat a lot of variety. Called weight management clinic but they were quick to talk about surgery. They offered nutritionist and  but it has a $200 entry fee so she can't afford it right now.    Dizziness is a lot better. Doesn't want to do tilt table to check for POTS due to cost. Palpitations much less frequent. Still taking toprol 75 mg daying. Doesn't notice them every day but on days that she doesn't eat much, she notices it more. Not drinking pop much anymore. Mostly just water.    Mood overall good. On seroquel 100 mg at bedtime for insomnia. Has tried doxepin and ambien without relief. Without it would only get 4 hours of sleep. Takes ritalin for daytime sleepiness. Had sleep study last year but couldn't sleep enough to check for narcolepsy.    Otherwise denies HA, fevers, chills, chest pain, shortness of breath, leg swelling, n/v, d/c.      Wt Readings from Last 3 Encounters:   03/14/24 102.1 kg (225 lb)   01/10/24 100.7 kg (222 lb)   11/20/23 99.3 kg (219 lb)   Weight increased 3# since last visit 2 months ago.     Patient Active Problem List   Diagnosis    Infective otitis externa    Cervical adenopathy    Nasal obstruction    Hypertrophy of nasal turbinates    Deviated nasal septum    Enlarged tonsils and adenoids    Tonsillitis, chronic    Status post tonsillectomy and adenoidectomy    Chronic

## 2024-03-14 ENCOUNTER — OFFICE VISIT (OUTPATIENT)
Dept: FAMILY MEDICINE CLINIC | Age: 24
End: 2024-03-14
Payer: COMMERCIAL

## 2024-03-14 VITALS
WEIGHT: 225 LBS | OXYGEN SATURATION: 98 % | BODY MASS INDEX: 38.41 KG/M2 | HEART RATE: 100 BPM | HEIGHT: 64 IN | RESPIRATION RATE: 14 BRPM | DIASTOLIC BLOOD PRESSURE: 74 MMHG | SYSTOLIC BLOOD PRESSURE: 112 MMHG | TEMPERATURE: 97.6 F

## 2024-03-14 DIAGNOSIS — R00.2 PALPITATIONS: ICD-10-CM

## 2024-03-14 DIAGNOSIS — E66.09 CLASS 2 OBESITY DUE TO EXCESS CALORIES WITHOUT SERIOUS COMORBIDITY WITH BODY MASS INDEX (BMI) OF 38.0 TO 38.9 IN ADULT: Primary | ICD-10-CM

## 2024-03-14 DIAGNOSIS — R00.0 TACHYCARDIA: ICD-10-CM

## 2024-03-14 DIAGNOSIS — E88.810 METABOLIC SYNDROME: ICD-10-CM

## 2024-03-14 DIAGNOSIS — F32.A ANXIETY AND DEPRESSION: ICD-10-CM

## 2024-03-14 DIAGNOSIS — E88.819 INSULIN RESISTANCE: ICD-10-CM

## 2024-03-14 DIAGNOSIS — F41.9 ANXIETY AND DEPRESSION: ICD-10-CM

## 2024-03-14 PROBLEM — F33.2 SEVERE EPISODE OF RECURRENT MAJOR DEPRESSIVE DISORDER, WITHOUT PSYCHOTIC FEATURES (HCC): Status: RESOLVED | Noted: 2022-07-19 | Resolved: 2024-03-14

## 2024-03-14 PROCEDURE — 99214 OFFICE O/P EST MOD 30 MIN: CPT

## 2024-03-14 ASSESSMENT — ENCOUNTER SYMPTOMS
RHINORRHEA: 0
NAUSEA: 0
WHEEZING: 0
ABDOMINAL DISTENTION: 0
PHOTOPHOBIA: 0
SORE THROAT: 0
SHORTNESS OF BREATH: 0
DIARRHEA: 0
CONSTIPATION: 0
BLOOD IN STOOL: 0
VOMITING: 0
ABDOMINAL PAIN: 0

## 2024-03-14 NOTE — PATIENT INSTRUCTIONS
[email or web address]    Go to [employer] to ask about the     Can stop wellbutrin and if you get a headache can take ibuprofen and if headache persists take a wellbutrin    Cut down to 25 daily nalterexone x 1 week and then 12.5 mg daily and then stop

## 2024-04-02 ENCOUNTER — TELEPHONE (OUTPATIENT)
Dept: FAMILY MEDICINE CLINIC | Age: 24
End: 2024-04-02

## 2024-04-04 NOTE — TELEPHONE ENCOUNTER
Note from appeal:   Medications for weight loss are an EXCLUDED BENEFIT by the health plan and are not covered for any reason. This Excluded Benefit is not eligible for appeal. Appeal request for this excluded benefit will not be reviewed by the health plan.

## 2024-04-16 ENCOUNTER — TELEPHONE (OUTPATIENT)
Dept: PULMONOLOGY | Age: 24
End: 2024-04-16

## 2024-04-16 DIAGNOSIS — G47.11 IDIOPATHIC HYPERSOMNIA: Primary | ICD-10-CM

## 2024-04-16 RX ORDER — METHYLPHENIDATE HYDROCHLORIDE 5 MG/1
15 TABLET ORAL 2 TIMES DAILY
Qty: 180 TABLET | Refills: 0 | Status: SHIPPED | OUTPATIENT
Start: 2024-04-16 | End: 2024-05-16

## 2024-04-16 NOTE — TELEPHONE ENCOUNTER
Received refill request for Ritalin. Medication was last ordered by Renita Fournier. Medication was last ordered on 2/26/24 with 0 refills.   Patient was last seen in the office 7/14/23. Patient has a scheduled follow up 7/11/24.   Medication needs to be sent to [facility] [address]

## 2024-05-08 ENCOUNTER — TELEPHONE (OUTPATIENT)
Dept: FAMILY MEDICINE CLINIC | Age: 24
End: 2024-05-08

## 2024-05-08 NOTE — TELEPHONE ENCOUNTER
----- Message from Valery Trevizo sent at 5/8/2024  9:37 AM EDT -----  Regarding: Labs  Contact: [phone]  Hello, I was wondering if there was any testing that I could have done to see if I am allergic to dairy and gluten.

## 2024-05-14 ENCOUNTER — TELEPHONE (OUTPATIENT)
Dept: FAMILY MEDICINE CLINIC | Age: 24
End: 2024-05-14

## 2024-05-14 ENCOUNTER — NURSE ONLY (OUTPATIENT)
Dept: LAB | Age: 24
End: 2024-05-14

## 2024-05-14 DIAGNOSIS — K59.1 FUNCTIONAL DIARRHEA: ICD-10-CM

## 2024-05-14 DIAGNOSIS — R14.0 BLOATING: ICD-10-CM

## 2024-05-14 NOTE — TELEPHONE ENCOUNTER
----- Message from Valery Trevizo sent at 5/10/2024  5:13 PM EDT -----  Regarding: Labs  Contact: [phone]  No blood or mucus. I had a colonoscopy last year that ruled out crohns but they did see evidence of IBS. I would be fine with the blood test and breath test.

## 2024-05-16 LAB
GLIADIN PEPTIDE IGA SER IA-ACNC: 0.92 FLU (ref 0–4.99)
TTG IGA SER IA-ACNC: < 1.02 FLU (ref 0–4.99)

## 2024-05-16 NOTE — RESULT ENCOUNTER NOTE
Please notify patient. Celiac panel is negative which means patient does not have antibodies to the protein gluten which is found in many grains including wheat. This does not rule out a gluten or wheat intolerance which is difficult to test. The best way to determine this is to do an elimination diet (cut out things with gluten) and if symptoms resolve, you likely have an insensitivity. Please let me know of any questions/concerns. Thanks!    Electronically signed by Valery Kohler DO on 5/16/2024 at 8:15 AM

## 2024-06-11 ENCOUNTER — TELEPHONE (OUTPATIENT)
Dept: PULMONOLOGY | Age: 24
End: 2024-06-11

## 2024-06-11 DIAGNOSIS — G47.11 IDIOPATHIC HYPERSOMNIA: Primary | ICD-10-CM

## 2024-06-11 RX ORDER — METHYLPHENIDATE HYDROCHLORIDE 10 MG/1
10 TABLET ORAL 2 TIMES DAILY
Qty: 60 TABLET | Refills: 0 | Status: SHIPPED | OUTPATIENT
Start: 2024-06-11 | End: 2024-07-11

## 2024-06-11 RX ORDER — METHYLPHENIDATE HYDROCHLORIDE 5 MG/1
5 TABLET ORAL 2 TIMES DAILY
Qty: 60 TABLET | Refills: 0 | Status: SHIPPED | OUTPATIENT
Start: 2024-06-11 | End: 2024-06-11 | Stop reason: CLARIF

## 2024-06-11 NOTE — TELEPHONE ENCOUNTER
Received refill request for methylphenidate (RITALIN) 5 MG tablet .   Medication was last ordered by Renita Fournier.   Medication was last ordered on 4/16/24 with 0 refills.     Patient was last seen in the office 7/14/23.   Does patient have a scheduled follow up?: yes - 7/11/24.    Medication needs to be sent to   [id number] - [place], [place] - [address] [facility] - P [phone] - F [phone]  [address] [place] [id number]  Phone: [phone]  Fax: [phone]   .     Thank you, please advise!    Patient's Allergies:  Allergies   Allergen Reactions    Seasonal Other (See Comments)

## 2024-06-11 NOTE — TELEPHONE ENCOUNTER
[facility] regarding her Ritalin script. The directions are different than the previous script. Thank you

## 2024-06-17 ENCOUNTER — PATIENT MESSAGE (OUTPATIENT)
Dept: PULMONOLOGY | Age: 24
End: 2024-06-17

## 2024-06-17 DIAGNOSIS — G47.11 IDIOPATHIC HYPERSOMNIA: Primary | ICD-10-CM

## 2024-06-17 RX ORDER — METHYLPHENIDATE HYDROCHLORIDE 5 MG/1
5 TABLET ORAL 3 TIMES DAILY
Qty: 90 TABLET | Refills: 0 | Status: SHIPPED | OUTPATIENT
Start: 2024-06-17 | End: 2024-06-20 | Stop reason: CLARIF

## 2024-06-17 NOTE — TELEPHONE ENCOUNTER
Sorry, I don't know how I keep getting the dose incorrect.  I will send in new Rx for the 5 mg TID.  If you picked up the 10 mg, Go ahead and cut your 10 mg Tablets in half and take 1/2 tablet three times daily until you get your next fill .

## 2024-06-17 NOTE — TELEPHONE ENCOUNTER
From: Valery Trevizo  To: Renita Fournier  Sent: 6/17/2024 7:42 AM EDT  Subject: Ritalin    Hey I have been taking three 5mg tabs twice daily. Could you send in a new script with these directions to [facility] [address]?

## 2024-06-20 RX ORDER — METHYLPHENIDATE HYDROCHLORIDE 5 MG/1
15 TABLET ORAL 2 TIMES DAILY
Qty: 180 TABLET | Refills: 0 | Status: SHIPPED | OUTPATIENT
Start: 2024-06-20 | End: 2024-07-20

## 2024-06-20 RX ORDER — METHYLPHENIDATE HYDROCHLORIDE 5 MG/1
15 TABLET ORAL 2 TIMES DAILY
Qty: 90 TABLET | Refills: 0 | OUTPATIENT
Start: 2024-06-20 | End: 2024-07-20

## 2024-06-20 NOTE — TELEPHONE ENCOUNTER
New script sent for 3 tablets of 5 mg (for total of 15 mg ) PO BID.  Sorry about all this back and forth

## 2024-06-20 NOTE — TELEPHONE ENCOUNTER
Pt called stating medication needs to be prescribed as 3 5mg tablets twice daily.  NOT 1 5mg tablet twice daily.  Please advise.  Thank you.

## 2024-06-26 DIAGNOSIS — K04.7 TOOTH INFECTION: Primary | ICD-10-CM

## 2024-06-26 RX ORDER — AMOXICILLIN 500 MG/1
500 CAPSULE ORAL 2 TIMES DAILY
Qty: 20 CAPSULE | Refills: 0 | Status: SHIPPED | OUTPATIENT
Start: 2024-06-26 | End: 2024-07-06

## 2024-06-26 NOTE — PROGRESS NOTES
Patient called this AM, difficulty sleeping due to oral pain, left side of face is swollen and tender from infected wisdom tooth, saw her dentist last month and is scheduled with oral surgeon July 8th  Asking if anything she can do.   Recommend Ibuprofren , cool compress to face and sending in amoxicilling   She was also instructed to call her dentist to notify them of symptoms and treatments and make sure no further recommendations.

## 2024-06-29 ENCOUNTER — TELEPHONE (OUTPATIENT)
Dept: FAMILY MEDICINE CLINIC | Age: 24
End: 2024-06-29

## 2024-06-29 NOTE — TELEPHONE ENCOUNTER
Please notify patient. Celiac panel is negative which means patient does not have antibodies to the protein gluten which is found in many grains including wheat. This does not rule out a gluten or wheat intolerance which is difficult to test. The best way to determine this is to do an elimination diet (cut out things with gluten) and if symptoms resolve, you likely have an insensitivity. Please let me know of any questions/concerns. Also please schedule follow up appointment. Thanks!    Electronically signed by Valery Kohler DO on 6/29/2024 at 1:12 PM

## 2024-07-31 NOTE — PROGRESS NOTES
Perception: Attention normal.         Mood and Affect: Mood normal.         Speech: Speech normal.         Behavior: Behavior normal.         Thought Content: Thought content normal.         Cognition and Memory: Cognition normal.         Judgment: Judgment normal.              Diagnostic Data:    Assessment / Plan   Valery was seen today for follow-up.    Diagnoses and all orders for this visit:    Idiopathic hypersomnia- controlled  Symptoms have been well-controlled on current therapies.  Refill sent for Ritalin 15 mg tablets twice daily.  Continue to work on good sleep hygiene practices    -     methylphenidate (RITALIN) 5 MG tablet; Take 3 tablets by mouth 2 times daily for 30 days. Max Daily Amount: 30 mg    Generalized anxiety disorder with panic attacks-suboptimally controlled  Noticing some increased difficulty getting to sleep at night on current dose.  Plan is to increase Seroquel dose to 150 mg tablets nightly before bed  -     QUEtiapine Fumarate 150 MG TABS; Take 150 mg by mouth at bedtime    Other insomnia-sub optimally uncontrolled  Noticing some increased difficulty getting to sleep at night on current dose.  Plan is to increase Seroquel dose to 150 mg tablets nightly before bed    -     QUEtiapine Fumarate 150 MG TABS; Take 150 mg by mouth at bedtime      Educated on good sleepy hygiene practices   Recommend 7-9 hours of sleep nightly   Work on weight loss     Follow up in Sleep Clinic 6 months  Valery PIPER Joseline educated about her condition and my plan.     She verbalizes understanding.    billing based on medical decision making

## 2024-08-01 ENCOUNTER — OFFICE VISIT (OUTPATIENT)
Dept: PULMONOLOGY | Age: 24
End: 2024-08-01
Payer: COMMERCIAL

## 2024-08-01 VITALS
HEART RATE: 77 BPM | SYSTOLIC BLOOD PRESSURE: 102 MMHG | OXYGEN SATURATION: 98 % | WEIGHT: 228 LBS | DIASTOLIC BLOOD PRESSURE: 76 MMHG | HEIGHT: 64 IN | BODY MASS INDEX: 38.93 KG/M2 | TEMPERATURE: 98.2 F

## 2024-08-01 DIAGNOSIS — F41.0 GENERALIZED ANXIETY DISORDER WITH PANIC ATTACKS: ICD-10-CM

## 2024-08-01 DIAGNOSIS — G47.11 IDIOPATHIC HYPERSOMNIA: Primary | ICD-10-CM

## 2024-08-01 DIAGNOSIS — G47.09 OTHER INSOMNIA: ICD-10-CM

## 2024-08-01 DIAGNOSIS — F41.1 GENERALIZED ANXIETY DISORDER WITH PANIC ATTACKS: ICD-10-CM

## 2024-08-01 PROBLEM — N80.9 ENDOMETRIOSIS: Status: ACTIVE | Noted: 2024-08-01

## 2024-08-01 PROBLEM — E28.2 PCOS (POLYCYSTIC OVARIAN SYNDROME): Status: ACTIVE | Noted: 2024-08-01

## 2024-08-01 PROCEDURE — 99214 OFFICE O/P EST MOD 30 MIN: CPT | Performed by: NURSE PRACTITIONER

## 2024-08-01 RX ORDER — QUETIAPINE FUMARATE 150 MG/1
150 TABLET, FILM COATED ORAL NIGHTLY
Qty: 30 TABLET | Refills: 5 | Status: SHIPPED | OUTPATIENT
Start: 2024-08-01

## 2024-08-01 RX ORDER — METHYLPHENIDATE HYDROCHLORIDE 5 MG/1
15 TABLET ORAL 2 TIMES DAILY
Qty: 180 TABLET | Refills: 0 | Status: SHIPPED | OUTPATIENT
Start: 2024-08-01 | End: 2024-08-31

## 2024-08-01 RX ORDER — METHYLPHENIDATE HYDROCHLORIDE 5 MG/1
3 TABLET ORAL 2 TIMES DAILY
COMMUNITY
End: 2024-08-01 | Stop reason: SDUPTHER

## 2024-08-01 RX ORDER — QUETIAPINE FUMARATE 150 MG/1
150 TABLET, FILM COATED ORAL NIGHTLY
Qty: 30 TABLET | Refills: 5 | Status: SHIPPED | OUTPATIENT
Start: 2024-08-01 | End: 2024-08-01 | Stop reason: SDUPTHER

## 2024-08-16 DIAGNOSIS — F41.1 GENERALIZED ANXIETY DISORDER WITH PANIC ATTACKS: ICD-10-CM

## 2024-08-16 DIAGNOSIS — F41.0 GENERALIZED ANXIETY DISORDER WITH PANIC ATTACKS: ICD-10-CM

## 2024-08-16 DIAGNOSIS — G47.09 OTHER INSOMNIA: ICD-10-CM

## 2024-08-16 RX ORDER — QUETIAPINE FUMARATE 150 MG/1
150 TABLET, FILM COATED ORAL NIGHTLY
Qty: 30 TABLET | Refills: 5 | Status: SHIPPED | OUTPATIENT
Start: 2024-08-16

## 2024-08-19 ENCOUNTER — TELEPHONE (OUTPATIENT)
Dept: PULMONOLOGY | Age: 24
End: 2024-08-19

## 2024-08-19 RX ORDER — QUETIAPINE FUMARATE 100 MG/1
150 TABLET, FILM COATED ORAL NIGHTLY
Qty: 45 TABLET | Refills: 5 | Status: SHIPPED | OUTPATIENT
Start: 2024-08-19

## 2024-08-19 NOTE — TELEPHONE ENCOUNTER
She is okay with splitting the tablet in half. She said she would like this script sent to [facility] pharmacy.

## 2024-08-19 NOTE — TELEPHONE ENCOUNTER
Patients insurance will not cover 150mg of Seroquel, They will only cover 100mg and would like patient to cut the 100mg tab to make the 50mg. Please see denial.       This request has not been approved. Your request was reviewed by the health plan and based on the information submitted was not approved. Quetiapine 150mg strength tablets are not covered by the health plan. Please consider prescribing Quetiapine 100mg strength tablets and instructing the patient to take one and one-half tablets at bedtime. Otherwise, additional copay savings may be obtained via a generic copay savings card such as [employer]. A written notification letter will follow with additional details.

## 2024-10-04 DIAGNOSIS — G47.11 IDIOPATHIC HYPERSOMNIA: Primary | ICD-10-CM

## 2024-10-04 RX ORDER — METHYLPHENIDATE HYDROCHLORIDE 5 MG/1
5 TABLET ORAL 3 TIMES DAILY
COMMUNITY
End: 2024-10-04 | Stop reason: SDUPTHER

## 2024-10-04 NOTE — TELEPHONE ENCOUNTER
Received refill request for methylphenidate (RITALIN) 5 MG tablet .   Medication was last ordered by Renita Fournier.   Medication was last ordered on 8.1.24 with 0 refills.     Patient was last seen in the office 8.1.24.   Does patient have a scheduled follow up?: yes - 1.30.25    Medication needs to be sent to   [facility] [address] [place] [address], [place] - [address] - P [phone] - F [phone]  [facility] [address] [place] [address]  Phone: [phone]  Fax: [phone]     Thank you, please advise!    Patient's Allergies:  Allergies   Allergen Reactions    Milk-Related Compounds Other (See Comments)     Abd pain, cramping    Seasonal Other (See Comments)

## 2024-10-07 RX ORDER — METHYLPHENIDATE HYDROCHLORIDE 5 MG/1
15 TABLET ORAL 2 TIMES DAILY
Qty: 90 TABLET | Refills: 0 | Status: SHIPPED | OUTPATIENT
Start: 2024-10-07 | End: 2025-04-05

## 2024-11-04 DIAGNOSIS — G47.11 IDIOPATHIC HYPERSOMNIA: ICD-10-CM

## 2024-11-04 RX ORDER — METHYLPHENIDATE HYDROCHLORIDE 5 MG/1
15 TABLET ORAL 2 TIMES DAILY
Qty: 180 TABLET | Refills: 0 | Status: SHIPPED | OUTPATIENT
Start: 2024-11-04 | End: 2024-12-04

## 2024-11-04 NOTE — TELEPHONE ENCOUNTER
Received refill request for methylphenidate (RITALIN) 5 MG tablet.   Medication was last ordered by Renita Fournier.   Medication was last ordered on 10/7/24 with 0 refills.     Patient was last seen in the office 8/1/24.   Does patient have a scheduled follow up?: yes - 1/30/25.    Medication needs to be sent to   [facility] [address] [place] [address], [place] - [facility] [address] - P [phone] - F [phone]  [facility]ly [facility] [address] [place] [address]  Phone: [phone]  Fax: [phone]     Thank you, please advise!    Patient's Allergies:  Allergies   Allergen Reactions    Milk-Related Compounds Other (See Comments)     Abd pain, cramping    Seasonal Other (See Comments)

## 2024-12-18 ENCOUNTER — PATIENT MESSAGE (OUTPATIENT)
Dept: PULMONOLOGY | Age: 24
End: 2024-12-18

## 2024-12-18 DIAGNOSIS — G47.11 IDIOPATHIC HYPERSOMNIA: Primary | ICD-10-CM

## 2024-12-18 RX ORDER — METHYLPHENIDATE HYDROCHLORIDE 5 MG/1
15 TABLET ORAL 2 TIMES DAILY
Qty: 180 TABLET | Refills: 0 | Status: SHIPPED | OUTPATIENT
Start: 2024-12-18 | End: 2025-01-17

## 2024-12-18 RX ORDER — METHYLPHENIDATE HYDROCHLORIDE 5 MG/1
15 TABLET ORAL 2 TIMES DAILY
COMMUNITY
End: 2024-12-18 | Stop reason: SDUPTHER

## 2025-01-15 DIAGNOSIS — G47.11 IDIOPATHIC HYPERSOMNIA: ICD-10-CM

## 2025-01-16 RX ORDER — METHYLPHENIDATE HYDROCHLORIDE 5 MG/1
15 TABLET ORAL 2 TIMES DAILY
Qty: 180 TABLET | Refills: 0 | Status: SHIPPED | OUTPATIENT
Start: 2025-01-16 | End: 2025-02-15

## 2025-01-16 NOTE — TELEPHONE ENCOUNTER
Received refill request for methylphenidate (RITALIN) 5 MG tablet.   Medication was last ordered by Renita Fournier.   Medication was last ordered on 12/18/24 with 0 refills.     Patient was last seen in the office 8/1/24.   Does patient have a scheduled follow up?: yes - 2/20/25.    Medication needs to be sent to   [facility] [address] [place] [address], [place] - [facility] [address] - P [phone] - F [phone]  [facility] [address] [place] [address]  Phone: [phone]  Fax: [phone]        Thank you, please advise!    Patient's Allergies:  Allergies   Allergen Reactions    Milk-Related Compounds Other (See Comments)     Abd pain, cramping    Seasonal Other (See Comments)

## 2025-01-21 ENCOUNTER — LAB (OUTPATIENT)
Dept: LAB | Age: 25
End: 2025-01-21

## 2025-01-21 LAB — PROGEST SERPL-MCNC: 0.08 NG/ML

## 2025-01-31 LAB — CYTOLOGY THIN PREP PAP: NORMAL

## 2025-02-04 LAB — CYTOLOGY THIN PREP PAP: NORMAL

## 2025-02-16 DIAGNOSIS — F41.0 GENERALIZED ANXIETY DISORDER WITH PANIC ATTACKS: ICD-10-CM

## 2025-02-16 DIAGNOSIS — F41.1 GENERALIZED ANXIETY DISORDER WITH PANIC ATTACKS: ICD-10-CM

## 2025-02-16 DIAGNOSIS — G47.11 IDIOPATHIC HYPERSOMNIA: Primary | ICD-10-CM

## 2025-02-17 RX ORDER — QUETIAPINE FUMARATE 100 MG/1
TABLET, FILM COATED ORAL
Qty: 45 TABLET | Refills: 5 | Status: SHIPPED | OUTPATIENT
Start: 2025-02-17

## 2025-02-17 RX ORDER — METHYLPHENIDATE HYDROCHLORIDE 5 MG/1
15 TABLET ORAL 2 TIMES DAILY
Qty: 180 TABLET | Refills: 0 | Status: SHIPPED | OUTPATIENT
Start: 2025-02-17 | End: 2025-03-19

## 2025-02-17 NOTE — TELEPHONE ENCOUNTER
Patient is also in need a refill for her Ritalin, I added that into this refill request. She wants these to go to [facility] on [address].     Thank you.

## 2025-02-18 ENCOUNTER — OFFICE VISIT (OUTPATIENT)
Dept: PULMONOLOGY | Age: 25
End: 2025-02-18
Payer: COMMERCIAL

## 2025-02-18 VITALS
TEMPERATURE: 98.8 F | BODY MASS INDEX: 40.8 KG/M2 | SYSTOLIC BLOOD PRESSURE: 122 MMHG | WEIGHT: 239 LBS | HEIGHT: 64 IN | OXYGEN SATURATION: 98 % | DIASTOLIC BLOOD PRESSURE: 72 MMHG | HEART RATE: 103 BPM

## 2025-02-18 DIAGNOSIS — G47.11 IDIOPATHIC HYPERSOMNIA: Primary | ICD-10-CM

## 2025-02-18 DIAGNOSIS — E66.812 CLASS 2 OBESITY DUE TO EXCESS CALORIES WITHOUT SERIOUS COMORBIDITY WITH BODY MASS INDEX (BMI) OF 38.0 TO 38.9 IN ADULT: ICD-10-CM

## 2025-02-18 DIAGNOSIS — F41.1 GENERALIZED ANXIETY DISORDER WITH PANIC ATTACKS: ICD-10-CM

## 2025-02-18 DIAGNOSIS — F41.0 GENERALIZED ANXIETY DISORDER WITH PANIC ATTACKS: ICD-10-CM

## 2025-02-18 DIAGNOSIS — E66.09 CLASS 2 OBESITY DUE TO EXCESS CALORIES WITHOUT SERIOUS COMORBIDITY WITH BODY MASS INDEX (BMI) OF 38.0 TO 38.9 IN ADULT: ICD-10-CM

## 2025-02-18 PROCEDURE — 99213 OFFICE O/P EST LOW 20 MIN: CPT | Performed by: NURSE PRACTITIONER

## 2025-02-18 NOTE — PROGRESS NOTES
[facility]      Sleep Medicine clinic Follow up    Valery Trevizo                                           Pt of Dr Robert    Chief complaint and [place]: Valery came for follow up regarding   Chief Complaint   Patient presents with    Follow-up     6 mo insomnia med check f/u     ESS: 3  SAQLI:  67     She is taking her medications with good compliace.  Current Medications:  Ritalin 15 mg PO BID, Seroquel 100 mg PO nightly   May not take morning Ritalin on weekends if she sleeps in   Noticing much benefit with current therapies     She is  practicing good sleep hygiene practices.    Shedenies any new problems.   No recent hospitalizations or ER visits.   She denies any work related problems.   No hypersomnolence. No driving issues.       Review of Systems   Review of Systems   Psychiatric/Behavioral:  Positive for sleep disturbance. The patient is nervous/anxious.    All other systems reviewed and are negative.      Vitals: /72 (Site: Left Upper Arm, Position: Sitting, Cuff Size: Large Adult)   Pulse (!) 103   Temp 98.8 °F (37.1 °C) (Infrared)   Ht 1.626 m (5' 4\")   Wt 108.4 kg (239 lb)   SpO2 98%   BMI 41.02 kg/m²   Body mass index is 41.02 kg/m².   Physical Exam  Vitals and nursing note reviewed.   Constitutional:       Appearance: Normal appearance. She is obese.   HENT:      Head: Normocephalic and atraumatic.      Mouth/Throat:      Pharynx: Oropharynx is clear.   Eyes:      Conjunctiva/sclera: Conjunctivae normal.   Pulmonary:      Effort: Pulmonary effort is normal. No tachypnea, bradypnea or respiratory distress.   Skin:     Findings: No erythema or rash.   Neurological:      Mental Status: She is alert and oriented to person, place, and time.   Psychiatric:         Attention and Perception: Attention normal.         Mood and Affect: Mood normal.         Speech: Speech normal.         Behavior: Behavior normal.         Thought Content: Thought

## 2025-03-04 ENCOUNTER — LAB (OUTPATIENT)
Dept: LAB | Age: 25
End: 2025-03-04

## 2025-03-04 LAB — PROGEST SERPL-MCNC: 0.07 NG/ML

## 2025-04-29 ENCOUNTER — LAB (OUTPATIENT)
Dept: LAB | Age: 25
End: 2025-04-29

## 2025-04-29 LAB — HCG INTACT+B SERPL-ACNC: < 1 MIU/ML (ref 0–5)

## 2025-05-06 DIAGNOSIS — F41.0 GENERALIZED ANXIETY DISORDER WITH PANIC ATTACKS: ICD-10-CM

## 2025-05-06 DIAGNOSIS — F41.1 GENERALIZED ANXIETY DISORDER WITH PANIC ATTACKS: ICD-10-CM

## 2025-05-06 RX ORDER — QUETIAPINE FUMARATE 100 MG/1
150 TABLET, FILM COATED ORAL NIGHTLY
Qty: 45 TABLET | Refills: 5 | Status: SHIPPED | OUTPATIENT
Start: 2025-05-06

## 2025-05-27 DIAGNOSIS — G47.11 IDIOPATHIC HYPERSOMNIA: Primary | ICD-10-CM

## 2025-05-27 LAB
CHOLEST SERPL-MCNC: 172 MG/DL (ref 0–199)
FASTING: YES
GLUCOSE SERPL-MCNC: 98 MG/DL (ref 74–109)
HDLC SERPL-MCNC: 38 MG/DL (ref 40–90)
LDLC SERPL CALC-MCNC: 105 MG/DL
TRIGL SERPL-MCNC: 146 MG/DL (ref 0–199)

## 2025-05-27 RX ORDER — METHYLPHENIDATE HYDROCHLORIDE 5 MG/1
5 TABLET ORAL 2 TIMES DAILY
COMMUNITY
End: 2025-05-27 | Stop reason: SDUPTHER

## 2025-05-28 RX ORDER — METHYLPHENIDATE HYDROCHLORIDE 5 MG/1
15 TABLET ORAL 2 TIMES DAILY
Qty: 180 TABLET | Refills: 0 | Status: SHIPPED | OUTPATIENT
Start: 2025-05-28 | End: 2025-06-27

## 2025-07-24 ENCOUNTER — LAB (OUTPATIENT)
Dept: LAB | Age: 25
End: 2025-07-24

## 2025-07-24 LAB — HCG INTACT+B SERPL-ACNC: 10 MIU/ML (ref 0–5)

## 2025-07-26 ENCOUNTER — LAB (OUTPATIENT)
Dept: LAB | Age: 25
End: 2025-07-26

## 2025-07-26 LAB
HCG INTACT+B SERPL-ACNC: 29 MIU/ML (ref 0–5)
PROGEST SERPL-MCNC: 11.4 NG/ML

## 2025-07-28 ENCOUNTER — LAB (OUTPATIENT)
Dept: LAB | Age: 25
End: 2025-07-28

## 2025-07-28 LAB — HCG INTACT+B SERPL-ACNC: 59 MIU/ML (ref 0–5)

## 2025-07-30 ENCOUNTER — LAB (OUTPATIENT)
Dept: LAB | Age: 25
End: 2025-07-30

## 2025-07-30 LAB — HCG INTACT+B SERPL-ACNC: 115 MIU/ML (ref 0–5)

## (undated) DEVICE — GARMENT COMPR STD FOR 17IN CALF UNIF THER FLOTRN

## (undated) DEVICE — GAUZE,SPONGE,8"X4",12PLY,XRAY,STRL,LF: Brand: MEDLINE

## (undated) DEVICE — SURE SET SINGLE BASIN-LF: Brand: MEDLINE INDUSTRIES, INC.

## (undated) DEVICE — SUREFIT, DUAL DISPERSIVE ELECTRODE, CONTACT QUALITY MONITOR: Brand: SUREFIT

## (undated) DEVICE — INTENDED FOR TISSUE SEPARATION, AND OTHER PROCEDURES THAT REQUIRE A SHARP SURGICAL BLADE TO PUNCTURE OR CUT.: Brand: BARD-PARKER ® CARBON RIB-BACK BLADES

## (undated) DEVICE — SYRINGE MED 10ML TRNSLUC BRL PLUNG BLK MRK POLYPR CTRL

## (undated) DEVICE — SEALER ENDOSCP L37CM NANO COAT BLNT TIP LAP DIV

## (undated) DEVICE — DUAL LUMEN STOMACH TUBE: Brand: SALEM SUMP

## (undated) DEVICE — CHLORAPREP 26ML ORANGE

## (undated) DEVICE — BARRIER ADH W3XL4IN UTER PELV ABSRB GYNECARE INTCEED

## (undated) DEVICE — NEEDLE SPNL L3.5IN DIA25GA QNCKE TYP BVL SPINOCAN

## (undated) DEVICE — SOLUTION SURG PREP POV IOD 7.5% 4 OZ

## (undated) DEVICE — Z INACTIVE NO SUPPLIER IDENTIFIED TUBING INSUFFLATION FILTER

## (undated) DEVICE — SYRINGE MED 30ML STD CLR PLAS LUERLOCK TIP N CTRL DISP

## (undated) DEVICE — SUTURE VCRL SZ 4-0 L27IN ABSRB UD L19MM FS-2 3/8 CIR REV J422H

## (undated) DEVICE — TROCAR: Brand: KII FIOS FIRST ENTRY

## (undated) DEVICE — PREP SOL PVP IODINE 4%  4 OZ/BTL

## (undated) DEVICE — MEDI-VAC NON-CONDUCTIVE SUCTION TUBING: Brand: CARDINAL HEALTH

## (undated) DEVICE — SOLUTION IV 1000ML 0.9% SOD CHL PH 5 INJ USP VIAFLX PLAS

## (undated) DEVICE — 1010 S-DRAPE TOWEL DRAPE 10/BX: Brand: STERI-DRAPE™

## (undated) DEVICE — HYPODERMIC SAFETY NEEDLE: Brand: MAGELLAN

## (undated) DEVICE — PUMP SUC IRR TBNG L10FT W/ HNDPC ASSEMB STRYKEFLOW 2

## (undated) DEVICE — GLOVE SURG SZ 65 THK91MIL LTX FREE SYN POLYISOPRENE

## (undated) DEVICE — PACK,SET UP,NO DRAPES: Brand: MEDLINE

## (undated) DEVICE — Z INACTIVE USE 2735373 APPLICATOR FBR LAIN COT WOOD TIP ECONOMICAL

## (undated) DEVICE — NEEDLE HYPO 27GA L1.25IN GRY POLYPR HUB S STL REG BVL STR

## (undated) DEVICE — Y-TYPE TUR/BLADDER IRRIGATION SET, REGULATING CLAMP

## (undated) DEVICE — GOWN,SIRUS,NON REINFRCD,LARGE,SET IN SL: Brand: MEDLINE

## (undated) DEVICE — YANKAUER,BULB TIP,W/O VENT,RIGID,STERILE: Brand: MEDLINE

## (undated) DEVICE — ANTI-FOG SOLUTION WITH FOAM PAD: Brand: DEVON

## (undated) DEVICE — SYRINGE MED 10ML LUERLOCK TIP W/O SFTY DISP

## (undated) DEVICE — GLOVE ORANGE PI 7 1/2   MSG9075

## (undated) DEVICE — BREAST HERNIA PACK: Brand: MEDLINE INDUSTRIES, INC.

## (undated) DEVICE — TUBING, SUCTION, 1/4" X 20', STRAIGHT: Brand: MEDLINE INDUSTRIES, INC.

## (undated) DEVICE — SOLUTION SCRB 4OZ 10% PVP I POVIDONE IOD TOP PAINT EXIDINE

## (undated) DEVICE — SUTURE VCRL SZ 2-0 L27IN ABSRB VLT L26MM UR-6 5/8 CIR J602H

## (undated) DEVICE — SOLUTION SURG PREP SCRUB POV IOD 7.5% 4 OZ

## (undated) DEVICE — SPONGE GZ W4XL4IN COT 12 PLY TYP VII WVN C FLD DSGN

## (undated) DEVICE — TUBING SUCT L12FT DIA0.25IN CLR W/ MAXI-GRIP AND M/M CONN

## (undated) DEVICE — KIT,ANTI FOG,W/SPONGE & FLUID,SOFT PACK: Brand: MEDLINE

## (undated) DEVICE — Z DISCONTINUED BY MEDLINE USE 2711682 TRAY SKIN PREP DRY W/ PREM GLV

## (undated) DEVICE — 1840 FOAM BLOCK NEEDLE COUNTER: Brand: DEVON

## (undated) DEVICE — CODMAN® SURGICAL PATTIES 1/2" X 3" (1.27CM X 7.62CM): Brand: CODMAN®

## (undated) DEVICE — FLEXIBLE ADHESIVE BANDAGE: Brand: CURITY

## (undated) DEVICE — TELFA NON-ADHERENT ABSORBENT DRESSING: Brand: TELFA

## (undated) DEVICE — GOWN,SIRUS,NONRNF,SETINSLV,XL,20/CS: Brand: MEDLINE

## (undated) DEVICE — GLOVE ORANGE PI 7   MSG9070

## (undated) DEVICE — PACK,LAPAROSCOPY,PK II,SIRUS: Brand: MEDLINE

## (undated) DEVICE — ELECTRODE PT RET AD L9FT HI MOIST COND ADH HYDRGEL CORDED

## (undated) DEVICE — STRIP,CLOSURE,WOUND,MEDI-STRIP,1/2X4: Brand: MEDLINE

## (undated) DEVICE — PAD,ABDOMINAL,5"X9",ST,LF,25/BX: Brand: MEDLINE INDUSTRIES, INC.

## (undated) DEVICE — DRAPE,UNDERBUTTOCKS,PCH,STERILE: Brand: MEDLINE

## (undated) DEVICE — TROCAR: Brand: KII SHIELDED BLADED ACCESS SYSTEM

## (undated) DEVICE — DRAPE,EENT,SPLIT,STERILE: Brand: MEDLINE

## (undated) DEVICE — RED RUBBER ROBINSON URETHRAL CATHETER, RADIOPAQUE, SMOOTH ROUNDED TIP, 14 FR (4.7 MM): Brand: DOVER

## (undated) DEVICE — GLOVE ORTHO 7 1/2   MSG9475

## (undated) DEVICE — 1 ML TUBERCULIN SYRINGE LUER-LOCK TIP: Brand: MONOJECT

## (undated) DEVICE — TURBINATOR WAND: Brand: COBLATION

## (undated) DEVICE — MICRO TIP WIPE: Brand: DEVON

## (undated) DEVICE — PAD,SANITARY,11 IN,MAXI,W/WINGS,N-STRL: Brand: MEDLINE

## (undated) DEVICE — MEDI-VAC YANKAUER SUCTION HANDLE W/BULBOUS TIP: Brand: CARDINAL HEALTH